# Patient Record
Sex: MALE | Race: BLACK OR AFRICAN AMERICAN | Employment: UNEMPLOYED | URBAN - METROPOLITAN AREA
[De-identification: names, ages, dates, MRNs, and addresses within clinical notes are randomized per-mention and may not be internally consistent; named-entity substitution may affect disease eponyms.]

---

## 2021-01-01 ENCOUNTER — APPOINTMENT (OUTPATIENT)
Dept: GENERAL RADIOLOGY | Age: 66
DRG: 870 | End: 2021-01-01
Attending: FAMILY MEDICINE
Payer: MEDICARE

## 2021-01-01 ENCOUNTER — APPOINTMENT (OUTPATIENT)
Dept: GENERAL RADIOLOGY | Age: 66
DRG: 870 | End: 2021-01-01
Attending: INTERNAL MEDICINE
Payer: MEDICARE

## 2021-01-01 ENCOUNTER — APPOINTMENT (OUTPATIENT)
Dept: NON INVASIVE DIAGNOSTICS | Age: 66
DRG: 870 | End: 2021-01-01
Attending: FAMILY MEDICINE
Payer: MEDICARE

## 2021-01-01 ENCOUNTER — APPOINTMENT (OUTPATIENT)
Dept: CT IMAGING | Age: 66
DRG: 870 | End: 2021-01-01
Attending: STUDENT IN AN ORGANIZED HEALTH CARE EDUCATION/TRAINING PROGRAM
Payer: MEDICARE

## 2021-01-01 ENCOUNTER — HOSPICE ADMISSION (OUTPATIENT)
Dept: HOSPICE | Facility: HOSPICE | Age: 66
End: 2021-01-01
Payer: MEDICARE

## 2021-01-01 ENCOUNTER — HOSPITAL ENCOUNTER (INPATIENT)
Age: 66
LOS: 4 days | End: 2021-08-17
Attending: FAMILY MEDICINE | Admitting: FAMILY MEDICINE
Payer: MEDICARE

## 2021-01-01 ENCOUNTER — HOSPITAL ENCOUNTER (INPATIENT)
Age: 66
LOS: 9 days | Discharge: HOSPICE | DRG: 870 | End: 2021-08-13
Attending: STUDENT IN AN ORGANIZED HEALTH CARE EDUCATION/TRAINING PROGRAM | Admitting: FAMILY MEDICINE
Payer: MEDICARE

## 2021-01-01 ENCOUNTER — APPOINTMENT (OUTPATIENT)
Dept: MRI IMAGING | Age: 66
DRG: 870 | End: 2021-01-01
Attending: FAMILY MEDICINE
Payer: MEDICARE

## 2021-01-01 VITALS
HEIGHT: 65 IN | BODY MASS INDEX: 29.2 KG/M2 | DIASTOLIC BLOOD PRESSURE: 69 MMHG | HEART RATE: 120 BPM | RESPIRATION RATE: 24 BRPM | SYSTOLIC BLOOD PRESSURE: 103 MMHG | TEMPERATURE: 99.9 F | WEIGHT: 175.27 LBS | OXYGEN SATURATION: 90 %

## 2021-01-01 VITALS
TEMPERATURE: 99.1 F | DIASTOLIC BLOOD PRESSURE: 36 MMHG | SYSTOLIC BLOOD PRESSURE: 60 MMHG | OXYGEN SATURATION: 70 % | HEART RATE: 85 BPM | RESPIRATION RATE: 4 BRPM

## 2021-01-01 DIAGNOSIS — R06.02 SHORTNESS OF BREATH: ICD-10-CM

## 2021-01-01 DIAGNOSIS — R41.82 ALTERED MENTAL STATUS, UNSPECIFIED ALTERED MENTAL STATUS TYPE: Primary | ICD-10-CM

## 2021-01-01 DIAGNOSIS — J39.8 INCREASED TRACHEAL SECRETIONS: ICD-10-CM

## 2021-01-01 DIAGNOSIS — R56.9 SEIZURES (HCC): ICD-10-CM

## 2021-01-01 DIAGNOSIS — Z86.73 HISTORY OF CVA (CEREBROVASCULAR ACCIDENT): ICD-10-CM

## 2021-01-01 DIAGNOSIS — Z51.5 HOSPICE CARE: ICD-10-CM

## 2021-01-01 DIAGNOSIS — R47.1 DYSARTHRIA: ICD-10-CM

## 2021-01-01 LAB
ALBUMIN SERPL-MCNC: 1.9 G/DL (ref 3.5–5)
ALBUMIN SERPL-MCNC: 2 G/DL (ref 3.5–5)
ALBUMIN SERPL-MCNC: 2 G/DL (ref 3.5–5)
ALBUMIN SERPL-MCNC: 2.2 G/DL (ref 3.5–5)
ALBUMIN SERPL-MCNC: 2.6 G/DL (ref 3.5–5)
ALBUMIN SERPL-MCNC: 2.6 G/DL (ref 3.5–5)
ALBUMIN SERPL-MCNC: 2.7 G/DL (ref 3.5–5)
ALBUMIN SERPL-MCNC: 2.7 G/DL (ref 3.5–5)
ALBUMIN SERPL-MCNC: 3.4 G/DL (ref 3.5–5)
ALBUMIN/GLOB SERPL: 0.5 {RATIO} (ref 1.1–2.2)
ALBUMIN/GLOB SERPL: 0.6 {RATIO} (ref 1.1–2.2)
ALBUMIN/GLOB SERPL: 0.7 {RATIO} (ref 1.1–2.2)
ALBUMIN/GLOB SERPL: 0.7 {RATIO} (ref 1.1–2.2)
ALBUMIN/GLOB SERPL: 0.8 {RATIO} (ref 1.1–2.2)
ALP SERPL-CCNC: 102 U/L (ref 45–117)
ALP SERPL-CCNC: 105 U/L (ref 45–117)
ALP SERPL-CCNC: 41 U/L (ref 45–117)
ALP SERPL-CCNC: 42 U/L (ref 45–117)
ALP SERPL-CCNC: 48 U/L (ref 45–117)
ALP SERPL-CCNC: 70 U/L (ref 45–117)
ALP SERPL-CCNC: 94 U/L (ref 45–117)
ALT SERPL-CCNC: 33 U/L (ref 12–78)
ALT SERPL-CCNC: 338 U/L (ref 12–78)
ALT SERPL-CCNC: 377 U/L (ref 12–78)
ALT SERPL-CCNC: 433 U/L (ref 12–78)
ALT SERPL-CCNC: 61 U/L (ref 12–78)
ALT SERPL-CCNC: 79 U/L (ref 12–78)
ALT SERPL-CCNC: <6 U/L (ref 12–78)
AMMONIA PLAS-SCNC: 37 UMOL/L
AMPHET UR QL SCN: NEGATIVE
AMPHET UR QL SCN: NEGATIVE
ANION GAP SERPL CALC-SCNC: 10 MMOL/L (ref 5–15)
ANION GAP SERPL CALC-SCNC: 3 MMOL/L (ref 5–15)
ANION GAP SERPL CALC-SCNC: 4 MMOL/L (ref 5–15)
ANION GAP SERPL CALC-SCNC: 5 MMOL/L (ref 5–15)
ANION GAP SERPL CALC-SCNC: 6 MMOL/L (ref 5–15)
ANION GAP SERPL CALC-SCNC: 7 MMOL/L (ref 5–15)
APPEARANCE UR: CLEAR
APPEARANCE UR: CLEAR
APTT PPP: 24.7 SEC (ref 21.2–34.1)
ARTERIAL PATENCY WRIST A: ABNORMAL
AST SERPL W P-5'-P-CCNC: 107 U/L (ref 15–37)
AST SERPL W P-5'-P-CCNC: 131 U/L (ref 15–37)
AST SERPL W P-5'-P-CCNC: 147 U/L (ref 15–37)
AST SERPL W P-5'-P-CCNC: 236 U/L (ref 15–37)
AST SERPL W P-5'-P-CCNC: 33 U/L (ref 15–37)
AST SERPL W P-5'-P-CCNC: 350 U/L (ref 15–37)
AST SERPL W P-5'-P-CCNC: 84 U/L (ref 15–37)
ATRIAL RATE: 135 BPM
ATRIAL RATE: 81 BPM
BACTERIA SPEC CULT: NORMAL
BACTERIA URNS QL MICRO: NEGATIVE /HPF
BACTERIA URNS QL MICRO: NEGATIVE /HPF
BARBITURATES UR QL SCN: NEGATIVE
BARBITURATES UR QL SCN: NEGATIVE
BASE DEFICIT BLDA-SCNC: 0.2 MMOL/L (ref 0–2)
BASE EXCESS BLDA CALC-SCNC: 0.5 MMOL/L (ref 0–2)
BASE EXCESS BLDA CALC-SCNC: 1.7 MMOL/L (ref 0–2)
BASE EXCESS BLDA CALC-SCNC: 1.8 MMOL/L (ref 0–2)
BASE EXCESS BLDA CALC-SCNC: 1.9 MMOL/L (ref 0–2)
BASE EXCESS BLDA CALC-SCNC: 3.6 MMOL/L (ref 0–2)
BASE EXCESS BLDA CALC-SCNC: 4.2 MMOL/L (ref 0–2)
BASOPHILS # BLD: 0 K/UL (ref 0–0.1)
BASOPHILS # BLD: 0.1 K/UL (ref 0–0.1)
BASOPHILS # BLD: 0.1 K/UL (ref 0–0.1)
BASOPHILS NFR BLD: 0 % (ref 0–1)
BDY SITE: ABNORMAL
BENZODIAZ UR QL: NEGATIVE
BENZODIAZ UR QL: NEGATIVE
BILIRUB DIRECT SERPL-MCNC: 0.2 MG/DL (ref 0–0.2)
BILIRUB SERPL-MCNC: 0.5 MG/DL (ref 0.2–1)
BILIRUB SERPL-MCNC: 0.5 MG/DL (ref 0.2–1)
BILIRUB SERPL-MCNC: 0.6 MG/DL (ref 0.2–1)
BILIRUB SERPL-MCNC: 0.6 MG/DL (ref 0.2–1)
BILIRUB SERPL-MCNC: 0.7 MG/DL (ref 0.2–1)
BILIRUB SERPL-MCNC: 1 MG/DL (ref 0.2–1)
BILIRUB SERPL-MCNC: 1.6 MG/DL (ref 0.2–1)
BILIRUB UR QL: NEGATIVE
BILIRUB UR QL: NEGATIVE
BNP SERPL-MCNC: 693 PG/ML
BUN SERPL-MCNC: 19 MG/DL (ref 6–20)
BUN SERPL-MCNC: 20 MG/DL (ref 6–20)
BUN SERPL-MCNC: 20 MG/DL (ref 6–20)
BUN SERPL-MCNC: 21 MG/DL (ref 6–20)
BUN SERPL-MCNC: 22 MG/DL (ref 6–20)
BUN SERPL-MCNC: 22 MG/DL (ref 6–20)
BUN SERPL-MCNC: 23 MG/DL (ref 6–20)
BUN SERPL-MCNC: 24 MG/DL (ref 6–20)
BUN SERPL-MCNC: 24 MG/DL (ref 6–20)
BUN SERPL-MCNC: 29 MG/DL (ref 6–20)
BUN SERPL-MCNC: 29 MG/DL (ref 6–20)
BUN/CREAT SERPL: 17 (ref 12–20)
BUN/CREAT SERPL: 17 (ref 12–20)
BUN/CREAT SERPL: 25 (ref 12–20)
BUN/CREAT SERPL: 26 (ref 12–20)
BUN/CREAT SERPL: 29 (ref 12–20)
BUN/CREAT SERPL: 30 (ref 12–20)
BUN/CREAT SERPL: 30 (ref 12–20)
BUN/CREAT SERPL: 31 (ref 12–20)
BUN/CREAT SERPL: 32 (ref 12–20)
BUN/CREAT SERPL: 36 (ref 12–20)
BUN/CREAT SERPL: 37 (ref 12–20)
CA-I BLD-MCNC: 6.1 MG/DL (ref 8.5–10.1)
CA-I BLD-MCNC: 7.4 MG/DL (ref 8.5–10.1)
CA-I BLD-MCNC: 7.8 MG/DL (ref 8.5–10.1)
CA-I BLD-MCNC: 8 MG/DL (ref 8.5–10.1)
CA-I BLD-MCNC: 8.2 MG/DL (ref 8.5–10.1)
CA-I BLD-MCNC: 8.3 MG/DL (ref 8.5–10.1)
CA-I BLD-MCNC: 8.3 MG/DL (ref 8.5–10.1)
CA-I BLD-MCNC: 8.4 MG/DL (ref 8.5–10.1)
CA-I BLD-MCNC: 8.6 MG/DL (ref 8.5–10.1)
CALCULATED P AXIS, ECG09: 49 DEGREES
CALCULATED R AXIS, ECG10: -9 DEGREES
CALCULATED R AXIS, ECG10: 12 DEGREES
CALCULATED T AXIS, ECG11: 28 DEGREES
CALCULATED T AXIS, ECG11: 39 DEGREES
CANNABINOIDS UR QL SCN: NEGATIVE
CANNABINOIDS UR QL SCN: NEGATIVE
CHLORIDE SERPL-SCNC: 104 MMOL/L (ref 97–108)
CHLORIDE SERPL-SCNC: 107 MMOL/L (ref 97–108)
CHLORIDE SERPL-SCNC: 108 MMOL/L (ref 97–108)
CHLORIDE SERPL-SCNC: 109 MMOL/L (ref 97–108)
CHLORIDE SERPL-SCNC: 110 MMOL/L (ref 97–108)
CHLORIDE SERPL-SCNC: 110 MMOL/L (ref 97–108)
CHLORIDE SERPL-SCNC: 111 MMOL/L (ref 97–108)
CHLORIDE SERPL-SCNC: 112 MMOL/L (ref 97–108)
CHLORIDE SERPL-SCNC: 113 MMOL/L (ref 97–108)
CHLORIDE SERPL-SCNC: 113 MMOL/L (ref 97–108)
CO2 SERPL-SCNC: 19 MMOL/L (ref 21–32)
CO2 SERPL-SCNC: 23 MMOL/L (ref 21–32)
CO2 SERPL-SCNC: 24 MMOL/L (ref 21–32)
CO2 SERPL-SCNC: 25 MMOL/L (ref 21–32)
CO2 SERPL-SCNC: 26 MMOL/L (ref 21–32)
CO2 SERPL-SCNC: 26 MMOL/L (ref 21–32)
CO2 SERPL-SCNC: 27 MMOL/L (ref 21–32)
CO2 SERPL-SCNC: 27 MMOL/L (ref 21–32)
CO2 SERPL-SCNC: 29 MMOL/L (ref 21–32)
CO2 SERPL-SCNC: 29 MMOL/L (ref 21–32)
CO2 SERPL-SCNC: 30 MMOL/L (ref 21–32)
COCAINE UR QL SCN: NEGATIVE
COCAINE UR QL SCN: NEGATIVE
COLOR UR: ABNORMAL
COLOR UR: ABNORMAL
CREAT SERPL-MCNC: 0.54 MG/DL (ref 0.7–1.3)
CREAT SERPL-MCNC: 0.59 MG/DL (ref 0.7–1.3)
CREAT SERPL-MCNC: 0.59 MG/DL (ref 0.7–1.3)
CREAT SERPL-MCNC: 0.66 MG/DL (ref 0.7–1.3)
CREAT SERPL-MCNC: 0.68 MG/DL (ref 0.7–1.3)
CREAT SERPL-MCNC: 0.69 MG/DL (ref 0.7–1.3)
CREAT SERPL-MCNC: 0.77 MG/DL (ref 0.7–1.3)
CREAT SERPL-MCNC: 0.78 MG/DL (ref 0.7–1.3)
CREAT SERPL-MCNC: 0.8 MG/DL (ref 0.7–1.3)
CREAT SERPL-MCNC: 1.13 MG/DL (ref 0.7–1.3)
CREAT SERPL-MCNC: 1.15 MG/DL (ref 0.7–1.3)
CREAT SERPL-MCNC: 1.29 MG/DL (ref 0.7–1.3)
CREAT SERPL-MCNC: 1.32 MG/DL (ref 0.7–1.3)
DIAGNOSIS, 93000: NORMAL
DIAGNOSIS, 93000: NORMAL
DIFFERENTIAL METHOD BLD: ABNORMAL
DRUG SCRN COMMENT,DRGCM: NORMAL
DRUG SCRN COMMENT,DRGCM: NORMAL
ECHO AO ASC DIAM: 2.8 CM
ECHO AO ROOT DIAM: 2.7 CM
ECHO AV AREA PEAK VELOCITY: 2.89 CM2
ECHO AV AREA VTI: 3.76 CM2
ECHO AV AREA/BSA PEAK VELOCITY: 1.7 CM2/M2
ECHO AV AREA/BSA VTI: 2.2 CM2/M2
ECHO AV MEAN GRADIENT: 3 MMHG
ECHO AV MEAN VELOCITY: 80.4 CM/S
ECHO AV PEAK GRADIENT: 5 MMHG
ECHO AV PEAK VELOCITY: 112 CM/S
ECHO AV VTI: 12.1 CM
ECHO EST RA PRESSURE: 5 MMHG
ECHO LV E' SEPTAL VELOCITY: 5.56 CM/S
ECHO LV EDV A2C: 71 CM3
ECHO LV EDV A4C: 44 CM3
ECHO LV ESV A2C: 24.9 CM3
ECHO LV ESV A4C: 27 CM3
ECHO LV INTERNAL DIMENSION DIASTOLIC: 4.14 CM (ref 4.2–5.9)
ECHO LV INTERNAL DIMENSION SYSTOLIC: 2.92 CM
ECHO LV IVSD: 0.83 CM (ref 0.6–1)
ECHO LV MASS 2D: 110.7 G (ref 88–224)
ECHO LV MASS INDEX 2D: 64 G/M2 (ref 49–115)
ECHO LV POSTERIOR WALL DIASTOLIC: 0.91 CM (ref 0.6–1)
ECHO LVOT DIAM: 1.9 CM
ECHO LVOT PEAK GRADIENT: 5 MMHG
ECHO LVOT PEAK VELOCITY: 114 CM/S
ECHO LVOT SV: 45 CM3
ECHO LVOT VTI: 16 CM
ECHO LVOT VTI: 16 CM
ECHO MV A VELOCITY: 125 CM/S
ECHO MV AREA PHT: 9.17 CM2
ECHO MV E VELOCITY: 77.7 CM/S
ECHO MV E/A RATIO: 0.62
ECHO MV E/E' SEPTAL: 13.97
ECHO MV MAX VELOCITY: 145 CM/S
ECHO MV MEAN GRADIENT: 4 MMHG
ECHO MV PEAK GRADIENT: 8 MMHG
ECHO MV PRESSURE HALF TIME (PHT): 24 MS
ECHO MV VTI: 22.4 CM
ECHO RIGHT VENTRICULAR SYSTOLIC PRESSURE (RVSP): 9 MMHG
ECHO TV MAX VELOCITY: 99.9 CM/S
ECHO TV REGURGITANT PEAK GRADIENT: 4 MMHG
EOSINOPHIL # BLD: 0 K/UL (ref 0–0.4)
EOSINOPHIL # BLD: 0.1 K/UL (ref 0–0.4)
EOSINOPHIL NFR BLD: 0 % (ref 0–7)
EOSINOPHIL NFR BLD: 1 % (ref 0–7)
EPAP/CPAP/PEEP, PAPEEP: 0
EPAP/CPAP/PEEP, PAPEEP: 5
ERYTHROCYTE [DISTWIDTH] IN BLOOD BY AUTOMATED COUNT: 12.9 % (ref 11.5–14.5)
ERYTHROCYTE [DISTWIDTH] IN BLOOD BY AUTOMATED COUNT: 13 % (ref 11.5–14.5)
ERYTHROCYTE [DISTWIDTH] IN BLOOD BY AUTOMATED COUNT: 13.1 % (ref 11.5–14.5)
ERYTHROCYTE [DISTWIDTH] IN BLOOD BY AUTOMATED COUNT: 13.2 % (ref 11.5–14.5)
ERYTHROCYTE [DISTWIDTH] IN BLOOD BY AUTOMATED COUNT: 13.2 % (ref 11.5–14.5)
FIO2 ON VENT: 100 %
FIO2 ON VENT: 35 %
FIO2 ON VENT: 40 %
FIO2 ON VENT: 50 %
FIO2 ON VENT: 80 %
GAS FLOW.O2 SETTING OXYMISER: 10 L/MIN
GAS FLOW.O2 SETTING OXYMISER: 12 L/MIN
GAS FLOW.O2 SETTING OXYMISER: 12 L/MIN
GAS FLOW.O2 SETTING OXYMISER: 5 L/MIN
GLOBULIN SER CALC-MCNC: 3.7 G/DL (ref 2–4)
GLOBULIN SER CALC-MCNC: 3.8 G/DL (ref 2–4)
GLOBULIN SER CALC-MCNC: 3.9 G/DL (ref 2–4)
GLOBULIN SER CALC-MCNC: 4.2 G/DL (ref 2–4)
GLOBULIN SER CALC-MCNC: 4.5 G/DL (ref 2–4)
GLUCOSE BLD STRIP.AUTO-MCNC: 102 MG/DL (ref 65–117)
GLUCOSE BLD STRIP.AUTO-MCNC: 104 MG/DL (ref 65–117)
GLUCOSE BLD STRIP.AUTO-MCNC: 109 MG/DL (ref 65–117)
GLUCOSE BLD STRIP.AUTO-MCNC: 110 MG/DL (ref 65–117)
GLUCOSE BLD STRIP.AUTO-MCNC: 117 MG/DL (ref 65–117)
GLUCOSE BLD STRIP.AUTO-MCNC: 117 MG/DL (ref 65–117)
GLUCOSE BLD STRIP.AUTO-MCNC: 122 MG/DL (ref 65–117)
GLUCOSE BLD STRIP.AUTO-MCNC: 130 MG/DL (ref 65–117)
GLUCOSE BLD STRIP.AUTO-MCNC: 135 MG/DL (ref 65–117)
GLUCOSE BLD STRIP.AUTO-MCNC: 147 MG/DL (ref 65–117)
GLUCOSE BLD STRIP.AUTO-MCNC: 152 MG/DL (ref 65–117)
GLUCOSE BLD STRIP.AUTO-MCNC: 70 MG/DL (ref 65–117)
GLUCOSE BLD STRIP.AUTO-MCNC: 91 MG/DL (ref 65–117)
GLUCOSE BLD STRIP.AUTO-MCNC: 96 MG/DL (ref 65–117)
GLUCOSE SERPL-MCNC: 102 MG/DL (ref 65–100)
GLUCOSE SERPL-MCNC: 103 MG/DL (ref 65–100)
GLUCOSE SERPL-MCNC: 108 MG/DL (ref 65–100)
GLUCOSE SERPL-MCNC: 108 MG/DL (ref 65–100)
GLUCOSE SERPL-MCNC: 115 MG/DL (ref 65–100)
GLUCOSE SERPL-MCNC: 119 MG/DL (ref 65–100)
GLUCOSE SERPL-MCNC: 120 MG/DL (ref 65–100)
GLUCOSE SERPL-MCNC: 123 MG/DL (ref 65–100)
GLUCOSE SERPL-MCNC: 123 MG/DL (ref 65–100)
GLUCOSE SERPL-MCNC: 125 MG/DL (ref 65–100)
GLUCOSE SERPL-MCNC: 129 MG/DL (ref 65–100)
GLUCOSE SERPL-MCNC: 140 MG/DL (ref 65–100)
GLUCOSE SERPL-MCNC: 99 MG/DL (ref 65–100)
GLUCOSE UR STRIP.AUTO-MCNC: 50 MG/DL
GLUCOSE UR STRIP.AUTO-MCNC: NEGATIVE MG/DL
GRAM STN SPEC: NORMAL
HCO3 BLDA-SCNC: 24 MMOL/L (ref 22–26)
HCO3 BLDA-SCNC: 24 MMOL/L (ref 22–26)
HCO3 BLDA-SCNC: 25 MMOL/L (ref 22–26)
HCO3 BLDA-SCNC: 25 MMOL/L (ref 22–26)
HCO3 BLDA-SCNC: 26 MMOL/L (ref 22–26)
HCO3 BLDA-SCNC: 28 MMOL/L (ref 22–26)
HCO3 BLDA-SCNC: 28 MMOL/L (ref 22–26)
HCT VFR BLD AUTO: 34.2 % (ref 36.6–50.3)
HCT VFR BLD AUTO: 34.6 % (ref 36.6–50.3)
HCT VFR BLD AUTO: 35.2 % (ref 36.6–50.3)
HCT VFR BLD AUTO: 36.7 % (ref 36.6–50.3)
HCT VFR BLD AUTO: 38.8 % (ref 36.6–50.3)
HCT VFR BLD AUTO: 39 % (ref 36.6–50.3)
HCT VFR BLD AUTO: 43.1 % (ref 36.6–50.3)
HCT VFR BLD AUTO: 43.4 % (ref 36.6–50.3)
HGB BLD-MCNC: 11.4 G/DL (ref 12.1–17)
HGB BLD-MCNC: 11.6 G/DL (ref 12.1–17)
HGB BLD-MCNC: 11.8 G/DL (ref 12.1–17)
HGB BLD-MCNC: 12.1 G/DL (ref 12.1–17)
HGB BLD-MCNC: 13 G/DL (ref 12.1–17)
HGB BLD-MCNC: 13 G/DL (ref 12.1–17)
HGB BLD-MCNC: 13.9 G/DL (ref 12.1–17)
HGB BLD-MCNC: 14.5 G/DL (ref 12.1–17)
HGB UR QL STRIP: NEGATIVE
HGB UR QL STRIP: NEGATIVE
IMM GRANULOCYTES # BLD AUTO: 0 K/UL
IMM GRANULOCYTES # BLD AUTO: 0.1 K/UL (ref 0–0.04)
IMM GRANULOCYTES # BLD AUTO: 0.1 K/UL (ref 0–0.04)
IMM GRANULOCYTES # BLD AUTO: 0.2 K/UL (ref 0–0.04)
IMM GRANULOCYTES # BLD AUTO: 0.4 K/UL (ref 0–0.04)
IMM GRANULOCYTES NFR BLD AUTO: 0 %
IMM GRANULOCYTES NFR BLD AUTO: 0 % (ref 0–0.5)
IMM GRANULOCYTES NFR BLD AUTO: 1 % (ref 0–0.5)
IMM GRANULOCYTES NFR BLD AUTO: 2 % (ref 0–0.5)
INR PPP: 1.1 (ref 0.9–1.1)
IPAP/PIP, IPAPIP: 0
KETONES UR QL STRIP.AUTO: 5 MG/DL
KETONES UR QL STRIP.AUTO: 5 MG/DL
LACTATE SERPL-SCNC: 5.5 MMOL/L (ref 0.4–2)
LEFT CCA DIST DIAS: 19.3 CM/S
LEFT CCA DIST SYS: 114 CM/S
LEFT CCA PROX DIAS: 27.9 CM/S
LEFT CCA PROX SYS: 156 CM/S
LEFT ECA DIAS: 7.84 CM/S
LEFT ECA SYS: 82.6 CM/S
LEFT ICA PROX DIAS: 23.4 CM/S
LEFT ICA PROX SYS: 96.9 CM/S
LEFT SUBCLAVIAN DIAS: 26.9 CM/S
LEFT SUBCLAVIAN SYS: 145 CM/S
LEFT VERTEBRAL DIAS: 6.45 CM/S
LEFT VERTEBRAL SYS: 53.7 CM/S
LEUKOCYTE ESTERASE UR QL STRIP.AUTO: NEGATIVE
LEUKOCYTE ESTERASE UR QL STRIP.AUTO: NEGATIVE
LEVETIRACETAM SERPL-MCNC: 6.1 UG/ML (ref 10–40)
LVOT MG: 3 MMHG
LYMPHOCYTES # BLD: 0.2 K/UL (ref 0.8–3.5)
LYMPHOCYTES # BLD: 0.3 K/UL (ref 0.8–3.5)
LYMPHOCYTES # BLD: 0.5 K/UL (ref 0.8–3.5)
LYMPHOCYTES # BLD: 0.7 K/UL (ref 0.8–3.5)
LYMPHOCYTES # BLD: 0.8 K/UL (ref 0.8–3.5)
LYMPHOCYTES # BLD: 0.8 K/UL (ref 0.8–3.5)
LYMPHOCYTES NFR BLD: 1 % (ref 12–49)
LYMPHOCYTES NFR BLD: 2 % (ref 12–49)
LYMPHOCYTES NFR BLD: 3 % (ref 12–49)
LYMPHOCYTES NFR BLD: 3 % (ref 12–49)
LYMPHOCYTES NFR BLD: 5 % (ref 12–49)
LYMPHOCYTES NFR BLD: 6 % (ref 12–49)
MAGNESIUM SERPL-MCNC: 1.9 MG/DL (ref 1.6–2.4)
MAGNESIUM SERPL-MCNC: 2 MG/DL (ref 1.6–2.4)
MAGNESIUM SERPL-MCNC: 2.4 MG/DL (ref 1.6–2.4)
MCH RBC QN AUTO: 29.4 PG (ref 26–34)
MCH RBC QN AUTO: 29.6 PG (ref 26–34)
MCH RBC QN AUTO: 29.8 PG (ref 26–34)
MCH RBC QN AUTO: 30 PG (ref 26–34)
MCH RBC QN AUTO: 30 PG (ref 26–34)
MCH RBC QN AUTO: 30.2 PG (ref 26–34)
MCH RBC QN AUTO: 30.3 PG (ref 26–34)
MCH RBC QN AUTO: 30.3 PG (ref 26–34)
MCHC RBC AUTO-ENTMCNC: 32.3 G/DL (ref 30–36.5)
MCHC RBC AUTO-ENTMCNC: 33 G/DL (ref 30–36.5)
MCHC RBC AUTO-ENTMCNC: 33 G/DL (ref 30–36.5)
MCHC RBC AUTO-ENTMCNC: 33.3 G/DL (ref 30–36.5)
MCHC RBC AUTO-ENTMCNC: 33.3 G/DL (ref 30–36.5)
MCHC RBC AUTO-ENTMCNC: 33.4 G/DL (ref 30–36.5)
MCHC RBC AUTO-ENTMCNC: 33.5 G/DL (ref 30–36.5)
MCHC RBC AUTO-ENTMCNC: 34.1 G/DL (ref 30–36.5)
MCV RBC AUTO: 88.8 FL (ref 80–99)
MCV RBC AUTO: 88.9 FL (ref 80–99)
MCV RBC AUTO: 89.1 FL (ref 80–99)
MCV RBC AUTO: 89.4 FL (ref 80–99)
MCV RBC AUTO: 90.5 FL (ref 80–99)
MCV RBC AUTO: 90.5 FL (ref 80–99)
MCV RBC AUTO: 90.6 FL (ref 80–99)
MCV RBC AUTO: 93.1 FL (ref 80–99)
METHADONE UR QL: NEGATIVE
METHADONE UR QL: NEGATIVE
MONOCYTES # BLD: 0.4 K/UL (ref 0–1)
MONOCYTES # BLD: 0.4 K/UL (ref 0–1)
MONOCYTES # BLD: 0.5 K/UL (ref 0–1)
MONOCYTES # BLD: 0.5 K/UL (ref 0–1)
MONOCYTES # BLD: 0.6 K/UL (ref 0–1)
MONOCYTES # BLD: 0.6 K/UL (ref 0–1)
MONOCYTES # BLD: 0.8 K/UL (ref 0–1)
MONOCYTES # BLD: 0.8 K/UL (ref 0–1)
MONOCYTES NFR BLD: 2 % (ref 5–13)
MONOCYTES NFR BLD: 3 % (ref 5–13)
MONOCYTES NFR BLD: 5 % (ref 5–13)
MONOCYTES NFR BLD: 6 % (ref 5–13)
MRSA DNA SPEC QL NAA+PROBE: NOT DETECTED
MUCOUS THREADS URNS QL MICRO: ABNORMAL /LPF
MV DEC SLOPE: 9500 MM/S2
MV DEC SLOPE: 9500 MM/S2
NEUTS BAND NFR BLD MANUAL: 23 % (ref 0–6)
NEUTS SEG # BLD: 10.9 K/UL (ref 1.8–8)
NEUTS SEG # BLD: 11.8 K/UL (ref 1.8–8)
NEUTS SEG # BLD: 16.8 K/UL (ref 1.8–8)
NEUTS SEG # BLD: 17.4 K/UL (ref 1.8–8)
NEUTS SEG # BLD: 17.4 K/UL (ref 1.8–8)
NEUTS SEG # BLD: 17.8 K/UL (ref 1.8–8)
NEUTS SEG # BLD: 20.8 K/UL (ref 1.8–8)
NEUTS SEG # BLD: 23.1 K/UL (ref 1.8–8)
NEUTS SEG NFR BLD: 66 % (ref 32–75)
NEUTS SEG NFR BLD: 88 % (ref 32–75)
NEUTS SEG NFR BLD: 93 % (ref 32–75)
NEUTS SEG NFR BLD: 94 % (ref 32–75)
NEUTS SEG NFR BLD: 94 % (ref 32–75)
NEUTS SEG NFR BLD: 95 % (ref 32–75)
NEUTS SEG NFR BLD: 96 % (ref 32–75)
NEUTS SEG NFR BLD: 96 % (ref 32–75)
NITRITE UR QL STRIP.AUTO: NEGATIVE
NITRITE UR QL STRIP.AUTO: NEGATIVE
NRBC # BLD: 0 K/UL (ref 0–0.01)
NRBC # BLD: 0.02 K/UL (ref 0–0.01)
NRBC BLD-RTO: 0 PER 100 WBC
NRBC BLD-RTO: 0.1 PER 100 WBC
OPIATES UR QL: NEGATIVE
OPIATES UR QL: NEGATIVE
P-R INTERVAL, ECG05: 144 MS
P-R INTERVAL, ECG05: 176 MS
PCO2 BLDA: 29 MMHG (ref 35–45)
PCO2 BLDA: 29 MMHG (ref 35–45)
PCO2 BLDA: 31 MMHG (ref 35–45)
PCO2 BLDA: 32 MMHG (ref 35–45)
PCO2 BLDA: 33 MMHG (ref 35–45)
PCO2 BLDA: 34 MMHG (ref 35–45)
PCO2 BLDA: 37 MMHG (ref 35–45)
PCP UR QL: NEGATIVE
PCP UR QL: NEGATIVE
PERFORMED BY, TECHID: ABNORMAL
PERFORMED BY, TECHID: NORMAL
PH BLDA: 7.45 [PH] (ref 7.35–7.45)
PH BLDA: 7.47 [PH] (ref 7.35–7.45)
PH BLDA: 7.47 [PH] (ref 7.35–7.45)
PH BLDA: 7.5 [PH] (ref 7.35–7.45)
PH BLDA: 7.51 [PH] (ref 7.35–7.45)
PH BLDA: 7.53 [PH] (ref 7.35–7.45)
PH BLDA: 7.55 [PH] (ref 7.35–7.45)
PH UR STRIP: 6 [PH] (ref 5–8)
PH UR STRIP: 7 [PH] (ref 5–8)
PHOSPHATE SERPL-MCNC: 1.4 MG/DL (ref 2.6–4.7)
PHOSPHATE SERPL-MCNC: 1.9 MG/DL (ref 2.6–4.7)
PHOSPHATE SERPL-MCNC: 1.9 MG/DL (ref 2.6–4.7)
PHOSPHATE SERPL-MCNC: 2.9 MG/DL (ref 2.6–4.7)
PHOSPHATE SERPL-MCNC: 2.9 MG/DL (ref 2.6–4.7)
PHOSPHATE SERPL-MCNC: 3 MG/DL (ref 2.6–4.7)
PHOSPHATE SERPL-MCNC: 3.6 MG/DL (ref 2.6–4.7)
PLATELET # BLD AUTO: 124 K/UL (ref 150–400)
PLATELET # BLD AUTO: 128 K/UL (ref 150–400)
PLATELET # BLD AUTO: 131 K/UL (ref 150–400)
PLATELET # BLD AUTO: 147 K/UL (ref 150–400)
PLATELET # BLD AUTO: 155 K/UL (ref 150–400)
PLATELET # BLD AUTO: 173 K/UL (ref 150–400)
PLATELET # BLD AUTO: 176 K/UL (ref 150–400)
PLATELET # BLD AUTO: 195 K/UL (ref 150–400)
PMV BLD AUTO: 11.1 FL (ref 8.9–12.9)
PMV BLD AUTO: 11.5 FL (ref 8.9–12.9)
PMV BLD AUTO: 11.6 FL (ref 8.9–12.9)
PMV BLD AUTO: 11.9 FL (ref 8.9–12.9)
PMV BLD AUTO: 12.1 FL (ref 8.9–12.9)
PMV BLD AUTO: 12.3 FL (ref 8.9–12.9)
PO2 BLDA: 107 MMHG (ref 75–100)
PO2 BLDA: 107 MMHG (ref 75–100)
PO2 BLDA: 134 MMHG (ref 75–100)
PO2 BLDA: 165 MMHG (ref 75–100)
PO2 BLDA: 54 MMHG (ref 75–100)
PO2 BLDA: 57 MMHG (ref 75–100)
PO2 BLDA: 79 MMHG (ref 75–100)
POTASSIUM SERPL-SCNC: 3.8 MMOL/L (ref 3.5–5.1)
POTASSIUM SERPL-SCNC: 3.9 MMOL/L (ref 3.5–5.1)
POTASSIUM SERPL-SCNC: 4.1 MMOL/L (ref 3.5–5.1)
POTASSIUM SERPL-SCNC: 4.2 MMOL/L (ref 3.5–5.1)
POTASSIUM SERPL-SCNC: 4.2 MMOL/L (ref 3.5–5.1)
PRESSURE SUPPORT SETTING VENT: 15 CM[H2O]
PROLACTIN SERPL-MCNC: 19 NG/ML
PROT SERPL-MCNC: 5.8 G/DL (ref 6.4–8.2)
PROT SERPL-MCNC: 5.9 G/DL (ref 6.4–8.2)
PROT SERPL-MCNC: 6 G/DL (ref 6.4–8.2)
PROT SERPL-MCNC: 6.3 G/DL (ref 6.4–8.2)
PROT SERPL-MCNC: 6.4 G/DL (ref 6.4–8.2)
PROT SERPL-MCNC: 6.6 G/DL (ref 6.4–8.2)
PROT SERPL-MCNC: 7.9 G/DL (ref 6.4–8.2)
PROT UR STRIP-MCNC: 30 MG/DL
PROT UR STRIP-MCNC: 30 MG/DL
PROTHROMBIN TIME: 14 SEC (ref 11.9–14.7)
Q-T INTERVAL, ECG07: 298 MS
Q-T INTERVAL, ECG07: 376 MS
QRS DURATION, ECG06: 86 MS
QRS DURATION, ECG06: 96 MS
QTC CALCULATION (BEZET), ECG08: 436 MS
QTC CALCULATION (BEZET), ECG08: 447 MS
RBC # BLD AUTO: 3.85 M/UL (ref 4.1–5.7)
RBC # BLD AUTO: 3.89 M/UL (ref 4.1–5.7)
RBC # BLD AUTO: 3.89 M/UL (ref 4.1–5.7)
RBC # BLD AUTO: 4.12 M/UL (ref 4.1–5.7)
RBC # BLD AUTO: 4.31 M/UL (ref 4.1–5.7)
RBC # BLD AUTO: 4.34 M/UL (ref 4.1–5.7)
RBC # BLD AUTO: 4.63 M/UL (ref 4.1–5.7)
RBC # BLD AUTO: 4.79 M/UL (ref 4.1–5.7)
RBC #/AREA URNS HPF: ABNORMAL /HPF (ref 0–5)
RBC #/AREA URNS HPF: ABNORMAL /HPF (ref 0–5)
RBC MORPH BLD: ABNORMAL
RIGHT CCA DIST DIAS: 23.7 CM/S
RIGHT CCA DIST SYS: 121 CM/S
RIGHT CCA PROX DIAS: 19.1 CM/S
RIGHT CCA PROX SYS: 117 CM/S
RIGHT ECA DIAS: 14.9 CM/S
RIGHT ECA SYS: 94 CM/S
RIGHT ICA DIST DIAS: 16.1 CM/S
RIGHT ICA DIST SYS: 68.5 CM/S
RIGHT ICA PROX DIAS: 8.2 CM/S
RIGHT ICA PROX SYS: 52.9 CM/S
RIGHT SUBCLAVIAN DIAS: 0 CM/S
RIGHT SUBCLAVIAN SYS: 98.5 CM/S
RIGHT VERTEBRAL DIAS: 9.55 CM/S
RIGHT VERTEBRAL SYS: 44.9 CM/S
SAO2 % BLD: 89 %
SAO2 % BLD: 92 %
SAO2 % BLD: 97 %
SAO2 % BLD: 98 %
SAO2 % BLD: 98 %
SAO2 % BLD: >100 %
SAO2 % BLD: >100 %
SAO2% DEVICE SAO2% SENSOR NAME: ABNORMAL
SERVICE CMNT-IMP: ABNORMAL
SODIUM SERPL-SCNC: 136 MMOL/L (ref 136–145)
SODIUM SERPL-SCNC: 137 MMOL/L (ref 136–145)
SODIUM SERPL-SCNC: 137 MMOL/L (ref 136–145)
SODIUM SERPL-SCNC: 139 MMOL/L (ref 136–145)
SODIUM SERPL-SCNC: 140 MMOL/L (ref 136–145)
SODIUM SERPL-SCNC: 141 MMOL/L (ref 136–145)
SODIUM SERPL-SCNC: 142 MMOL/L (ref 136–145)
SODIUM SERPL-SCNC: 143 MMOL/L (ref 136–145)
SODIUM SERPL-SCNC: 143 MMOL/L (ref 136–145)
SODIUM SERPL-SCNC: 144 MMOL/L (ref 136–145)
SODIUM SERPL-SCNC: 145 MMOL/L (ref 136–145)
SP GR UR REFRACTOMETRY: 1.02 (ref 1–1.03)
SP GR UR REFRACTOMETRY: 1.03 (ref 1–1.03)
SPECIAL REQUESTS,SREQ: NORMAL
SPECIMEN SITE: ABNORMAL
THERAPEUTIC RANGE,PTTT: NORMAL SEC
TROPONIN I SERPL-MCNC: 0.38 NG/ML
TROPONIN I SERPL-MCNC: 1.38 NG/ML
TROPONIN I SERPL-MCNC: 1.61 NG/ML
TROPONIN I SERPL-MCNC: <0.05 NG/ML
TSH SERPL DL<=0.05 MIU/L-ACNC: 0.9 UIU/ML (ref 0.36–3.74)
UROBILINOGEN UR QL STRIP.AUTO: 4 EU/DL (ref 0.1–1)
UROBILINOGEN UR QL STRIP.AUTO: 4 EU/DL (ref 0.1–1)
VENTILATION MODE VENT: ABNORMAL
VENTRICULAR RATE, ECG03: 135 BPM
VENTRICULAR RATE, ECG03: 81 BPM
VT SETTING VENT: 450 ML
VT SETTING VENT: 500 ML
WBC # BLD AUTO: 12.5 K/UL (ref 4.1–11.1)
WBC # BLD AUTO: 13.3 K/UL (ref 4.1–11.1)
WBC # BLD AUTO: 17.9 K/UL (ref 4.1–11.1)
WBC # BLD AUTO: 18.2 K/UL (ref 4.1–11.1)
WBC # BLD AUTO: 18.3 K/UL (ref 4.1–11.1)
WBC # BLD AUTO: 19 K/UL (ref 4.1–11.1)
WBC # BLD AUTO: 22.6 K/UL (ref 4.1–11.1)
WBC # BLD AUTO: 24.5 K/UL (ref 4.1–11.1)
WBC URNS QL MICRO: ABNORMAL /HPF (ref 0–4)
WBC URNS QL MICRO: ABNORMAL /HPF (ref 0–4)

## 2021-01-01 PROCEDURE — 3336500001 HSPC ELECTION

## 2021-01-01 PROCEDURE — 74011250636 HC RX REV CODE- 250/636: Performed by: FAMILY MEDICINE

## 2021-01-01 PROCEDURE — 77010033678 HC OXYGEN DAILY

## 2021-01-01 PROCEDURE — 0656 HSPC GENERAL INPATIENT

## 2021-01-01 PROCEDURE — 70450 CT HEAD/BRAIN W/O DYE: CPT

## 2021-01-01 PROCEDURE — 99233 SBSQ HOSP IP/OBS HIGH 50: CPT | Performed by: FAMILY MEDICINE

## 2021-01-01 PROCEDURE — 70551 MRI BRAIN STEM W/O DYE: CPT

## 2021-01-01 PROCEDURE — 71045 X-RAY EXAM CHEST 1 VIEW: CPT

## 2021-01-01 PROCEDURE — 74011250636 HC RX REV CODE- 250/636: Performed by: INTERNAL MEDICINE

## 2021-01-01 PROCEDURE — 74011250637 HC RX REV CODE- 250/637: Performed by: INTERNAL MEDICINE

## 2021-01-01 PROCEDURE — G0299 HHS/HOSPICE OF RN EA 15 MIN: HCPCS

## 2021-01-01 PROCEDURE — 74011250637 HC RX REV CODE- 250/637: Performed by: FAMILY MEDICINE

## 2021-01-01 PROCEDURE — 36415 COLL VENOUS BLD VENIPUNCTURE: CPT

## 2021-01-01 PROCEDURE — 84146 ASSAY OF PROLACTIN: CPT

## 2021-01-01 PROCEDURE — 82140 ASSAY OF AMMONIA: CPT

## 2021-01-01 PROCEDURE — 85025 COMPLETE CBC W/AUTO DIFF WBC: CPT

## 2021-01-01 PROCEDURE — 94003 VENT MGMT INPAT SUBQ DAY: CPT

## 2021-01-01 PROCEDURE — 74011000250 HC RX REV CODE- 250: Performed by: FAMILY MEDICINE

## 2021-01-01 PROCEDURE — 80053 COMPREHEN METABOLIC PANEL: CPT

## 2021-01-01 PROCEDURE — 84443 ASSAY THYROID STIM HORMONE: CPT

## 2021-01-01 PROCEDURE — 96374 THER/PROPH/DIAG INJ IV PUSH: CPT

## 2021-01-01 PROCEDURE — 65610000006 HC RM INTENSIVE CARE

## 2021-01-01 PROCEDURE — 82803 BLOOD GASES ANY COMBINATION: CPT

## 2021-01-01 PROCEDURE — 74011000258 HC RX REV CODE- 258: Performed by: FAMILY MEDICINE

## 2021-01-01 PROCEDURE — 80177 DRUG SCRN QUAN LEVETIRACETAM: CPT

## 2021-01-01 PROCEDURE — 74011250636 HC RX REV CODE- 250/636: Performed by: STUDENT IN AN ORGANIZED HEALTH CARE EDUCATION/TRAINING PROGRAM

## 2021-01-01 PROCEDURE — 74011250636 HC RX REV CODE- 250/636: Performed by: NURSE PRACTITIONER

## 2021-01-01 PROCEDURE — 82962 GLUCOSE BLOOD TEST: CPT

## 2021-01-01 PROCEDURE — 80069 RENAL FUNCTION PANEL: CPT

## 2021-01-01 PROCEDURE — 36600 WITHDRAWAL OF ARTERIAL BLOOD: CPT

## 2021-01-01 PROCEDURE — 95816 EEG AWAKE AND DROWSY: CPT | Performed by: PSYCHIATRY & NEUROLOGY

## 2021-01-01 PROCEDURE — 74011250636 HC RX REV CODE- 250/636

## 2021-01-01 PROCEDURE — 83735 ASSAY OF MAGNESIUM: CPT

## 2021-01-01 PROCEDURE — G0300 HHS/HOSPICE OF LPN EA 15 MIN: HCPCS

## 2021-01-01 PROCEDURE — 99223 1ST HOSP IP/OBS HIGH 75: CPT | Performed by: FAMILY MEDICINE

## 2021-01-01 PROCEDURE — 74011000250 HC RX REV CODE- 250

## 2021-01-01 PROCEDURE — 94760 N-INVAS EAR/PLS OXIMETRY 1: CPT

## 2021-01-01 PROCEDURE — 84484 ASSAY OF TROPONIN QUANT: CPT

## 2021-01-01 PROCEDURE — 80076 HEPATIC FUNCTION PANEL: CPT

## 2021-01-01 PROCEDURE — 80307 DRUG TEST PRSMV CHEM ANLYZR: CPT

## 2021-01-01 PROCEDURE — 94002 VENT MGMT INPAT INIT DAY: CPT

## 2021-01-01 PROCEDURE — 87070 CULTURE OTHR SPECIMN AEROBIC: CPT

## 2021-01-01 PROCEDURE — 0BH17EZ INSERTION OF ENDOTRACHEAL AIRWAY INTO TRACHEA, VIA NATURAL OR ARTIFICIAL OPENING: ICD-10-PCS | Performed by: FAMILY MEDICINE

## 2021-01-01 PROCEDURE — 99285 EMERGENCY DEPT VISIT HI MDM: CPT

## 2021-01-01 PROCEDURE — 93005 ELECTROCARDIOGRAM TRACING: CPT

## 2021-01-01 PROCEDURE — 93306 TTE W/DOPPLER COMPLETE: CPT

## 2021-01-01 PROCEDURE — 83880 ASSAY OF NATRIURETIC PEPTIDE: CPT

## 2021-01-01 PROCEDURE — 83605 ASSAY OF LACTIC ACID: CPT

## 2021-01-01 PROCEDURE — 87040 BLOOD CULTURE FOR BACTERIA: CPT

## 2021-01-01 PROCEDURE — 85730 THROMBOPLASTIN TIME PARTIAL: CPT

## 2021-01-01 PROCEDURE — 87086 URINE CULTURE/COLONY COUNT: CPT

## 2021-01-01 PROCEDURE — 96375 TX/PRO/DX INJ NEW DRUG ADDON: CPT

## 2021-01-01 PROCEDURE — 99232 SBSQ HOSP IP/OBS MODERATE 35: CPT | Performed by: FAMILY MEDICINE

## 2021-01-01 PROCEDURE — 81001 URINALYSIS AUTO W/SCOPE: CPT

## 2021-01-01 PROCEDURE — 74011250636 HC RX REV CODE- 250/636: Performed by: PSYCHIATRY & NEUROLOGY

## 2021-01-01 PROCEDURE — 87641 MR-STAPH DNA AMP PROBE: CPT

## 2021-01-01 PROCEDURE — 93880 EXTRACRANIAL BILAT STUDY: CPT

## 2021-01-01 PROCEDURE — 85610 PROTHROMBIN TIME: CPT

## 2021-01-01 PROCEDURE — 5A1955Z RESPIRATORY VENTILATION, GREATER THAN 96 CONSECUTIVE HOURS: ICD-10-PCS | Performed by: FAMILY MEDICINE

## 2021-01-01 PROCEDURE — 65270000029 HC RM PRIVATE

## 2021-01-01 RX ORDER — GLYCOPYRROLATE 1 MG/1
1 TABLET ORAL 2 TIMES DAILY
Status: DISCONTINUED | OUTPATIENT
Start: 2021-01-01 | End: 2021-01-01

## 2021-01-01 RX ORDER — FACIAL-BODY WIPES
10 EACH TOPICAL DAILY PRN
Status: DISCONTINUED | OUTPATIENT
Start: 2021-01-01 | End: 2021-01-01 | Stop reason: HOSPADM

## 2021-01-01 RX ORDER — LEVETIRACETAM 5 MG/ML
500 INJECTION INTRAVASCULAR EVERY 12 HOURS
Status: DISCONTINUED | OUTPATIENT
Start: 2021-01-01 | End: 2021-01-01

## 2021-01-01 RX ORDER — LORAZEPAM 2 MG/ML
2 INJECTION INTRAMUSCULAR
Status: DISCONTINUED | OUTPATIENT
Start: 2021-01-01 | End: 2021-01-01 | Stop reason: HOSPADM

## 2021-01-01 RX ORDER — MORPHINE SULFATE 2 MG/ML
2 INJECTION, SOLUTION INTRAMUSCULAR; INTRAVENOUS
Status: DISCONTINUED | OUTPATIENT
Start: 2021-01-01 | End: 2021-01-01 | Stop reason: HOSPADM

## 2021-01-01 RX ORDER — HEPARIN SODIUM 5000 [USP'U]/ML
5000 INJECTION, SOLUTION INTRAVENOUS; SUBCUTANEOUS EVERY 12 HOURS
Status: DISCONTINUED | OUTPATIENT
Start: 2021-01-01 | End: 2021-01-01

## 2021-01-01 RX ORDER — MORPHINE SULFATE 10 MG/ML
6 INJECTION, SOLUTION INTRAMUSCULAR; INTRAVENOUS
Status: DISCONTINUED | OUTPATIENT
Start: 2021-01-01 | End: 2021-01-01

## 2021-01-01 RX ORDER — MANNITOL 250 MG/ML
12.5 INJECTION, SOLUTION INTRAVENOUS ONCE
Status: COMPLETED | OUTPATIENT
Start: 2021-01-01 | End: 2021-01-01

## 2021-01-01 RX ORDER — ONDANSETRON 4 MG/1
4 TABLET, ORALLY DISINTEGRATING ORAL
Status: DISCONTINUED | OUTPATIENT
Start: 2021-01-01 | End: 2021-01-01 | Stop reason: HOSPADM

## 2021-01-01 RX ORDER — MORPHINE SULFATE 4 MG/ML
4 INJECTION INTRAVENOUS
Status: DISCONTINUED | OUTPATIENT
Start: 2021-01-01 | End: 2021-01-01

## 2021-01-01 RX ORDER — SODIUM,POTASSIUM PHOSPHATES 280-250MG
2 POWDER IN PACKET (EA) ORAL EVERY 4 HOURS
Status: COMPLETED | OUTPATIENT
Start: 2021-01-01 | End: 2021-01-01

## 2021-01-01 RX ORDER — MORPHINE SULFATE 2 MG/ML
2 INJECTION, SOLUTION INTRAMUSCULAR; INTRAVENOUS
Status: DISCONTINUED | OUTPATIENT
Start: 2021-01-01 | End: 2021-01-01

## 2021-01-01 RX ORDER — HEPARIN SODIUM 5000 [USP'U]/ML
5000 INJECTION, SOLUTION INTRAVENOUS; SUBCUTANEOUS 2 TIMES DAILY
Status: DISCONTINUED | OUTPATIENT
Start: 2021-01-01 | End: 2021-01-01

## 2021-01-01 RX ORDER — ACETYLCYSTEINE 200 MG/ML
600 SOLUTION ORAL; RESPIRATORY (INHALATION)
Status: DISCONTINUED | OUTPATIENT
Start: 2021-01-01 | End: 2021-01-01

## 2021-01-01 RX ORDER — PROCHLORPERAZINE EDISYLATE 5 MG/ML
10 INJECTION INTRAMUSCULAR; INTRAVENOUS
Status: COMPLETED | OUTPATIENT
Start: 2021-01-01 | End: 2021-01-01

## 2021-01-01 RX ORDER — ACETAMINOPHEN 325 MG/1
650 TABLET ORAL
Status: DISCONTINUED | OUTPATIENT
Start: 2021-01-01 | End: 2021-01-01 | Stop reason: HOSPADM

## 2021-01-01 RX ORDER — METOCLOPRAMIDE HYDROCHLORIDE 5 MG/ML
10 INJECTION INTRAMUSCULAR; INTRAVENOUS
Status: DISCONTINUED | OUTPATIENT
Start: 2021-01-01 | End: 2021-01-01 | Stop reason: HOSPADM

## 2021-01-01 RX ORDER — ONDANSETRON 2 MG/ML
4 INJECTION INTRAMUSCULAR; INTRAVENOUS
Status: DISCONTINUED | OUTPATIENT
Start: 2021-01-01 | End: 2021-01-01 | Stop reason: HOSPADM

## 2021-01-01 RX ORDER — IPRATROPIUM BROMIDE AND ALBUTEROL SULFATE 2.5; .5 MG/3ML; MG/3ML
3 SOLUTION RESPIRATORY (INHALATION)
Status: DISCONTINUED | OUTPATIENT
Start: 2021-01-01 | End: 2021-01-01

## 2021-01-01 RX ORDER — PROPOFOL 10 MG/ML
0-50 VIAL (ML) INTRAVENOUS
Status: DISCONTINUED | OUTPATIENT
Start: 2021-01-01 | End: 2021-01-01

## 2021-01-01 RX ORDER — ATORVASTATIN CALCIUM 40 MG/1
40 TABLET, FILM COATED ORAL
Status: DISCONTINUED | OUTPATIENT
Start: 2021-01-01 | End: 2021-01-01

## 2021-01-01 RX ORDER — KETOROLAC TROMETHAMINE 30 MG/ML
30 INJECTION, SOLUTION INTRAMUSCULAR; INTRAVENOUS
Status: DISCONTINUED | OUTPATIENT
Start: 2021-01-01 | End: 2021-01-01 | Stop reason: HOSPADM

## 2021-01-01 RX ORDER — GLYCOPYRROLATE 0.2 MG/ML
0.2 INJECTION INTRAMUSCULAR; INTRAVENOUS
Status: DISCONTINUED | OUTPATIENT
Start: 2021-01-01 | End: 2021-01-01

## 2021-01-01 RX ORDER — DEXAMETHASONE SODIUM PHOSPHATE 4 MG/ML
2 INJECTION, SOLUTION INTRA-ARTICULAR; INTRALESIONAL; INTRAMUSCULAR; INTRAVENOUS; SOFT TISSUE EVERY 12 HOURS
Status: DISCONTINUED | OUTPATIENT
Start: 2021-01-01 | End: 2021-01-01

## 2021-01-01 RX ORDER — LORAZEPAM 2 MG/ML
4 INJECTION INTRAMUSCULAR
Status: DISCONTINUED | OUTPATIENT
Start: 2021-01-01 | End: 2021-01-01 | Stop reason: HOSPADM

## 2021-01-01 RX ORDER — ACETAMINOPHEN 650 MG/1
650 SUPPOSITORY RECTAL
Status: DISCONTINUED | OUTPATIENT
Start: 2021-01-01 | End: 2021-01-01 | Stop reason: HOSPADM

## 2021-01-01 RX ORDER — HYDROMORPHONE HYDROCHLORIDE 2 MG/ML
2 INJECTION, SOLUTION INTRAMUSCULAR; INTRAVENOUS; SUBCUTANEOUS
Status: DISCONTINUED | OUTPATIENT
Start: 2021-01-01 | End: 2021-01-01 | Stop reason: HOSPADM

## 2021-01-01 RX ORDER — ASPIRIN 325 MG
325 TABLET ORAL DAILY
Status: DISCONTINUED | OUTPATIENT
Start: 2021-01-01 | End: 2021-01-01

## 2021-01-01 RX ORDER — SODIUM CHLORIDE 9 MG/ML
75 INJECTION, SOLUTION INTRAVENOUS CONTINUOUS
Status: DISCONTINUED | OUTPATIENT
Start: 2021-01-01 | End: 2021-01-01

## 2021-01-01 RX ORDER — POLYETHYLENE GLYCOL 3350 17 G/17G
17 POWDER, FOR SOLUTION ORAL DAILY PRN
Status: DISCONTINUED | OUTPATIENT
Start: 2021-01-01 | End: 2021-01-01 | Stop reason: HOSPADM

## 2021-01-01 RX ORDER — ONDANSETRON 2 MG/ML
4 INJECTION INTRAMUSCULAR; INTRAVENOUS
Status: COMPLETED | OUTPATIENT
Start: 2021-01-01 | End: 2021-01-01

## 2021-01-01 RX ORDER — NOREPINEPHRINE BITARTRATE/D5W 8 MG/250ML
PLASTIC BAG, INJECTION (ML) INTRAVENOUS
Status: COMPLETED
Start: 2021-01-01 | End: 2021-01-01

## 2021-01-01 RX ORDER — PROPOFOL 10 MG/ML
INJECTION, EMULSION INTRAVENOUS
Status: COMPLETED
Start: 2021-01-01 | End: 2021-01-01

## 2021-01-01 RX ORDER — ENOXAPARIN SODIUM 100 MG/ML
40 INJECTION SUBCUTANEOUS DAILY
Status: DISCONTINUED | OUTPATIENT
Start: 2021-01-01 | End: 2021-01-01

## 2021-01-01 RX ORDER — DEXAMETHASONE SODIUM PHOSPHATE 4 MG/ML
4 INJECTION, SOLUTION INTRA-ARTICULAR; INTRALESIONAL; INTRAMUSCULAR; INTRAVENOUS; SOFT TISSUE EVERY 6 HOURS
Status: DISCONTINUED | OUTPATIENT
Start: 2021-01-01 | End: 2021-01-01

## 2021-01-01 RX ORDER — SODIUM CHLORIDE 9 MG/ML
40 INJECTION, SOLUTION INTRAVENOUS CONTINUOUS
Status: DISCONTINUED | OUTPATIENT
Start: 2021-01-01 | End: 2021-01-01

## 2021-01-01 RX ORDER — GLYCOPYRROLATE 0.2 MG/ML
0.2 INJECTION INTRAMUSCULAR; INTRAVENOUS
Status: DISCONTINUED | OUTPATIENT
Start: 2021-01-01 | End: 2021-01-01 | Stop reason: HOSPADM

## 2021-01-01 RX ORDER — NOREPINEPHRINE BITARTRATE/D5W 8 MG/250ML
10 PLASTIC BAG, INJECTION (ML) INTRAVENOUS
Status: DISCONTINUED | OUTPATIENT
Start: 2021-01-01 | End: 2021-01-01

## 2021-01-01 RX ORDER — METOCLOPRAMIDE HYDROCHLORIDE 5 MG/ML
5 INJECTION INTRAMUSCULAR; INTRAVENOUS EVERY 6 HOURS
Status: DISCONTINUED | OUTPATIENT
Start: 2021-01-01 | End: 2021-01-01

## 2021-01-01 RX ADMIN — METOCLOPRAMIDE HYDROCHLORIDE 5 MG: 5 INJECTION INTRAMUSCULAR; INTRAVENOUS at 11:05

## 2021-01-01 RX ADMIN — PROPOFOL 10 MCG/KG/MIN: 10 INJECTION, EMULSION INTRAVENOUS at 17:45

## 2021-01-01 RX ADMIN — MORPHINE SULFATE 2 MG: 2 INJECTION, SOLUTION INTRAMUSCULAR; INTRAVENOUS at 07:53

## 2021-01-01 RX ADMIN — LORAZEPAM 2 MG: 2 INJECTION INTRAMUSCULAR; INTRAVENOUS at 08:35

## 2021-01-01 RX ADMIN — MORPHINE SULFATE 4 MG: 4 INJECTION INTRAVENOUS at 22:45

## 2021-01-01 RX ADMIN — LORAZEPAM 2 MG: 2 INJECTION INTRAMUSCULAR; INTRAVENOUS at 11:58

## 2021-01-01 RX ADMIN — LORAZEPAM 2 MG: 2 INJECTION INTRAMUSCULAR; INTRAVENOUS at 18:07

## 2021-01-01 RX ADMIN — GLYCOPYRROLATE 0.2 MG: 0.2 INJECTION, SOLUTION INTRAMUSCULAR; INTRAVENOUS at 20:09

## 2021-01-01 RX ADMIN — WHITE PETROLATUM,ZINC OXIDE: 57; 17 PASTE TOPICAL at 11:06

## 2021-01-01 RX ADMIN — WHITE PETROLATUM,ZINC OXIDE: 57; 17 PASTE TOPICAL at 10:20

## 2021-01-01 RX ADMIN — PIPERACILLIN AND TAZOBACTAM 3.38 G: 3; .375 INJECTION, POWDER, FOR SOLUTION INTRAVENOUS at 11:05

## 2021-01-01 RX ADMIN — MORPHINE SULFATE 2 MG: 2 INJECTION, SOLUTION INTRAMUSCULAR; INTRAVENOUS at 06:24

## 2021-01-01 RX ADMIN — LEVETIRACETAM 500 MG: 500 INJECTION, SOLUTION INTRAVENOUS at 18:01

## 2021-01-01 RX ADMIN — PIPERACILLIN AND TAZOBACTAM 3.38 G: 3; .375 INJECTION, POWDER, FOR SOLUTION INTRAVENOUS at 12:02

## 2021-01-01 RX ADMIN — LORAZEPAM 2 MG: 2 INJECTION INTRAMUSCULAR; INTRAVENOUS at 08:12

## 2021-01-01 RX ADMIN — LORAZEPAM 2 MG: 2 INJECTION INTRAMUSCULAR; INTRAVENOUS at 02:48

## 2021-01-01 RX ADMIN — WHITE PETROLATUM,ZINC OXIDE: 57; 17 PASTE TOPICAL at 16:26

## 2021-01-01 RX ADMIN — LORAZEPAM 2 MG: 2 INJECTION INTRAMUSCULAR; INTRAVENOUS at 18:04

## 2021-01-01 RX ADMIN — LEVETIRACETAM 500 MG: 500 INJECTION, SOLUTION INTRAVENOUS at 18:38

## 2021-01-01 RX ADMIN — HEPARIN SODIUM 5000 UNITS: 5000 INJECTION INTRAVENOUS; SUBCUTANEOUS at 11:05

## 2021-01-01 RX ADMIN — POTASSIUM & SODIUM PHOSPHATES POWDER PACK 280-160-250 MG 2 PACKET: 280-160-250 PACK at 14:00

## 2021-01-01 RX ADMIN — MORPHINE SULFATE 2 MG: 2 INJECTION, SOLUTION INTRAMUSCULAR; INTRAVENOUS at 22:08

## 2021-01-01 RX ADMIN — LORAZEPAM 2 MG: 2 INJECTION INTRAMUSCULAR; INTRAVENOUS at 08:07

## 2021-01-01 RX ADMIN — LORAZEPAM 2 MG: 2 INJECTION INTRAMUSCULAR; INTRAVENOUS at 19:39

## 2021-01-01 RX ADMIN — POTASSIUM & SODIUM PHOSPHATES POWDER PACK 280-160-250 MG 2 PACKET: 280-160-250 PACK at 21:05

## 2021-01-01 RX ADMIN — ACETAMINOPHEN 650 MG: 325 TABLET ORAL at 15:22

## 2021-01-01 RX ADMIN — LORAZEPAM 2 MG: 2 INJECTION INTRAMUSCULAR; INTRAVENOUS at 22:05

## 2021-01-01 RX ADMIN — PIPERACILLIN AND TAZOBACTAM 3.38 G: 3; .375 INJECTION, POWDER, FOR SOLUTION INTRAVENOUS at 12:28

## 2021-01-01 RX ADMIN — WHITE PETROLATUM,ZINC OXIDE: 57; 17 PASTE TOPICAL at 08:28

## 2021-01-01 RX ADMIN — MORPHINE SULFATE 2 MG: 2 INJECTION, SOLUTION INTRAMUSCULAR; INTRAVENOUS at 20:09

## 2021-01-01 RX ADMIN — LORAZEPAM 2 MG: 2 INJECTION INTRAMUSCULAR; INTRAVENOUS at 10:16

## 2021-01-01 RX ADMIN — HEPARIN SODIUM 5000 UNITS: 5000 INJECTION INTRAVENOUS; SUBCUTANEOUS at 08:52

## 2021-01-01 RX ADMIN — DEXAMETHASONE SODIUM PHOSPHATE 4 MG: 4 INJECTION, SOLUTION INTRA-ARTICULAR; INTRALESIONAL; INTRAMUSCULAR; INTRAVENOUS; SOFT TISSUE at 03:57

## 2021-01-01 RX ADMIN — MORPHINE SULFATE 6 MG: 10 INJECTION INTRAVENOUS at 01:06

## 2021-01-01 RX ADMIN — MORPHINE SULFATE 2 MG: 2 INJECTION, SOLUTION INTRAMUSCULAR; INTRAVENOUS at 13:52

## 2021-01-01 RX ADMIN — DEXAMETHASONE SODIUM PHOSPHATE 4 MG: 4 INJECTION, SOLUTION INTRA-ARTICULAR; INTRALESIONAL; INTRAMUSCULAR; INTRAVENOUS; SOFT TISSUE at 12:08

## 2021-01-01 RX ADMIN — MORPHINE SULFATE 2 MG: 2 INJECTION, SOLUTION INTRAMUSCULAR; INTRAVENOUS at 23:28

## 2021-01-01 RX ADMIN — MORPHINE SULFATE 2 MG: 2 INJECTION, SOLUTION INTRAMUSCULAR; INTRAVENOUS at 04:14

## 2021-01-01 RX ADMIN — LORAZEPAM 2 MG: 2 INJECTION INTRAMUSCULAR; INTRAVENOUS at 20:09

## 2021-01-01 RX ADMIN — GLYCOPYRROLATE 1 MG: 1 TABLET ORAL at 08:28

## 2021-01-01 RX ADMIN — PIPERACILLIN AND TAZOBACTAM 3.38 G: 3; .375 INJECTION, POWDER, FOR SOLUTION INTRAVENOUS at 11:11

## 2021-01-01 RX ADMIN — DEXAMETHASONE SODIUM PHOSPHATE 4 MG: 4 INJECTION, SOLUTION INTRA-ARTICULAR; INTRALESIONAL; INTRAMUSCULAR; INTRAVENOUS; SOFT TISSUE at 13:26

## 2021-01-01 RX ADMIN — DEXAMETHASONE SODIUM PHOSPHATE 4 MG: 4 INJECTION, SOLUTION INTRA-ARTICULAR; INTRALESIONAL; INTRAMUSCULAR; INTRAVENOUS; SOFT TISSUE at 00:35

## 2021-01-01 RX ADMIN — HYDROMORPHONE HYDROCHLORIDE 2 MG: 2 INJECTION, SOLUTION INTRAMUSCULAR; INTRAVENOUS; SUBCUTANEOUS at 16:25

## 2021-01-01 RX ADMIN — DEXAMETHASONE SODIUM PHOSPHATE 4 MG: 4 INJECTION, SOLUTION INTRA-ARTICULAR; INTRALESIONAL; INTRAMUSCULAR; INTRAVENOUS; SOFT TISSUE at 12:28

## 2021-01-01 RX ADMIN — ATORVASTATIN CALCIUM 40 MG: 40 TABLET, FILM COATED ORAL at 21:10

## 2021-01-01 RX ADMIN — MORPHINE SULFATE 4 MG: 4 INJECTION INTRAVENOUS at 06:19

## 2021-01-01 RX ADMIN — DEXAMETHASONE SODIUM PHOSPHATE 4 MG: 4 INJECTION, SOLUTION INTRA-ARTICULAR; INTRALESIONAL; INTRAMUSCULAR; INTRAVENOUS; SOFT TISSUE at 03:05

## 2021-01-01 RX ADMIN — PIPERACILLIN AND TAZOBACTAM 3.38 G: 3; .375 INJECTION, POWDER, FOR SOLUTION INTRAVENOUS at 03:00

## 2021-01-01 RX ADMIN — LORAZEPAM 2 MG: 2 INJECTION INTRAMUSCULAR; INTRAVENOUS at 13:56

## 2021-01-01 RX ADMIN — LORAZEPAM 2 MG: 2 INJECTION INTRAMUSCULAR; INTRAVENOUS at 02:45

## 2021-01-01 RX ADMIN — MORPHINE SULFATE 2 MG: 2 INJECTION, SOLUTION INTRAMUSCULAR; INTRAVENOUS at 18:07

## 2021-01-01 RX ADMIN — HYDROMORPHONE HYDROCHLORIDE 2 MG: 2 INJECTION, SOLUTION INTRAMUSCULAR; INTRAVENOUS; SUBCUTANEOUS at 13:56

## 2021-01-01 RX ADMIN — WHITE PETROLATUM,ZINC OXIDE: 57; 17 PASTE TOPICAL at 21:06

## 2021-01-01 RX ADMIN — LORAZEPAM 2 MG: 2 INJECTION INTRAMUSCULAR; INTRAVENOUS at 04:38

## 2021-01-01 RX ADMIN — GLYCOPYRROLATE 0.2 MG: 0.2 INJECTION, SOLUTION INTRAMUSCULAR; INTRAVENOUS at 15:02

## 2021-01-01 RX ADMIN — WHITE PETROLATUM,ZINC OXIDE: 57; 17 PASTE TOPICAL at 21:11

## 2021-01-01 RX ADMIN — ENOXAPARIN SODIUM 40 MG: 40 INJECTION SUBCUTANEOUS at 09:02

## 2021-01-01 RX ADMIN — GLYCOPYRROLATE 0.2 MG: 0.2 INJECTION, SOLUTION INTRAMUSCULAR; INTRAVENOUS at 07:53

## 2021-01-01 RX ADMIN — DEXAMETHASONE SODIUM PHOSPHATE 4 MG: 4 INJECTION, SOLUTION INTRA-ARTICULAR; INTRALESIONAL; INTRAMUSCULAR; INTRAVENOUS; SOFT TISSUE at 08:28

## 2021-01-01 RX ADMIN — GLYCOPYRROLATE 0.2 MG: 0.2 INJECTION, SOLUTION INTRAMUSCULAR; INTRAVENOUS at 23:28

## 2021-01-01 RX ADMIN — HYDROMORPHONE HYDROCHLORIDE 2 MG: 2 INJECTION, SOLUTION INTRAMUSCULAR; INTRAVENOUS; SUBCUTANEOUS at 21:32

## 2021-01-01 RX ADMIN — PIPERACILLIN AND TAZOBACTAM 3.38 G: 3; .375 INJECTION, POWDER, FOR SOLUTION INTRAVENOUS at 03:25

## 2021-01-01 RX ADMIN — LORAZEPAM 2 MG: 2 INJECTION INTRAMUSCULAR; INTRAVENOUS at 10:39

## 2021-01-01 RX ADMIN — LORAZEPAM 2 MG: 2 INJECTION INTRAMUSCULAR; INTRAVENOUS at 15:02

## 2021-01-01 RX ADMIN — PIPERACILLIN AND TAZOBACTAM 3.38 G: 3; .375 INJECTION, POWDER, FOR SOLUTION INTRAVENOUS at 19:22

## 2021-01-01 RX ADMIN — HYDROMORPHONE HYDROCHLORIDE 2 MG: 2 INJECTION, SOLUTION INTRAMUSCULAR; INTRAVENOUS; SUBCUTANEOUS at 19:49

## 2021-01-01 RX ADMIN — HYDROMORPHONE HYDROCHLORIDE 2 MG: 2 INJECTION, SOLUTION INTRAMUSCULAR; INTRAVENOUS; SUBCUTANEOUS at 06:25

## 2021-01-01 RX ADMIN — SODIUM CHLORIDE 250 ML: 9 INJECTION, SOLUTION INTRAVENOUS at 08:54

## 2021-01-01 RX ADMIN — LEVETIRACETAM 500 MG: 500 INJECTION, SOLUTION INTRAVENOUS at 18:00

## 2021-01-01 RX ADMIN — DEXAMETHASONE SODIUM PHOSPHATE 4 MG: 4 INJECTION, SOLUTION INTRA-ARTICULAR; INTRALESIONAL; INTRAMUSCULAR; INTRAVENOUS; SOFT TISSUE at 08:52

## 2021-01-01 RX ADMIN — ASPIRIN 325 MG ORAL TABLET 325 MG: 325 PILL ORAL at 10:19

## 2021-01-01 RX ADMIN — GLYCOPYRROLATE 1 MG: 1 TABLET ORAL at 14:46

## 2021-01-01 RX ADMIN — MORPHINE SULFATE 4 MG: 4 INJECTION INTRAVENOUS at 16:37

## 2021-01-01 RX ADMIN — DEXAMETHASONE SODIUM PHOSPHATE 4 MG: 4 INJECTION, SOLUTION INTRA-ARTICULAR; INTRALESIONAL; INTRAMUSCULAR; INTRAVENOUS; SOFT TISSUE at 06:10

## 2021-01-01 RX ADMIN — ATORVASTATIN CALCIUM 40 MG: 40 TABLET, FILM COATED ORAL at 21:05

## 2021-01-01 RX ADMIN — LORAZEPAM 2 MG: 2 INJECTION INTRAMUSCULAR; INTRAVENOUS at 05:02

## 2021-01-01 RX ADMIN — PROPOFOL 8 MCG/KG/MIN: 10 INJECTION, EMULSION INTRAVENOUS at 19:36

## 2021-01-01 RX ADMIN — Medication 5 MCG/MIN: at 07:55

## 2021-01-01 RX ADMIN — LORAZEPAM 2 MG: 2 INJECTION INTRAMUSCULAR; INTRAVENOUS at 04:57

## 2021-01-01 RX ADMIN — MORPHINE SULFATE 2 MG: 2 INJECTION, SOLUTION INTRAMUSCULAR; INTRAVENOUS at 04:57

## 2021-01-01 RX ADMIN — GLYCOPYRROLATE 0.2 MG: 0.2 INJECTION, SOLUTION INTRAMUSCULAR; INTRAVENOUS at 11:35

## 2021-01-01 RX ADMIN — LORAZEPAM 2 MG: 2 INJECTION INTRAMUSCULAR; INTRAVENOUS at 03:12

## 2021-01-01 RX ADMIN — LORAZEPAM 2 MG: 2 INJECTION INTRAMUSCULAR; INTRAVENOUS at 04:14

## 2021-01-01 RX ADMIN — GLYCOPYRROLATE 1 MG: 1 TABLET ORAL at 22:28

## 2021-01-01 RX ADMIN — DEXAMETHASONE SODIUM PHOSPHATE 4 MG: 4 INJECTION, SOLUTION INTRA-ARTICULAR; INTRALESIONAL; INTRAMUSCULAR; INTRAVENOUS; SOFT TISSUE at 01:50

## 2021-01-01 RX ADMIN — LORAZEPAM 2 MG: 2 INJECTION INTRAMUSCULAR; INTRAVENOUS at 06:16

## 2021-01-01 RX ADMIN — ENOXAPARIN SODIUM 40 MG: 40 INJECTION SUBCUTANEOUS at 08:00

## 2021-01-01 RX ADMIN — MORPHINE SULFATE 6 MG: 10 INJECTION INTRAVENOUS at 06:16

## 2021-01-01 RX ADMIN — MORPHINE SULFATE 2 MG: 2 INJECTION, SOLUTION INTRAMUSCULAR; INTRAVENOUS at 03:12

## 2021-01-01 RX ADMIN — GLYCOPYRROLATE 1 MG: 1 TABLET ORAL at 21:05

## 2021-01-01 RX ADMIN — MORPHINE SULFATE 2 MG: 2 INJECTION, SOLUTION INTRAMUSCULAR; INTRAVENOUS at 05:43

## 2021-01-01 RX ADMIN — ASPIRIN 325 MG ORAL TABLET 325 MG: 325 PILL ORAL at 09:34

## 2021-01-01 RX ADMIN — PROPOFOL 10 MCG/KG/MIN: 10 INJECTION, EMULSION INTRAVENOUS at 11:11

## 2021-01-01 RX ADMIN — HYDROMORPHONE HYDROCHLORIDE 2 MG: 2 INJECTION, SOLUTION INTRAMUSCULAR; INTRAVENOUS; SUBCUTANEOUS at 11:58

## 2021-01-01 RX ADMIN — GLYCOPYRROLATE 1 MG: 1 TABLET ORAL at 10:20

## 2021-01-01 RX ADMIN — WHITE PETROLATUM,ZINC OXIDE: 57; 17 PASTE TOPICAL at 21:18

## 2021-01-01 RX ADMIN — LORAZEPAM 2 MG: 2 INJECTION INTRAMUSCULAR; INTRAVENOUS at 07:53

## 2021-01-01 RX ADMIN — POTASSIUM & SODIUM PHOSPHATES POWDER PACK 280-160-250 MG 2 PACKET: 280-160-250 PACK at 18:38

## 2021-01-01 RX ADMIN — HYDROMORPHONE HYDROCHLORIDE 2 MG: 2 INJECTION, SOLUTION INTRAMUSCULAR; INTRAVENOUS; SUBCUTANEOUS at 18:15

## 2021-01-01 RX ADMIN — LORAZEPAM 2 MG: 2 INJECTION INTRAMUSCULAR; INTRAVENOUS at 05:43

## 2021-01-01 RX ADMIN — PROPOFOL 15 MCG/KG/MIN: 10 INJECTION, EMULSION INTRAVENOUS at 00:35

## 2021-01-01 RX ADMIN — HYDROMORPHONE HYDROCHLORIDE 2 MG: 2 INJECTION, SOLUTION INTRAMUSCULAR; INTRAVENOUS; SUBCUTANEOUS at 10:16

## 2021-01-01 RX ADMIN — LEVETIRACETAM 500 MG: 500 INJECTION, SOLUTION INTRAVENOUS at 06:12

## 2021-01-01 RX ADMIN — KETOROLAC TROMETHAMINE 30 MG: 30 INJECTION, SOLUTION INTRAMUSCULAR; INTRAVENOUS at 19:49

## 2021-01-01 RX ADMIN — ATORVASTATIN CALCIUM 40 MG: 40 TABLET, FILM COATED ORAL at 21:18

## 2021-01-01 RX ADMIN — ASPIRIN 325 MG ORAL TABLET 325 MG: 325 PILL ORAL at 08:13

## 2021-01-01 RX ADMIN — PROPOFOL 10 MCG/KG/MIN: 10 INJECTION, EMULSION INTRAVENOUS at 18:00

## 2021-01-01 RX ADMIN — SODIUM CHLORIDE 100 ML/HR: 9 INJECTION, SOLUTION INTRAVENOUS at 08:55

## 2021-01-01 RX ADMIN — LORAZEPAM 2 MG: 2 INJECTION INTRAMUSCULAR; INTRAVENOUS at 01:09

## 2021-01-01 RX ADMIN — LEVETIRACETAM 500 MG: 500 INJECTION, SOLUTION INTRAVENOUS at 03:11

## 2021-01-01 RX ADMIN — POTASSIUM & SODIUM PHOSPHATES POWDER PACK 280-160-250 MG 2 PACKET: 280-160-250 PACK at 11:34

## 2021-01-01 RX ADMIN — GLYCOPYRROLATE 0.2 MG: 0.2 INJECTION, SOLUTION INTRAMUSCULAR; INTRAVENOUS at 08:12

## 2021-01-01 RX ADMIN — MORPHINE SULFATE 2 MG: 2 INJECTION, SOLUTION INTRAMUSCULAR; INTRAVENOUS at 01:09

## 2021-01-01 RX ADMIN — HEPARIN SODIUM 5000 UNITS: 5000 INJECTION INTRAVENOUS; SUBCUTANEOUS at 22:39

## 2021-01-01 RX ADMIN — LORAZEPAM 2 MG: 2 INJECTION INTRAMUSCULAR; INTRAVENOUS at 17:57

## 2021-01-01 RX ADMIN — DEXAMETHASONE SODIUM PHOSPHATE 4 MG: 4 INJECTION, SOLUTION INTRA-ARTICULAR; INTRALESIONAL; INTRAMUSCULAR; INTRAVENOUS; SOFT TISSUE at 06:08

## 2021-01-01 RX ADMIN — POTASSIUM & SODIUM PHOSPHATES POWDER PACK 280-160-250 MG 2 PACKET: 280-160-250 PACK at 18:06

## 2021-01-01 RX ADMIN — LORAZEPAM 2 MG: 2 INJECTION INTRAMUSCULAR; INTRAVENOUS at 13:52

## 2021-01-01 RX ADMIN — KETOROLAC TROMETHAMINE 30 MG: 30 INJECTION, SOLUTION INTRAMUSCULAR; INTRAVENOUS at 05:21

## 2021-01-01 RX ADMIN — PROPOFOL 20 MCG/KG/MIN: 10 INJECTION, EMULSION INTRAVENOUS at 03:01

## 2021-01-01 RX ADMIN — MORPHINE SULFATE 6 MG: 10 INJECTION INTRAVENOUS at 08:07

## 2021-01-01 RX ADMIN — DEXAMETHASONE SODIUM PHOSPHATE 4 MG: 4 INJECTION, SOLUTION INTRA-ARTICULAR; INTRALESIONAL; INTRAMUSCULAR; INTRAVENOUS; SOFT TISSUE at 00:00

## 2021-01-01 RX ADMIN — LORAZEPAM 2 MG: 2 INJECTION INTRAMUSCULAR; INTRAVENOUS at 04:27

## 2021-01-01 RX ADMIN — MANNITOL 12.5 G: 12.5 INJECTION, SOLUTION INTRAVENOUS at 18:49

## 2021-01-01 RX ADMIN — DEXAMETHASONE SODIUM PHOSPHATE 4 MG: 4 INJECTION, SOLUTION INTRA-ARTICULAR; INTRALESIONAL; INTRAMUSCULAR; INTRAVENOUS; SOFT TISSUE at 00:08

## 2021-01-01 RX ADMIN — LORAZEPAM 2 MG: 2 INJECTION INTRAMUSCULAR; INTRAVENOUS at 02:37

## 2021-01-01 RX ADMIN — MORPHINE SULFATE 2 MG: 2 INJECTION, SOLUTION INTRAMUSCULAR; INTRAVENOUS at 23:36

## 2021-01-01 RX ADMIN — LORAZEPAM 2 MG: 2 INJECTION INTRAMUSCULAR; INTRAVENOUS at 23:28

## 2021-01-01 RX ADMIN — PIPERACILLIN AND TAZOBACTAM 3.38 G: 3; .375 INJECTION, POWDER, FOR SOLUTION INTRAVENOUS at 19:27

## 2021-01-01 RX ADMIN — LEVETIRACETAM 500 MG: 500 INJECTION, SOLUTION INTRAVENOUS at 06:10

## 2021-01-01 RX ADMIN — WHITE PETROLATUM,ZINC OXIDE: 57; 17 PASTE TOPICAL at 18:00

## 2021-01-01 RX ADMIN — LORAZEPAM 2 MG: 2 INJECTION INTRAMUSCULAR; INTRAVENOUS at 20:38

## 2021-01-01 RX ADMIN — MORPHINE SULFATE 2 MG: 2 INJECTION, SOLUTION INTRAMUSCULAR; INTRAVENOUS at 03:30

## 2021-01-01 RX ADMIN — GLYCOPYRROLATE 0.2 MG: 0.2 INJECTION, SOLUTION INTRAMUSCULAR; INTRAVENOUS at 13:52

## 2021-01-01 RX ADMIN — MORPHINE SULFATE 2 MG: 2 INJECTION, SOLUTION INTRAMUSCULAR; INTRAVENOUS at 09:31

## 2021-01-01 RX ADMIN — LORAZEPAM 2 MG: 2 INJECTION INTRAMUSCULAR; INTRAVENOUS at 04:09

## 2021-01-01 RX ADMIN — LEVETIRACETAM 500 MG: 500 INJECTION, SOLUTION INTRAVENOUS at 06:08

## 2021-01-01 RX ADMIN — SODIUM CHLORIDE 100 ML/HR: 9 INJECTION, SOLUTION INTRAVENOUS at 18:03

## 2021-01-01 RX ADMIN — DEXAMETHASONE SODIUM PHOSPHATE 4 MG: 4 INJECTION, SOLUTION INTRA-ARTICULAR; INTRALESIONAL; INTRAMUSCULAR; INTRAVENOUS; SOFT TISSUE at 11:12

## 2021-01-01 RX ADMIN — DEXAMETHASONE SODIUM PHOSPHATE 4 MG: 4 INJECTION, SOLUTION INTRA-ARTICULAR; INTRALESIONAL; INTRAMUSCULAR; INTRAVENOUS; SOFT TISSUE at 18:00

## 2021-01-01 RX ADMIN — MORPHINE SULFATE 4 MG: 4 INJECTION INTRAVENOUS at 08:34

## 2021-01-01 RX ADMIN — HEPARIN SODIUM 5000 UNITS: 5000 INJECTION INTRAVENOUS; SUBCUTANEOUS at 16:25

## 2021-01-01 RX ADMIN — WHITE PETROLATUM,ZINC OXIDE: 57; 17 PASTE TOPICAL at 08:14

## 2021-01-01 RX ADMIN — LORAZEPAM 2 MG: 2 INJECTION INTRAMUSCULAR; INTRAVENOUS at 16:25

## 2021-01-01 RX ADMIN — MORPHINE SULFATE 2 MG: 2 INJECTION, SOLUTION INTRAMUSCULAR; INTRAVENOUS at 18:04

## 2021-01-01 RX ADMIN — HYDROMORPHONE HYDROCHLORIDE 2 MG: 2 INJECTION, SOLUTION INTRAMUSCULAR; INTRAVENOUS; SUBCUTANEOUS at 10:39

## 2021-01-01 RX ADMIN — GLYCOPYRROLATE 0.2 MG: 0.2 INJECTION, SOLUTION INTRAMUSCULAR; INTRAVENOUS at 00:32

## 2021-01-01 RX ADMIN — LORAZEPAM 2 MG: 2 INJECTION INTRAMUSCULAR; INTRAVENOUS at 07:02

## 2021-01-01 RX ADMIN — LORAZEPAM 2 MG: 2 INJECTION INTRAMUSCULAR; INTRAVENOUS at 22:15

## 2021-01-01 RX ADMIN — PIPERACILLIN AND TAZOBACTAM 3.38 G: 3; .375 INJECTION, POWDER, FOR SOLUTION INTRAVENOUS at 19:36

## 2021-01-01 RX ADMIN — GLYCOPYRROLATE 0.2 MG: 0.2 INJECTION, SOLUTION INTRAMUSCULAR; INTRAVENOUS at 02:55

## 2021-01-01 RX ADMIN — LORAZEPAM 2 MG: 2 INJECTION INTRAMUSCULAR; INTRAVENOUS at 18:36

## 2021-01-01 RX ADMIN — WHITE PETROLATUM,ZINC OXIDE: 57; 17 PASTE TOPICAL at 22:39

## 2021-01-01 RX ADMIN — LORAZEPAM 2 MG: 2 INJECTION INTRAMUSCULAR; INTRAVENOUS at 03:30

## 2021-01-01 RX ADMIN — Medication 10 MCG/MIN: at 20:00

## 2021-01-01 RX ADMIN — POTASSIUM & SODIUM PHOSPHATES POWDER PACK 280-160-250 MG 2 PACKET: 280-160-250 PACK at 10:19

## 2021-01-01 RX ADMIN — HYDROMORPHONE HYDROCHLORIDE 2 MG: 2 INJECTION, SOLUTION INTRAMUSCULAR; INTRAVENOUS; SUBCUTANEOUS at 05:02

## 2021-01-01 RX ADMIN — PROPOFOL 20 MCG/KG/MIN: 10 INJECTION, EMULSION INTRAVENOUS at 13:26

## 2021-01-01 RX ADMIN — LORAZEPAM 2 MG: 2 INJECTION INTRAMUSCULAR; INTRAVENOUS at 12:42

## 2021-01-01 RX ADMIN — MORPHINE SULFATE 2 MG: 2 INJECTION, SOLUTION INTRAMUSCULAR; INTRAVENOUS at 15:02

## 2021-01-01 RX ADMIN — WHITE PETROLATUM,ZINC OXIDE: 57; 17 PASTE TOPICAL at 18:03

## 2021-01-01 RX ADMIN — LORAZEPAM 2 MG: 2 INJECTION INTRAMUSCULAR; INTRAVENOUS at 01:51

## 2021-01-01 RX ADMIN — MORPHINE SULFATE 4 MG: 4 INJECTION INTRAVENOUS at 00:31

## 2021-01-01 RX ADMIN — LORAZEPAM 2 MG: 2 INJECTION INTRAMUSCULAR; INTRAVENOUS at 13:18

## 2021-01-01 RX ADMIN — ENOXAPARIN SODIUM 40 MG: 40 INJECTION SUBCUTANEOUS at 08:13

## 2021-01-01 RX ADMIN — POTASSIUM & SODIUM PHOSPHATES POWDER PACK 280-160-250 MG 2 PACKET: 280-160-250 PACK at 17:49

## 2021-01-01 RX ADMIN — ENOXAPARIN SODIUM 40 MG: 40 INJECTION SUBCUTANEOUS at 09:34

## 2021-01-01 RX ADMIN — DEXAMETHASONE SODIUM PHOSPHATE 4 MG: 4 INJECTION, SOLUTION INTRA-ARTICULAR; INTRALESIONAL; INTRAMUSCULAR; INTRAVENOUS; SOFT TISSUE at 07:40

## 2021-01-01 RX ADMIN — PIPERACILLIN AND TAZOBACTAM 3.38 G: 3; .375 INJECTION, POWDER, FOR SOLUTION INTRAVENOUS at 19:41

## 2021-01-01 RX ADMIN — Medication 15 MCG/MIN: at 20:30

## 2021-01-01 RX ADMIN — LORAZEPAM 2 MG: 2 INJECTION INTRAMUSCULAR; INTRAVENOUS at 15:50

## 2021-01-01 RX ADMIN — LORAZEPAM 2 MG: 2 INJECTION INTRAMUSCULAR; INTRAVENOUS at 06:24

## 2021-01-01 RX ADMIN — MORPHINE SULFATE 4 MG: 4 INJECTION INTRAVENOUS at 22:16

## 2021-01-01 RX ADMIN — LORAZEPAM 2 MG: 2 INJECTION INTRAMUSCULAR; INTRAVENOUS at 00:51

## 2021-01-01 RX ADMIN — LORAZEPAM 2 MG: 2 INJECTION INTRAMUSCULAR; INTRAVENOUS at 06:19

## 2021-01-01 RX ADMIN — PIPERACILLIN AND TAZOBACTAM 3.38 G: 3; .375 INJECTION, POWDER, FOR SOLUTION INTRAVENOUS at 04:02

## 2021-01-01 RX ADMIN — LORAZEPAM 2 MG: 2 INJECTION INTRAMUSCULAR; INTRAVENOUS at 22:40

## 2021-01-01 RX ADMIN — DEXAMETHASONE SODIUM PHOSPHATE 4 MG: 4 INJECTION, SOLUTION INTRA-ARTICULAR; INTRALESIONAL; INTRAMUSCULAR; INTRAVENOUS; SOFT TISSUE at 15:22

## 2021-01-01 RX ADMIN — WHITE PETROLATUM,ZINC OXIDE: 57; 17 PASTE TOPICAL at 16:59

## 2021-01-01 RX ADMIN — MORPHINE SULFATE 4 MG: 4 INJECTION INTRAVENOUS at 23:28

## 2021-01-01 RX ADMIN — KETOROLAC TROMETHAMINE 30 MG: 30 INJECTION, SOLUTION INTRAMUSCULAR; INTRAVENOUS at 13:56

## 2021-01-01 RX ADMIN — WHITE PETROLATUM,ZINC OXIDE: 57; 17 PASTE TOPICAL at 16:00

## 2021-01-01 RX ADMIN — PROPOFOL 20 MCG/KG/MIN: 10 INJECTION, EMULSION INTRAVENOUS at 08:52

## 2021-01-01 RX ADMIN — GLYCOPYRROLATE 0.2 MG: 0.2 INJECTION, SOLUTION INTRAMUSCULAR; INTRAVENOUS at 02:54

## 2021-01-01 RX ADMIN — GLYCOPYRROLATE 0.2 MG: 0.2 INJECTION, SOLUTION INTRAMUSCULAR; INTRAVENOUS at 08:35

## 2021-01-01 RX ADMIN — DEXAMETHASONE SODIUM PHOSPHATE 4 MG: 4 INJECTION, SOLUTION INTRA-ARTICULAR; INTRALESIONAL; INTRAMUSCULAR; INTRAVENOUS; SOFT TISSUE at 06:00

## 2021-01-01 RX ADMIN — ACETAMINOPHEN 650 MG: 325 TABLET ORAL at 21:10

## 2021-01-01 RX ADMIN — PIPERACILLIN AND TAZOBACTAM 3.38 G: 3; .375 INJECTION, POWDER, FOR SOLUTION INTRAVENOUS at 10:21

## 2021-01-01 RX ADMIN — LORAZEPAM 2 MG: 2 INJECTION INTRAMUSCULAR; INTRAVENOUS at 11:35

## 2021-01-01 RX ADMIN — PROPOFOL 10 MCG/KG/MIN: 10 INJECTION, EMULSION INTRAVENOUS at 17:49

## 2021-01-01 RX ADMIN — MORPHINE SULFATE 2 MG: 2 INJECTION, SOLUTION INTRAMUSCULAR; INTRAVENOUS at 11:35

## 2021-01-01 RX ADMIN — MORPHINE SULFATE 2 MG: 2 INJECTION, SOLUTION INTRAMUSCULAR; INTRAVENOUS at 12:42

## 2021-01-01 RX ADMIN — WHITE PETROLATUM,ZINC OXIDE: 57; 17 PASTE TOPICAL at 09:35

## 2021-01-01 RX ADMIN — GLYCOPYRROLATE 1 MG: 1 TABLET ORAL at 08:13

## 2021-01-01 RX ADMIN — LORAZEPAM 2 MG: 2 INJECTION INTRAMUSCULAR; INTRAVENOUS at 22:11

## 2021-01-01 RX ADMIN — GLYCOPYRROLATE 0.2 MG: 0.2 INJECTION, SOLUTION INTRAMUSCULAR; INTRAVENOUS at 04:39

## 2021-01-01 RX ADMIN — LORAZEPAM 2 MG: 2 INJECTION INTRAMUSCULAR; INTRAVENOUS at 04:39

## 2021-01-01 RX ADMIN — GLYCOPYRROLATE 1 MG: 1 TABLET ORAL at 21:10

## 2021-01-01 RX ADMIN — DEXAMETHASONE SODIUM PHOSPHATE 4 MG: 4 INJECTION, SOLUTION INTRA-ARTICULAR; INTRALESIONAL; INTRAMUSCULAR; INTRAVENOUS; SOFT TISSUE at 18:04

## 2021-01-01 RX ADMIN — DEXAMETHASONE SODIUM PHOSPHATE 4 MG: 4 INJECTION, SOLUTION INTRA-ARTICULAR; INTRALESIONAL; INTRAMUSCULAR; INTRAVENOUS; SOFT TISSUE at 18:01

## 2021-01-01 RX ADMIN — HYDROMORPHONE HYDROCHLORIDE 2 MG: 2 INJECTION, SOLUTION INTRAMUSCULAR; INTRAVENOUS; SUBCUTANEOUS at 08:12

## 2021-01-01 RX ADMIN — PIPERACILLIN AND TAZOBACTAM 3.38 G: 3; .375 INJECTION, POWDER, FOR SOLUTION INTRAVENOUS at 12:59

## 2021-01-01 RX ADMIN — MORPHINE SULFATE 4 MG: 4 INJECTION INTRAVENOUS at 02:55

## 2021-01-01 RX ADMIN — WHITE PETROLATUM,ZINC OXIDE: 57; 17 PASTE TOPICAL at 15:59

## 2021-01-01 RX ADMIN — HYDROMORPHONE HYDROCHLORIDE 2 MG: 2 INJECTION, SOLUTION INTRAMUSCULAR; INTRAVENOUS; SUBCUTANEOUS at 00:32

## 2021-01-01 RX ADMIN — LEVETIRACETAM 500 MG: 500 INJECTION, SOLUTION INTRAVENOUS at 18:04

## 2021-01-01 RX ADMIN — GLYCOPYRROLATE 0.2 MG: 0.2 INJECTION, SOLUTION INTRAMUSCULAR; INTRAVENOUS at 13:56

## 2021-01-01 RX ADMIN — LORAZEPAM 2 MG: 2 INJECTION INTRAMUSCULAR; INTRAVENOUS at 18:15

## 2021-01-01 RX ADMIN — PROPOFOL 15 MCG/KG/MIN: 10 INJECTION, EMULSION INTRAVENOUS at 15:58

## 2021-01-01 RX ADMIN — DEXAMETHASONE SODIUM PHOSPHATE 4 MG: 4 INJECTION, SOLUTION INTRA-ARTICULAR; INTRALESIONAL; INTRAMUSCULAR; INTRAVENOUS; SOFT TISSUE at 00:13

## 2021-01-01 RX ADMIN — ATORVASTATIN CALCIUM 40 MG: 40 TABLET, FILM COATED ORAL at 22:39

## 2021-01-01 RX ADMIN — POTASSIUM & SODIUM PHOSPHATES POWDER PACK 280-160-250 MG 2 PACKET: 280-160-250 PACK at 15:59

## 2021-01-01 RX ADMIN — POTASSIUM & SODIUM PHOSPHATES POWDER PACK 280-160-250 MG 2 PACKET: 280-160-250 PACK at 22:27

## 2021-01-01 RX ADMIN — LEVETIRACETAM 500 MG: 500 INJECTION, SOLUTION INTRAVENOUS at 19:31

## 2021-01-01 RX ADMIN — SODIUM CHLORIDE 100 ML/HR: 9 INJECTION, SOLUTION INTRAVENOUS at 08:13

## 2021-01-01 RX ADMIN — PIPERACILLIN AND TAZOBACTAM 3.38 G: 3; .375 INJECTION, POWDER, FOR SOLUTION INTRAVENOUS at 21:06

## 2021-01-01 RX ADMIN — LORAZEPAM 2 MG: 2 INJECTION INTRAMUSCULAR; INTRAVENOUS at 02:54

## 2021-01-01 RX ADMIN — LORAZEPAM 2 MG: 2 INJECTION INTRAMUSCULAR; INTRAVENOUS at 12:07

## 2021-01-01 RX ADMIN — ASPIRIN 325 MG ORAL TABLET 325 MG: 325 PILL ORAL at 08:49

## 2021-01-01 RX ADMIN — LORAZEPAM 2 MG: 2 INJECTION INTRAMUSCULAR; INTRAVENOUS at 16:37

## 2021-01-01 RX ADMIN — LEVETIRACETAM 500 MG: 500 INJECTION, SOLUTION INTRAVENOUS at 08:49

## 2021-01-01 RX ADMIN — MORPHINE SULFATE 2 MG: 2 INJECTION, SOLUTION INTRAMUSCULAR; INTRAVENOUS at 23:40

## 2021-01-01 RX ADMIN — PIPERACILLIN AND TAZOBACTAM 3.38 G: 3; .375 INJECTION, POWDER, FOR SOLUTION INTRAVENOUS at 04:06

## 2021-01-01 RX ADMIN — SODIUM CHLORIDE 100 ML/HR: 9 INJECTION, SOLUTION INTRAVENOUS at 10:19

## 2021-01-01 RX ADMIN — HEPARIN SODIUM 5000 UNITS: 5000 INJECTION INTRAVENOUS; SUBCUTANEOUS at 21:17

## 2021-01-01 RX ADMIN — MORPHINE SULFATE 2 MG: 2 INJECTION, SOLUTION INTRAMUSCULAR; INTRAVENOUS at 01:51

## 2021-01-01 RX ADMIN — GLYCOPYRROLATE 0.2 MG: 0.2 INJECTION, SOLUTION INTRAMUSCULAR; INTRAVENOUS at 10:39

## 2021-01-01 RX ADMIN — DEXAMETHASONE SODIUM PHOSPHATE 4 MG: 4 INJECTION, SOLUTION INTRA-ARTICULAR; INTRALESIONAL; INTRAMUSCULAR; INTRAVENOUS; SOFT TISSUE at 18:03

## 2021-01-01 RX ADMIN — ONDANSETRON 4 MG: 2 INJECTION INTRAMUSCULAR; INTRAVENOUS at 02:05

## 2021-01-01 RX ADMIN — PIPERACILLIN AND TAZOBACTAM 3.38 G: 3; .375 INJECTION, POWDER, FOR SOLUTION INTRAVENOUS at 04:52

## 2021-01-01 RX ADMIN — WHITE PETROLATUM,ZINC OXIDE: 57; 17 PASTE TOPICAL at 08:48

## 2021-01-01 RX ADMIN — GLYCOPYRROLATE 0.2 MG: 0.2 INJECTION, SOLUTION INTRAMUSCULAR; INTRAVENOUS at 06:19

## 2021-01-01 RX ADMIN — MORPHINE SULFATE 2 MG: 2 INJECTION, SOLUTION INTRAMUSCULAR; INTRAVENOUS at 13:32

## 2021-01-01 RX ADMIN — MORPHINE SULFATE 4 MG: 4 INJECTION INTRAVENOUS at 17:57

## 2021-01-01 RX ADMIN — PROCHLORPERAZINE EDISYLATE 10 MG: 5 INJECTION INTRAMUSCULAR; INTRAVENOUS at 02:30

## 2021-01-01 RX ADMIN — GLYCOPYRROLATE 1 MG: 1 TABLET ORAL at 08:53

## 2021-01-01 RX ADMIN — WHITE PETROLATUM,ZINC OXIDE: 57; 17 PASTE TOPICAL at 22:02

## 2021-01-01 RX ADMIN — LORAZEPAM 2 MG: 2 INJECTION INTRAMUSCULAR; INTRAVENOUS at 20:48

## 2021-01-01 RX ADMIN — ASPIRIN 325 MG ORAL TABLET 325 MG: 325 PILL ORAL at 08:28

## 2021-01-01 RX ADMIN — LEVETIRACETAM 500 MG: 500 INJECTION, SOLUTION INTRAVENOUS at 07:40

## 2021-01-01 RX ADMIN — LORAZEPAM 2 MG: 2 INJECTION INTRAMUSCULAR; INTRAVENOUS at 00:32

## 2021-01-01 RX ADMIN — HYDROMORPHONE HYDROCHLORIDE 2 MG: 2 INJECTION, SOLUTION INTRAMUSCULAR; INTRAVENOUS; SUBCUTANEOUS at 02:37

## 2021-01-01 RX ADMIN — WHITE PETROLATUM,ZINC OXIDE: 57; 17 PASTE TOPICAL at 22:28

## 2021-01-01 RX ADMIN — LORAZEPAM 2 MG: 2 INJECTION INTRAMUSCULAR; INTRAVENOUS at 04:24

## 2021-01-01 RX ADMIN — MORPHINE SULFATE 6 MG: 10 INJECTION INTRAVENOUS at 04:27

## 2021-01-01 RX ADMIN — LORAZEPAM 2 MG: 2 INJECTION INTRAMUSCULAR; INTRAVENOUS at 23:41

## 2021-01-01 RX ADMIN — MORPHINE SULFATE 2 MG: 2 INJECTION, SOLUTION INTRAMUSCULAR; INTRAVENOUS at 23:51

## 2021-01-01 RX ADMIN — HYDROMORPHONE HYDROCHLORIDE 2 MG: 2 INJECTION, SOLUTION INTRAMUSCULAR; INTRAVENOUS; SUBCUTANEOUS at 22:11

## 2021-01-01 RX ADMIN — POTASSIUM & SODIUM PHOSPHATES POWDER PACK 280-160-250 MG 2 PACKET: 280-160-250 PACK at 12:08

## 2021-01-01 RX ADMIN — DEXAMETHASONE SODIUM PHOSPHATE 4 MG: 4 INJECTION, SOLUTION INTRA-ARTICULAR; INTRALESIONAL; INTRAMUSCULAR; INTRAVENOUS; SOFT TISSUE at 13:00

## 2021-01-01 RX ADMIN — LORAZEPAM 2 MG: 2 INJECTION INTRAMUSCULAR; INTRAVENOUS at 22:01

## 2021-01-01 RX ADMIN — MORPHINE SULFATE 4 MG: 4 INJECTION INTRAVENOUS at 20:38

## 2021-01-01 RX ADMIN — LORAZEPAM 2 MG: 2 INJECTION INTRAMUSCULAR; INTRAVENOUS at 10:57

## 2021-01-01 RX ADMIN — ATORVASTATIN CALCIUM 40 MG: 40 TABLET, FILM COATED ORAL at 22:28

## 2021-01-01 RX ADMIN — LORAZEPAM 2 MG: 2 INJECTION INTRAMUSCULAR; INTRAVENOUS at 06:25

## 2021-01-01 RX ADMIN — LORAZEPAM 2 MG: 2 INJECTION INTRAMUSCULAR; INTRAVENOUS at 19:49

## 2021-01-01 RX ADMIN — MORPHINE SULFATE 6 MG: 10 INJECTION INTRAVENOUS at 02:45

## 2021-01-01 RX ADMIN — PIPERACILLIN AND TAZOBACTAM 3.38 G: 3; .375 INJECTION, POWDER, FOR SOLUTION INTRAVENOUS at 04:24

## 2021-01-01 RX ADMIN — ENOXAPARIN SODIUM 40 MG: 40 INJECTION SUBCUTANEOUS at 08:28

## 2021-01-01 RX ADMIN — MORPHINE SULFATE 2 MG: 2 INJECTION, SOLUTION INTRAMUSCULAR; INTRAVENOUS at 02:54

## 2021-01-01 RX ADMIN — HYDROMORPHONE HYDROCHLORIDE 2 MG: 2 INJECTION, SOLUTION INTRAMUSCULAR; INTRAVENOUS; SUBCUTANEOUS at 04:39

## 2021-01-01 RX ADMIN — MORPHINE SULFATE 2 MG: 2 INJECTION, SOLUTION INTRAMUSCULAR; INTRAVENOUS at 04:09

## 2021-01-01 RX ADMIN — ASPIRIN 325 MG ORAL TABLET 325 MG: 325 PILL ORAL at 08:00

## 2021-01-01 RX ADMIN — LORAZEPAM 2 MG: 2 INJECTION INTRAMUSCULAR; INTRAVENOUS at 13:31

## 2021-01-01 RX ADMIN — LORAZEPAM 2 MG: 2 INJECTION INTRAMUSCULAR; INTRAVENOUS at 17:17

## 2021-01-01 RX ADMIN — LORAZEPAM 2 MG: 2 INJECTION INTRAMUSCULAR; INTRAVENOUS at 16:50

## 2021-01-01 RX ADMIN — PROPOFOL 25 MCG/KG/MIN: 10 INJECTION, EMULSION INTRAVENOUS at 21:05

## 2021-01-01 RX ADMIN — ASPIRIN 325 MG ORAL TABLET 325 MG: 325 PILL ORAL at 11:06

## 2021-01-01 RX ADMIN — GLYCOPYRROLATE 0.2 MG: 0.2 INJECTION, SOLUTION INTRAMUSCULAR; INTRAVENOUS at 18:07

## 2021-01-01 RX ADMIN — LEVETIRACETAM 500 MG: 500 INJECTION, SOLUTION INTRAVENOUS at 18:03

## 2021-01-01 RX ADMIN — PIPERACILLIN AND TAZOBACTAM 3.38 G: 3; .375 INJECTION, POWDER, FOR SOLUTION INTRAVENOUS at 03:57

## 2021-01-01 RX ADMIN — MORPHINE SULFATE 2 MG: 2 INJECTION, SOLUTION INTRAMUSCULAR; INTRAVENOUS at 17:17

## 2021-01-01 RX ADMIN — LORAZEPAM 2 MG: 2 INJECTION INTRAMUSCULAR; INTRAVENOUS at 02:21

## 2021-01-01 RX ADMIN — LORAZEPAM 2 MG: 2 INJECTION INTRAMUSCULAR; INTRAVENOUS at 14:14

## 2021-01-01 RX ADMIN — LORAZEPAM 2 MG: 2 INJECTION INTRAMUSCULAR; INTRAVENOUS at 00:58

## 2021-01-01 RX ADMIN — GLYCOPYRROLATE 0.2 MG: 0.2 INJECTION, SOLUTION INTRAMUSCULAR; INTRAVENOUS at 22:01

## 2021-01-01 RX ADMIN — ATORVASTATIN CALCIUM 40 MG: 40 TABLET, FILM COATED ORAL at 22:02

## 2021-01-01 RX ADMIN — ATORVASTATIN CALCIUM 40 MG: 40 TABLET, FILM COATED ORAL at 21:57

## 2021-01-01 RX ADMIN — LORAZEPAM 2 MG: 2 INJECTION INTRAMUSCULAR; INTRAVENOUS at 15:43

## 2021-01-01 RX ADMIN — DEXAMETHASONE SODIUM PHOSPHATE 4 MG: 4 INJECTION, SOLUTION INTRA-ARTICULAR; INTRALESIONAL; INTRAMUSCULAR; INTRAVENOUS; SOFT TISSUE at 19:41

## 2021-01-01 RX ADMIN — POTASSIUM & SODIUM PHOSPHATES POWDER PACK 280-160-250 MG 2 PACKET: 280-160-250 PACK at 11:11

## 2021-01-01 RX ADMIN — MORPHINE SULFATE 4 MG: 4 INJECTION INTRAVENOUS at 14:15

## 2021-01-01 RX ADMIN — DEXAMETHASONE SODIUM PHOSPHATE 4 MG: 4 INJECTION, SOLUTION INTRA-ARTICULAR; INTRALESIONAL; INTRAMUSCULAR; INTRAVENOUS; SOFT TISSUE at 18:38

## 2021-01-01 RX ADMIN — GLYCOPYRROLATE 1 MG: 1 TABLET ORAL at 22:39

## 2021-01-01 RX ADMIN — GLYCOPYRROLATE 0.2 MG: 0.2 INJECTION, SOLUTION INTRAMUSCULAR; INTRAVENOUS at 10:31

## 2021-01-01 RX ADMIN — LORAZEPAM 2 MG: 2 INJECTION INTRAMUSCULAR; INTRAVENOUS at 09:31

## 2021-01-01 RX ADMIN — PIPERACILLIN AND TAZOBACTAM 3.38 G: 3; .375 INJECTION, POWDER, FOR SOLUTION INTRAVENOUS at 22:39

## 2021-01-01 RX ADMIN — SODIUM CHLORIDE 100 ML/HR: 9 INJECTION, SOLUTION INTRAVENOUS at 03:59

## 2021-01-01 RX ADMIN — LEVETIRACETAM 500 MG: 500 INJECTION, SOLUTION INTRAVENOUS at 06:00

## 2021-01-01 RX ADMIN — GLYCOPYRROLATE 0.2 MG: 0.2 INJECTION, SOLUTION INTRAMUSCULAR; INTRAVENOUS at 19:49

## 2021-08-04 PROBLEM — R41.82 AMS (ALTERED MENTAL STATUS): Status: ACTIVE | Noted: 2021-01-01

## 2021-08-04 PROBLEM — R41.82 ALTERED MENTAL STATUS: Status: ACTIVE | Noted: 2021-01-01

## 2021-08-04 PROBLEM — R47.1 DYSARTHRIA: Status: ACTIVE | Noted: 2021-01-01

## 2021-08-04 NOTE — CONSULTS
Consult  Pulmonary, Critical Care    Name: Toni Victor MRN: [id number]   : 1955 Hospital: [facility]   Date: 2021  Admission date: 2021 Hospital Day: 1       Subjective/Interval History:   Patient admitted last night with altered mental status has remained unresponsive today. Some notes mention that he had vomiting at home before admission. His admission chest x-ray was relatively clear. He did go to MRI today came back to the floor Dr. Harjit Green saw him at that time he had agonal respirations and anesthesia was called to intubate him. Chest x-ray shows extensive new left-sided infiltrate he is currently on the ventilator has been transferred to the ICU. Dr. Harjit Green asked for me to see him for vent management    Patient Active Problem List   Diagnosis Code    Altered mental status R41.82    Dysarthria R47.1    AMS (altered mental status) R41.82       IMPRESSION:   1. Acute hypoxic respiratory failure  2. Aspiration pneumonia extensive left lung  3. Hypotension questionably sepsis  4. Radiology just called MRI shows cerebellar and brainstem infarct  5. History seizure disorder  6. History intracranial aneurysm repair years ago  7. Chronic left hemiparesis  8. Body mass index is 24.13 kg/m². 9.       RECOMMENDATIONS/PLAN:   1. We will adjust ventilator as blood gases dictate  2. Agree with IV Zosyn  3. We will place on nebulized albuterol with Mucomyst to try and mobilize secretions from the left lung  4. We will use propofol for sedation if necessary  5. Continue IV fluids  6. If further problems with hypotension would repeat fluid bolus and if unresponsive start pressors  7. Subjective/Initial History:   I have reviewed the flowsheet and previous days notes. Seen earlier today on rounds. I was asked by Martin Padilla MD to see Toni Victor a 76 y.o.    male  in consultation for a chief complaint of acute hypoxic respiratory failure aspiration pneumonia      The patient is unable to give any meaningful history or review of systems due to patient factors. Pt now in CCU. Patient PCP: None  PMH:  has a past medical history of CVA (cerebral vascular accident) ([facility] [address] and Seizure ([facility] [address] PSH:   has no past surgical history on file. FHX: family history is not on file. SHX:  reports that he has quit smoking. He has never used smokeless tobacco. He reports previous alcohol use. He reports that he does not use drugs.     Systemic review unobtainable as patient is unresponsive on the ventilator    No Known Allergies   MEDS:   Current Facility-Administered Medications   Medication    propofoL (DIPRIVAN) 10 mg/mL injection    levETIRAcetam (KEPPRA) 500 mg in saline (iso-osm) 100 mL IVPB (premix)    metoclopramide HCl (REGLAN) injection 10 mg    0.9% sodium chloride infusion    acetaminophen (TYLENOL) tablet 650 mg    Or    acetaminophen (TYLENOL) suppository 650 mg    polyethylene glycol (MIRALAX) packet 17 g    ondansetron (ZOFRAN ODT) tablet 4 mg    Or    ondansetron (ZOFRAN) injection 4 mg    enoxaparin (LOVENOX) injection 40 mg    aspirin tablet 325 mg    atorvastatin (LIPITOR) tablet 40 mg    albuterol-ipratropium (DUO-NEB) 2.5 MG-0.5 MG/3 ML    piperacillin-tazobactam (ZOSYN) 3.375 g in 0.9% sodium chloride (MBP/ADV) 100 mL MBP    acetylcysteine (MUCOMYST) 200 mg/mL (20 %) solution 600 mg    propofol (DIPRIVAN) 10 mg/mL infusion        Current Facility-Administered Medications:     propofoL (DIPRIVAN) 10 mg/mL injection, , , ,     levETIRAcetam (KEPPRA) 500 mg in saline (iso-osm) 100 mL IVPB (premix), 500 mg, IntraVENous, Q12H, Lupis Taylor MD, 500 mg at 08/04/21 5915    metoclopramide HCl (REGLAN) injection 10 mg, 10 mg, IntraVENous, Q4H PRN, Lupis Taylor MD    0.9% sodium chloride infusion, 100 mL/hr, IntraVENous, CONTINUOUS, Lupis Taylor MD, Last Rate: 100 mL/hr at 08/04/21 0855, 100 mL/hr at 21 0855    acetaminophen (TYLENOL) tablet 650 mg, 650 mg, Oral, Q6H PRN **OR** acetaminophen (TYLENOL) suppository 650 mg, 650 mg, Rectal, Q6H PRN, Farshad Taylor MD    polyethylene glycol (MIRALAX) packet 17 g, 17 g, Oral, DAILY PRN, Farshad Taylor MD    ondansetron (ZOFRAN ODT) tablet 4 mg, 4 mg, Oral, Q8H PRN **OR** ondansetron (ZOFRAN) injection 4 mg, 4 mg, IntraVENous, Q6H PRN, Lupis Taylor MD    enoxaparin (LOVENOX) injection 40 mg, 40 mg, SubCUTAneous, DAILY, Lupis Taylor MD, 40 mg at 21 0902    aspirin tablet 325 mg, 325 mg, Oral, DAILY, Farshad Taylor MD    atorvastatin (LIPITOR) tablet 40 mg, 40 mg, Oral, QHS, Lupis Taylor MD    albuterol-ipratropium (DUO-NEB) 2.5 MG-0.5 MG/3 ML, 3 mL, Nebulization, Q6H RT, Farshad Taylor MD    piperacillin-tazobactam (ZOSYN) 3.375 g in 0.9% sodium chloride (MBP/ADV) 100 mL MBP, 3.375 g, IntraVENous, Q8H, Lupis Taylor MD    acetylcysteine (MUCOMYST) 200 mg/mL (20 %) solution 600 mg, 600 mg, Nebulization, Q6H RT, Viridiana Noonan MD    propofol (DIPRIVAN) 10 mg/mL infusion, 0-50 mcg/kg/min, IntraVENous, TITRATE, Viridiana Noonan MD      Objective:     Vital Signs: Telemetry:    normal sinus rhythm Intake/Output:   Visit Vitals  BP (!) 152/81 (BP 1 Location: Right upper arm, BP Patient Position: At rest;Lying)   Pulse 85   Temp 97.2 °F (36.2 °C)   Resp 21   Ht 5' 5\" (1.651 m)   Wt 65.8 kg (145 lb)   SpO2 96%   BMI 24.13 kg/m²       Temp (24hrs), Av.5 °F (36.4 °C), Min:97.2 °F (36.2 °C), Max:97.7 °F (36.5 °C)        O2 Device: None (Room air)           Body mass index is 24.13 kg/m². Wt Readings from Last 4 Encounters:   21 65.8 kg (145 lb)        No intake or output data in the 24 hours ending 21 1745    Last shift:      No intake/output data recorded. Last 3 shifts: No intake/output data recorded.        Hemodynamics:    CO:    CI:    CVP:    SVR:   PAP Systolic:    PAP Diastolic:    PVR:    MY39: Ventilator Settings:      Mode Rate TV Press PEEP FiO2 PIP Min. Vent                            Physical Exam:    General:  male; unresponsive on the ventilator   HEENT: NCAT, ET tube in place  Eyes: anicteric; conjunctiva clear no doll's eye reflex  Neck: no nodes, no cuff leak, no accessory MM use. No JVD  Chest: no deformity,   Cardiac: Regular rate and rhythm  Lungs: Relatively clear right anterior lateral and posterior with extremely poor breath sounds over the left chest with rhonchi  Abd: Soft a few bowel sounds no grimacing to palpation  Ext: no edema; no joint swelling; No clubbing  : clear urine  Neuro: On the ventilator on no sedation unresponsive no doll's eye reflex flaccid extremities  Psych-  unable to assess  Skin: warm, dry, no cyanosis;   Pulses: Brachial and radial pulses intact  Capillary: Normal capillary refill      Labs:    Recent Labs     08/04/21  0440 08/04/21  0130   WBC  --  12.5*   HGB  --  13.9   PLT  --  173   INR 1.1  --    APTT 24.7  --      Recent Labs     08/04/21  0130      K 3.8      CO2 29   GLU 99   BUN 24*   CREA 0.78   CA 8.6   ALB 3.4*   ALT 33       Recent Labs     08/04/21  0130   TROIQ <0.05       Lab Results   Component Value Date/Time    Culture result: No growth after 2 hours 08/04/2021 10:25 AM    Culture result: No growth after 2 hours 08/04/2021 10:20 AM        Imaging:  I have personally reviewed the patients radiographs and have reviewed the reports:    CXR Results  (Last 48 hours)  8/4 chest x-ray after intubation shows ET tube a little low left lung reinflated extensive infiltrate persist  8/4 radiology has called MRI or results as acute cerebellar and brainstem infarct               08/04/21 1710  XR CHEST PORT Final result    Impression:  New diffuse airspace opacity in the left lung. Narrative:  Chest, frontal view, 8/4/2021       History: Shortness of breath. Comparison: Chest, same day. Findings:  The cardiac silhouette is stable. The right lung is adequately   expanded and clear. The left lung has diffuse airspace opacity, new as compared   to study performed earlier the same day. Differential considerations include   aspiration, collapse secondary to mucous plugging. Clinical correlation is   recommended. Left pleural effusion is not excluded. No pneumothorax is   identified. Degenerative changes are present in the thoracic spine. 08/04/21 0853  XR CHEST PORT Final result    Impression:  The cardiomediastinal silhouette is appropriate for age, technique,   and lung expansion. Pulmonary vasculature is not congested. The lungs are   essentially clear. No effusion or pneumothorax is seen. Narrative:  1 view               Results from Hospital Encounter encounter on 08/04/21    XR CHEST PORT    Narrative  Chest, frontal view, 8/4/2021    History: Shortness of breath. Comparison: Chest, same day. Findings: The cardiac silhouette is stable. The right lung is adequately  expanded and clear. The left lung has diffuse airspace opacity, new as compared  to study performed earlier the same day. Differential considerations include  aspiration, collapse secondary to mucous plugging. Clinical correlation is  recommended. Left pleural effusion is not excluded. No pneumothorax is  identified. Degenerative changes are present in the thoracic spine. Impression  New diffuse airspace opacity in the left lung. XR CHEST PORT    Narrative  1 view    Impression  The cardiomediastinal silhouette is appropriate for age, technique,  and lung expansion. Pulmonary vasculature is not congested. The lungs are  essentially clear. No effusion or pneumothorax is seen.     Results from [place] encounter on 08/04/21    CT CODE NEURO HEAD WO CONTRAST    Narrative  CT head without contrast    Dose reduction: All CT scans at this facility are performed using dose reduction  optimization techniques as appropriate to a performed exam including the  following: Automated exposure control, adjustments of the mA and/or kV according  to patient size, or use of iterative reconstruction technique. INDICATION: Code neuro    FINDINGS:    No acute intracranial bleed or midline shift. Areas of encephalomalacia in the  right hemisphere and left frontal lobe. Probable white matter small vessel  ischemic changes. Mild ex vacuo dilatation of right lateral ventricle. MRI is  more sensitive for evaluation of brain parenchyma. Atherosclerosis. No acute  skull fracture. Postoperative changes right skull. No fluid in the paranasal  sinuses or mastoid air cells. Caries and periapical infection/abscess involving  multiple teeth. Impression  No acute intracranial bleed. Bilateral areas of encephalomalacia. Atherosclerosis. A noncontrast head CT does not exclude the possibility of an  acute ischemic stroke. MRI is more sensitive for evaluation of brain parenchyma. Clinical correlation is recommended. Follow-up as clinically indicated. The  results were called and verbally communicated to Dr. Quique Vera on 8/4/2021 at  1:00 AM.    Dental disease. Please see full report. Caution aspiration pneumonia with acute respiratory failure secondary to vomiting and brainstem and cerebellar infarct. Can add Decadron to try and prevent edema but it is not likely to help. Thank you for allowing us to participate in the care of this patient.   We will follow along with you    Kelsea Pillai MD

## 2021-08-04 NOTE — CONSULTS
NEURO CONSULT      REASON FOR ADMISSION:  Nonresponsiveness      HISTORY:  Mr. James Cain is 76years old with a history of intracranial aneurysm repair 30 years ago, seizure disorder, hypertension, who is consulted to neurology for having been found unresponsive and brought to the ER. Patient had also vomited. Lives with his son. In the ER, patient had a head CT without contrast that did not show an acute process. He was subsequently admitted to the floor. He is on Keppra 500 mg p.o. twice daily.       ROS:    General:                     No fever, no chills, no sweats, no generalized weakness, no weight loss/gain,                                       No loss of appetite   Eyes:                           No blurred vision, no eye pain, no loss of vision, no double vision  ENT:                            rhinorrhea, no pharyngitis   Respiratory:               No cough, no sputum production, no SOB, no MEDELLIN, no wheezing, no pleuritic pain   Cardiology:                No chest pain, no palpitations, no orthopnea, no PND, no edema, no syncope   Gastrointestinal:       No abdominal pain , no N/V, no diarrhea, no dysphagia, no constipation, no bleeding   Genitourinary:           frequency, no urgency, no dysuria, no hematuria, no incontinence   Muskuloskeletal :      No arthralgia, no myalgia, no back pain  Hematology:              No easy bruising, no nose or gum bleeding, no lymphadenopathy   Dermatological:         No rash, no ulceration, no pruritis, no color change / jaundice  Endocrine:                 hot flashes or polydipsia   Neurological:             Hemiparetic No headache, no dizziness, no confusion,                                              no paresthesia,                                      No Speech difficulties, no memory loss, no gait difficulty  Psychological:          No neelings of anxiety, no depression, no agitation      NEURO EXAM:    Mental status: Lethargic but noninvasive    Cranial nerves: Intact    Motor exam: Left hemiparesis old    Sensory exam: No tactile extinction    Coordination: Poor on the left    Gait and Station: Patient was not ambulated    ASSESSMENT:  It is not very likely that this patient is post ictal.  His seizure would be a partial seizure with secondary generalization.       PLAN:  Seizure precautions  Start prolactin level  Keppra level ASAP  TSH  CK  Ammonia  EEG      ALLERGIES:    No Known Allergies    MEDS:      Current Facility-Administered Medications:     levETIRAcetam (KEPPRA) 500 mg in saline (iso-osm) 100 mL IVPB (premix), 500 mg, IntraVENous, Q12H, Lupis Taylor MD, 500 mg at 08/04/21 2986    metoclopramide HCl (REGLAN) injection 10 mg, 10 mg, IntraVENous, Q4H PRN, Elvin Taylor MD    0.9% sodium chloride infusion, 100 mL/hr, IntraVENous, CONTINUOUS, Lupis Taylor MD, Last Rate: 100 mL/hr at 08/04/21 0855, 100 mL/hr at 08/04/21 0855    acetaminophen (TYLENOL) tablet 650 mg, 650 mg, Oral, Q6H PRN **OR** acetaminophen (TYLENOL) suppository 650 mg, 650 mg, Rectal, Q6H PRN, Elvin Taylor MD    polyethylene glycol (MIRALAX) packet 17 g, 17 g, Oral, DAILY PRN, Elvin Taylor MD    ondansetron (ZOFRAN ODT) tablet 4 mg, 4 mg, Oral, Q8H PRN **OR** ondansetron (ZOFRAN) injection 4 mg, 4 mg, IntraVENous, Q6H PRN, Lupis Taylor MD    enoxaparin (LOVENOX) injection 40 mg, 40 mg, SubCUTAneous, DAILY, Lupis Taylor MD, 40 mg at 08/04/21 0902    aspirin tablet 325 mg, 325 mg, Oral, DAILY, Lupis Taylor MD    atorvastatin (LIPITOR) tablet 40 mg, 40 mg, Oral, QHS, Lupis Taylor MD    LABS:  Recent Results (from the past 24 hour(s))   GLUCOSE, POC    Collection Time: 08/04/21 12:53 AM   Result Value Ref Range    Glucose (POC) 104 65 - 117 mg/dL    Performed by Angie Grace    EKG, 12 LEAD, INITIAL    Collection Time: 08/04/21 12:53 AM   Result Value Ref Range    Ventricular Rate 81 BPM    Atrial Rate 81 BPM    P-R Interval 144 ms QRS Duration 96 ms    Q-T Interval 376 ms    QTC Calculation (Bezet) 436 ms    Calculated P Axis 49 degrees    Calculated R Axis -9 degrees    Calculated T Axis 28 degrees    Diagnosis       Normal sinus rhythm  Minimal voltage criteria for LVH, may be normal variant  Cannot rule out Anterior infarct , age undetermined  Abnormal ECG  No previous ECGs available  Confirmed by Antonette Spears MD, Malaika Vincent ([id number]) on 8/4/2021 10:25:23 AM     CBC WITH AUTOMATED DIFF    Collection Time: 08/04/21  1:30 AM   Result Value Ref Range    WBC 12.5 (H) 4.1 - 11.1 K/uL    RBC 4.63 4.10 - 5.70 M/uL    HGB 13.9 12.1 - 17.0 g/dL    HCT 43.1 36.6 - 50.3 %    MCV 93.1 80.0 - 99.0 FL    MCH 30.0 26.0 - 34.0 PG    MCHC 32.3 30.0 - 36.5 g/dL    RDW 12.9 11.5 - 14.5 %    PLATELET 076 634 - 895 K/uL    MPV 11.6 8.9 - 12.9 FL    NRBC 0.0 0.0  WBC    ABSOLUTE NRBC 0.00 0.00 - 0.01 K/uL    NEUTROPHILS 88 (H) 32 - 75 %    LYMPHOCYTES 6 (L) 12 - 49 %    MONOCYTES 5 5 - 13 %    EOSINOPHILS 0 0 - 7 %    BASOPHILS 0 0 - 1 %    IMMATURE GRANULOCYTES 1 (H) 0 - 0.5 %    ABS. NEUTROPHILS 10.9 (H) 1.8 - 8.0 K/UL    ABS. LYMPHOCYTES 0.8 0.8 - 3.5 K/UL    ABS. MONOCYTES 0.6 0.0 - 1.0 K/UL    ABS. EOSINOPHILS 0.0 0.0 - 0.4 K/UL    ABS. BASOPHILS 0.0 0.0 - 0.1 K/UL    ABS. IMM. GRANS. 0.2 (H) 0.00 - 0.04 K/UL    DF AUTOMATED     METABOLIC PANEL, COMPREHENSIVE    Collection Time: 08/04/21  1:30 AM   Result Value Ref Range    Sodium 137 136 - 145 mmol/L    Potassium 3.8 3.5 - 5.1 mmol/L    Chloride 104 97 - 108 mmol/L    CO2 29 21 - 32 mmol/L    Anion gap 4 (L) 5 - 15 mmol/L    Glucose 99 65 - 100 mg/dL    BUN 24 (H) 6 - 20 mg/dL    Creatinine 0.78 0.70 - 1.30 mg/dL    BUN/Creatinine ratio 31 (H) 12 - 20      GFR est AA >60 >60 ml/min/1.73m2    GFR est non-AA >60 >60 ml/min/1.73m2    Calcium 8.6 8.5 - 10.1 mg/dL    Bilirubin, total 0.5 0.2 - 1.0 mg/dL    AST (SGOT) 33 15 - 37 U/L    ALT (SGPT) 33 12 - 78 U/L    Alk.  phosphatase 70 45 - 117 U/L    Protein, total 7.9 6.4 - 8.2 g/dL    Albumin 3.4 (L) 3.5 - 5.0 g/dL    Globulin 4.5 (H) 2.0 - 4.0 g/dL    A-G Ratio 0.8 (L) 1.1 - 2.2     TROPONIN I    Collection Time: 08/04/21  1:30 AM   Result Value Ref Range    Troponin-I, Qt. <0.05 <0.05 ng/mL   PROTHROMBIN TIME + INR    Collection Time: 08/04/21  4:40 AM   Result Value Ref Range    Prothrombin time 14.0 11.9 - 14.7 sec    INR 1.1 0.9 - 1.1     PTT    Collection Time: 08/04/21  4:40 AM   Result Value Ref Range    aPTT 24.7 21.2 - 34.1 sec    aPTT, therapeutic range   sec   GLUCOSE, POC    Collection Time: 08/04/21  8:15 AM   Result Value Ref Range    Glucose (POC) 122 (H) 65 - 117 mg/dL    Performed by Lucian Hunter W/MICROSCOPIC    Collection Time: 08/04/21 11:00 AM   Result Value Ref Range    Color Yellow/Straw      Appearance Clear Clear      Specific gravity 1.025 1.003 - 1.030      pH (UA) 7.0 5.0 - 8.0      Protein 30 (A) Negative mg/dL    Glucose Negative Negative mg/dL    Ketone 5 (A) Negative mg/dL    Bilirubin Negative Negative      Blood Negative Negative      Urobilinogen 4.0 (H) 0.1 - 1.0 EU/dL    Nitrites Negative Negative      Leukocyte Esterase Negative Negative      WBC 0-5 0 - 4 /hpf    RBC 0-5 0 - 5 /hpf    Bacteria Negative Negative /hpf   DRUG SCREEN, URINE    Collection Time: 08/04/21 11:00 AM   Result Value Ref Range    AMPHETAMINES Negative Negative      BARBITURATES Negative Negative      BENZODIAZEPINES Negative Negative      COCAINE Negative Negative      METHADONE Negative Negative      OPIATES Negative Negative      PCP(PHENCYCLIDINE) Negative Negative      THC (TH-CANNABINOL) Negative Negative      Drug screen comment        This test is a screen for drugs of abuse in a medical setting only (i.e., they are unconfirmed results and as such must not be used for non-medical purposes, e.g.,employment testing, legal testing). Due to its inherent nature, false positive (FP) and false negative (FN) results may be obtained.  Therefore, if necessary for medical care, recommend confirmation of positive findings by GC/MS. Visit Vitals  BP (!) 152/81 (BP 1 Location: Right upper arm, BP Patient Position: At rest;Lying)   Pulse 85   Temp 97.2 °F (36.2 °C)   Resp 21   Ht 5' 5\" (1.651 m)   Wt 65.8 kg (145 lb)   SpO2 96%   BMI 24.13 kg/m²       Imaging:  XR CHEST PORT   Final Result   The cardiomediastinal silhouette is appropriate for age, technique,   and lung expansion. Pulmonary vasculature is not congested. The lungs are   essentially clear. No effusion or pneumothorax is seen. CT CODE NEURO HEAD WO CONTRAST   Final Result      No acute intracranial bleed. Bilateral areas of encephalomalacia. Atherosclerosis. A noncontrast head CT does not exclude the possibility of an   acute ischemic stroke. MRI is more sensitive for evaluation of brain parenchyma. Clinical correlation is recommended. Follow-up as clinically indicated. The   results were called and verbally communicated to Dr. Gely Nayak on 8/4/2021 at   1:00 AM.      Dental disease. Please see full report.       XR CHEST PA LAT    (Results Pending)   DUPLEX CAROTID BILATERAL    (Results Pending)   MRI BRAIN W WO CONT    (Results Pending)

## 2021-08-04 NOTE — PROGRESS NOTES
Patient arrived to unit sleeping and difficult to arouse Patient responds to physical stimuli and spontaneous movements are present. Patient skin assessment completed, vital signs taken, and call bell placed in reach.

## 2021-08-04 NOTE — ROUTINE PROCESS
TRANSFER - OUT REPORT:    Verbal report given to baron (name) on Veverly Friar  being transferred to [address] [place] [address] (unit) for routine progression of care       Report consisted of patients Situation, Background, Assessment and   Recommendations(SBAR). Information from the following report(s) SBAR, ED Summary, MAR, Recent Results and Med Rec Status was reviewed with the receiving nurse. Lines:   Peripheral IV 08/04/21 Anterior;Right Forearm (Active)   Site Assessment Clean, dry, & intact 08/04/21 0104   Phlebitis Assessment 0 08/04/21 0104   Infiltration Assessment 0 08/04/21 0104   Dressing Status Clean, dry, & intact 08/04/21 0104   Dressing Type Tape;Transparent 08/04/21 0104   Hub Color/Line Status Pink;Flushed;Patent 08/04/21 0104   Action Taken Blood drawn 08/04/21 0104        Opportunity for questions and clarification was provided.       Patient transported with:   Registered Nurse

## 2021-08-04 NOTE — PROGRESS NOTES
Attempted to complete bedside swallow evaluation. Per nsg, patient with AMS and limited alertness; not appropriate for evaluation at this time. Will attempt 08-.

## 2021-08-04 NOTE — Clinical Note
Status[de-identified] INPATIENT [101]   Type of Bed: Telemetry [19]   Cardiac Monitoring Required?: Yes   Inpatient Hospitalization Certified Necessary for the Following Reasons: 3. Patient receiving treatment that can only be provided in an inpatient setting (further clarification in H&P documentation)   Admitting Diagnosis: Dysarthria [784.51. ICD-9-CM]   Admitting Diagnosis: Altered mental status [780.97. ICD-9-CM]   Admitting Physician: Jack Guadalupe [[id number]]   Attending Physician: Jack Guadalupe [[id number]]   Estimated Length of Stay: 2 Midnights   Discharge Plan[de-identified] Home with Office Follow-up

## 2021-08-04 NOTE — ROUTINE PROCESS
Primary Nurse Teri Sandhoff, RN and Kin Serra RN performed a dual skin assessment on this patient. Impairment noted - past surgical scar present to left upper head. Patient has linear discolorations present to entire back, resembling moles. MASD present to both axilla - redness and open bilaterally. Scrotum is edematous. Skin tear/crack present to mid sacrum, directly at top of patient gluteal fold. Patient feet exceptionally flaky and dry with thick, long, and discolored toenails bilaterally. Patient generalized skin is dry and flaky. Eliud score is 13.

## 2021-08-04 NOTE — H&P
History and Physical    NAME: Jose Enrique Harman   :  1955   MRN:  [id number]     Date/Time:  2021 11:29 AM    Patient PCP: None  ______________________________________________________________________             Subjective:     CHIEF COMPLAINT:   ams       HISTORY OF PRESENT ILLNESS:       Patient is a 76y.o. year old male Ginette Todd y. o. male with a past medical history significant Intracranial aneurysm repair 30 years ago, seizure disorder (controled on Keppra), hypertension, left-sided complete hemiparesis presents to the ED with cc of decreased responsiveness, questionable seizure-like activity, vomiting.  Patient lives with his son who cares for patient, states that he fed his father approximately 56 with father was at baseline, generally speaking patient awake, responsive, able to ambulate with walker.  Son checked up on father approximate 6 PM noticed father to be slumped over, upon calling to him father responded however was having significant dysarthria.  EMS presented the patient to ED as a stroke alert, at this time CT of the head showed no acute hemorrhage and EKG showed normal sinus rhythm and no ST elevations.      Today the patient is only responsive to painful stimuli, and was sleeping soundly in the bed. Past Medical History:   Diagnosis Date    [facility]cerebral vascular [facility]) [facility]     Seizure ([facility] [facility]         History reviewed. No pertinent surgical history. Social History     Tobacco Use    Smoking status: Former Smoker    Smokeless tobacco: Never Used   Substance Use Topics    Alcohol use: Not Currently        History reviewed. No pertinent family history.     No Known Allergies     Prior to Admission medications    Not on File         Current Facility-Administered Medications:     levETIRAcetam (KEPPRA) 500 mg in saline (iso-osm) 100 mL IVPB (premix), 500 mg, IntraVENous, Q12H, Lupis Taylor MD, 500 mg at 21 0311    metoclopramide HCl (REGLAN) injection 10 mg, 10 mg, IntraVENous, Q4H PRN, Jeremias Taylor MD    0.9% sodium chloride infusion, 100 mL/hr, IntraVENous, CONTINUOUS, Lupis Taylor MD, Last Rate: 100 mL/hr at 08/04/21 0855, 100 mL/hr at 08/04/21 0855    acetaminophen (TYLENOL) tablet 650 mg, 650 mg, Oral, Q6H PRN **OR** acetaminophen (TYLENOL) suppository 650 mg, 650 mg, Rectal, Q6H PRN, Jeremias Taylor MD    polyethylene glycol (MIRALAX) packet 17 g, 17 g, Oral, DAILY PRN, Jeremias Taylor MD    ondansetron (ZOFRAN ODT) tablet 4 mg, 4 mg, Oral, Q8H PRN **OR** ondansetron (ZOFRAN) injection 4 mg, 4 mg, IntraVENous, Q6H PRN, Lupis Taylor MD    enoxaparin (LOVENOX) injection 40 mg, 40 mg, SubCUTAneous, DAILY, Lupis Taylor MD, 40 mg at 08/04/21 0902    aspirin tablet 325 mg, 325 mg, Oral, DAILY, Jeremias Taylor MD    atorvastatin (LIPITOR) tablet 40 mg, 40 mg, Oral, QHS, Lupis Taylor MD    LAB DATA REVIEWED:    Recent Results (from the past 24 hour(s))   GLUCOSE, POC    Collection Time: 08/04/21 12:53 AM   Result Value Ref Range    Glucose (POC) 104 65 - 117 mg/dL    Performed by [facility]    EKG, 12 LEAD, INITIAL    Collection Time: 08/04/21 12:53 AM   Result Value Ref Range    Ventricular Rate 81 BPM    Atrial Rate 81 BPM    P-R Interval 144 ms    QRS Duration 96 ms    Q-T Interval 376 ms    QTC Calculation (Bezet) 436 ms    Calculated P Axis 49 degrees    Calculated R Axis -9 degrees    Calculated T Axis 28 degrees    Diagnosis       Normal sinus rhythm  Minimal voltage criteria for LVH, may be normal variant  Cannot rule out Anterior infarct , age undetermined  Abnormal ECG  No previous ECGs available  Confirmed by [facility] MD, Mark Vera ([id number]) on 8/4/2021 10:25:23 AM     CBC WITH AUTOMATED DIFF    Collection Time: 08/04/21  1:30 AM   Result Value Ref Range    WBC 12.5 (H) 4.1 - 11.1 K/uL    RBC 4.63 4.10 - 5.70 M/uL    HGB 13.9 12.1 - 17.0 g/dL    HCT 43.1 36.6 - 50.3 %    MCV 93.1 80.0 - 99.0 FL    MCH 30.0 26.0 - 34.0 PG    MCHC 32.3 30.0 - 36.5 g/dL    RDW 12.9 11.5 - 14.5 %    PLATELET 547 038 - 895 K/uL    MPV 11.6 8.9 - 12.9 FL    NRBC 0.0 0.0  WBC    ABSOLUTE NRBC 0.00 0.00 - 0.01 K/uL    NEUTROPHILS 88 (H) 32 - 75 %    LYMPHOCYTES 6 (L) 12 - 49 %    MONOCYTES 5 5 - 13 %    EOSINOPHILS 0 0 - 7 %    BASOPHILS 0 0 - 1 %    IMMATURE GRANULOCYTES 1 (H) 0 - 0.5 %    ABS. NEUTROPHILS 10.9 (H) 1.8 - 8.0 K/UL    ABS. LYMPHOCYTES 0.8 0.8 - 3.5 K/UL    ABS. MONOCYTES 0.6 0.0 - 1.0 K/UL    ABS. EOSINOPHILS 0.0 0.0 - 0.4 K/UL    ABS. BASOPHILS 0.0 0.0 - 0.1 K/UL    ABS. IMM. GRANS. 0.2 (H) 0.00 - 0.04 K/UL    DF AUTOMATED     METABOLIC PANEL, COMPREHENSIVE    Collection Time: 08/04/21  1:30 AM   Result Value Ref Range    Sodium 137 136 - 145 mmol/L    Potassium 3.8 3.5 - 5.1 mmol/L    Chloride 104 97 - 108 mmol/L    CO2 29 21 - 32 mmol/L    Anion gap 4 (L) 5 - 15 mmol/L    Glucose 99 65 - 100 mg/dL    BUN 24 (H) 6 - 20 mg/dL    Creatinine 0.78 0.70 - 1.30 mg/dL    BUN/Creatinine ratio 31 (H) 12 - 20      GFR est AA >60 >60 ml/min/1.73m2    GFR est non-AA >60 >60 ml/min/1.73m2    Calcium 8.6 8.5 - 10.1 mg/dL    Bilirubin, total 0.5 0.2 - 1.0 mg/dL    AST (SGOT) 33 15 - 37 U/L    ALT (SGPT) 33 12 - 78 U/L    Alk.  phosphatase 70 45 - 117 U/L    Protein, total 7.9 6.4 - 8.2 g/dL    Albumin 3.4 (L) 3.5 - 5.0 g/dL    Globulin 4.5 (H) 2.0 - 4.0 g/dL    A-G Ratio 0.8 (L) 1.1 - 2.2     TROPONIN I    Collection Time: 08/04/21  1:30 AM   Result Value Ref Range    Troponin-I, Qt. <0.05 <0.05 ng/mL   PROTHROMBIN TIME + INR    Collection Time: 08/04/21  4:40 AM   Result Value Ref Range    Prothrombin time 14.0 11.9 - 14.7 sec    INR 1.1 0.9 - 1.1     PTT    Collection Time: 08/04/21  4:40 AM   Result Value Ref Range    aPTT 24.7 21.2 - 34.1 sec    aPTT, therapeutic range   sec   GLUCOSE, POC    Collection Time: 08/04/21  8:15 AM   Result Value Ref Range    Glucose (POC) 122 (H) 65 - 117 mg/dL    Performed by Hussein Acharya        XR Results (most recent):  Results from Hospital Encounter encounter on 08/04/21    XR CHEST PORT    Narrative  1 view    Impression  The cardiomediastinal silhouette is appropriate for age, technique,  and lung expansion. Pulmonary vasculature is not congested. The lungs are  essentially clear. No effusion or pneumothorax is seen. XR CHEST PORT   Final Result   The cardiomediastinal silhouette is appropriate for age, technique,   and lung expansion. Pulmonary vasculature is not congested. The lungs are   essentially clear. No effusion or pneumothorax is seen. CT CODE NEURO HEAD WO CONTRAST   Final Result      No acute intracranial bleed. Bilateral areas of encephalomalacia. Atherosclerosis. A noncontrast head CT does not exclude the possibility of an   acute ischemic stroke. MRI is more sensitive for evaluation of brain parenchyma. Clinical correlation is recommended. Follow-up as clinically indicated. The   results were called and verbally communicated to Dr. Mayco Arias on 8/4/2021 at   1:00 AM.      Dental disease. Please see full report. XR CHEST PA LAT    (Results Pending)   DUPLEX CAROTID BILATERAL    (Results Pending)        Review of Systems:  Unable to obtain    Objective:   VITALS:    Visit Vitals  BP (!) 152/81 (BP 1 Location: Right upper arm, BP Patient Position: At rest;Lying)   Pulse 85   Temp 97.2 °F (36.2 °C)   Resp 21   Ht 5' 5\" (1.651 m)   Wt 65.8 kg (145 lb)   SpO2 96%   BMI 24.13 kg/m²       Physical Exam:   Constitutional unable to obtain. HENT:   Head: Normocephalic and atraumatic. Eyes: Pupils are equal, round, and reactive to light. EOM are normal.   Cardiovascular: Normal rate, regular rhythm and normal heart sounds. Pulmonary/Chest: Breath sounds normal. No wheezes. No rales. Exhibits no tenderness. Abdominal: Soft. Bowel sounds are normal. There is no abdominal tenderness. There is no rebound and no guarding. Musculoskeletal: Normal range of motion. Neurological: Moves with painful stimuli    ASSESSMENT & PLAN:    Altered mental status  Acute metabolic encephalopathy  Rule out CVA  History of CVA  History of seizures  Hypotensive this morning      Patient admitted to telemetry floor  Neurology consult  Order MRI of the brain  2D echo  Carotid Dopplers  IV fluids  Order chest x-ray UA urine cultures and blood cultures  Neuro check  Seizures precaution    No family contact numbers available at this time      ________________________________________________________________________    Signed: Ren Puga MD

## 2021-08-04 NOTE — PROGRESS NOTES
I came to see the patient we just came from MRI, still unresponsive  Order ABG chest x-ray stat  At that time patient had labored breathing patient not on any oxygen but on 100% nonrebreather still saturation was low rapid response was called anesthesia were called patient was intubated chest x-ray shows left-sided infiltrate likely aspiration    Patient was intubated by the anesthesia  Transfer to ICU  Discussed with the patient family      MRI still pending  Order blood cultures sputum cultures  Lactic acid stat BMP  Repeat the labs in the morning  ABG pending  Repeat chest x-ray

## 2021-08-04 NOTE — ED TRIAGE NOTES
Pt from home. Found by family around [phone], unresponsive. New onset of Slurred speech. Left side deficit from previous CVA.

## 2021-08-04 NOTE — PROGRESS NOTES
Responded to Rapid Response at 1545. Pt unresponsive, BG 98, agonal breathing. Dr. Asher Cardenas at bedside. Intubated at bedside, family present. Transported to room 281 with members of nursing staff, respiratory team, nursing supervisor, and Dr. Asher Cardenas. Admission skin assessment performed with Fabian Joseph RN. No signs of breakdown noted. Excessively dry skin noted all over.

## 2021-08-04 NOTE — MED STUDENT NOTES
General Daily Progress Note          Patient Name:   Mary Patel       YOB: 1955       Age:  76 y.o. Admit Date: 8/4/2021      Subjective:     Mary Patel, 76 y.o. male with a past medical history significant Intracranial aneurysm repair 30 years ago, seizure disorder (controled on Keppra), hypertension, left-sided complete hemiparesis presents to the ED with cc of decreased responsiveness, questionable seizure-like activity, vomiting. Patient lives with his son who cares for patient, states that he fed his father approximately 56 with father was at baseline, generally speaking patient awake, responsive, able to ambulate with walker. Son checked up on father approximate 6 PM noticed father to be slumped over, upon calling to him father responded however was having significant dysarthria. EMS presented the patient to ED as a stroke alert, at this time CT of the head showed no acute hemorrhage and EKG showed normal sinus rhythm and no ST elevations.      Today the patient is only responsive to painful stimuli, and was sleeping soundly in the bed.               Objective:     Visit Vitals  BP (!) 152/81 (BP 1 Location: Right upper arm, BP Patient Position: At rest;Lying)   Pulse 85   Temp 97.2 °F (36.2 °C)   Resp 21   Ht 5' 5\" (1.651 m)   Wt 65.8 kg (145 lb)   SpO2 96%   BMI 24.13 kg/m²        Recent Results (from the past 24 hour(s))   GLUCOSE, POC    Collection Time: 08/04/21 12:53 AM   Result Value Ref Range    Glucose (POC) 104 65 - 117 mg/dL    Performed by Leyla Madison    CBC WITH AUTOMATED DIFF    Collection Time: 08/04/21  1:30 AM   Result Value Ref Range    WBC 12.5 (H) 4.1 - 11.1 K/uL    RBC 4.63 4.10 - 5.70 M/uL    HGB 13.9 12.1 - 17.0 g/dL    HCT 43.1 36.6 - 50.3 %    MCV 93.1 80.0 - 99.0 FL    MCH 30.0 26.0 - 34.0 PG    MCHC 32.3 30.0 - 36.5 g/dL    RDW 12.9 11.5 - 14.5 %    PLATELET 357 112 - 365 K/uL    MPV 11.6 8.9 - 12.9 FL    NRBC 0.0 0.0  WBC ABSOLUTE NRBC 0.00 0.00 - 0.01 K/uL    NEUTROPHILS 88 (H) 32 - 75 %    LYMPHOCYTES 6 (L) 12 - 49 %    MONOCYTES 5 5 - 13 %    EOSINOPHILS 0 0 - 7 %    BASOPHILS 0 0 - 1 %    IMMATURE GRANULOCYTES 1 (H) 0 - 0.5 %    ABS. NEUTROPHILS 10.9 (H) 1.8 - 8.0 K/UL    ABS. LYMPHOCYTES 0.8 0.8 - 3.5 K/UL    ABS. MONOCYTES 0.6 0.0 - 1.0 K/UL    ABS. EOSINOPHILS 0.0 0.0 - 0.4 K/UL    ABS. BASOPHILS 0.0 0.0 - 0.1 K/UL    ABS. IMM. GRANS. 0.2 (H) 0.00 - 0.04 K/UL    DF AUTOMATED     METABOLIC PANEL, COMPREHENSIVE    Collection Time: 08/04/21  1:30 AM   Result Value Ref Range    Sodium 137 136 - 145 mmol/L    Potassium 3.8 3.5 - 5.1 mmol/L    Chloride 104 97 - 108 mmol/L    CO2 29 21 - 32 mmol/L    Anion gap 4 (L) 5 - 15 mmol/L    Glucose 99 65 - 100 mg/dL    BUN 24 (H) 6 - 20 mg/dL    Creatinine 0.78 0.70 - 1.30 mg/dL    BUN/Creatinine ratio 31 (H) 12 - 20      GFR est AA >60 >60 ml/min/1.73m2    GFR est non-AA >60 >60 ml/min/1.73m2    Calcium 8.6 8.5 - 10.1 mg/dL    Bilirubin, total 0.5 0.2 - 1.0 mg/dL    AST (SGOT) 33 15 - 37 U/L    ALT (SGPT) 33 12 - 78 U/L    Alk. phosphatase 70 45 - 117 U/L    Protein, total 7.9 6.4 - 8.2 g/dL    Albumin 3.4 (L) 3.5 - 5.0 g/dL    Globulin 4.5 (H) 2.0 - 4.0 g/dL    A-G Ratio 0.8 (L) 1.1 - 2.2     TROPONIN I    Collection Time: 08/04/21  1:30 AM   Result Value Ref Range    Troponin-I, Qt. <0.05 <0.05 ng/mL   PROTHROMBIN TIME + INR    Collection Time: 08/04/21  4:40 AM   Result Value Ref Range    Prothrombin time 14.0 11.9 - 14.7 sec    INR 1.1 0.9 - 1.1     PTT    Collection Time: 08/04/21  4:40 AM   Result Value Ref Range    aPTT 24.7 21.2 - 34.1 sec    aPTT, therapeutic range   sec   GLUCOSE, POC    Collection Time: 08/04/21  8:15 AM   Result Value Ref Range    Glucose (POC) 122 (H) 65 - 117 mg/dL    Performed by Rocio Montes De Oca      [unfilled]      Review of Systems  Limited secondary to patient being unresponsive at this time.         Physical Exam:      Constitutional: pt is only responsive to painful stimuli. HENT:   Head: Normocephalic and atraumatic. Eyes: Pupils are equal, round, and reactive to light. EOM are normal.   Cardiovascular: Normal rate, regular rhythm and normal heart sounds. Pulmonary/Chest: Breath sounds normal. No wheezes. No rales. Exhibits no tenderness. Abdominal: Soft. Bowel sounds are normal. There is no abdominal tenderness. There is no rebound and no guarding. Musculoskeletal: Normal range of motion. Neurological: pt is only responsive to painful stimuli at this time. CT CODE NEURO HEAD WO CONTRAST   Final Result      No acute intracranial bleed. Bilateral areas of encephalomalacia. Atherosclerosis. A noncontrast head CT does not exclude the possibility of an   acute ischemic stroke. MRI is more sensitive for evaluation of brain parenchyma. Clinical correlation is recommended. Follow-up as clinically indicated. The   results were called and verbally communicated to Dr. Yohana Brock on 8/4/2021 at   1:00 AM.      Dental disease. Please see full report.       XR CHEST PORT    (Results Pending)   XR CHEST PA LAT    (Results Pending)   DUPLEX CAROTID BILATERAL    (Results Pending)        Recent Results (from the past 24 hour(s))   GLUCOSE, POC    Collection Time: 08/04/21 12:53 AM   Result Value Ref Range    Glucose (POC) 104 65 - 117 mg/dL    Performed by Maria Alejandra Benitez    CBC WITH AUTOMATED DIFF    Collection Time: 08/04/21  1:30 AM   Result Value Ref Range    WBC 12.5 (H) 4.1 - 11.1 K/uL    RBC 4.63 4.10 - 5.70 M/uL    HGB 13.9 12.1 - 17.0 g/dL    HCT 43.1 36.6 - 50.3 %    MCV 93.1 80.0 - 99.0 FL    MCH 30.0 26.0 - 34.0 PG    MCHC 32.3 30.0 - 36.5 g/dL    RDW 12.9 11.5 - 14.5 %    PLATELET 418 273 - 125 K/uL    MPV 11.6 8.9 - 12.9 FL    NRBC 0.0 0.0  WBC    ABSOLUTE NRBC 0.00 0.00 - 0.01 K/uL    NEUTROPHILS 88 (H) 32 - 75 %    LYMPHOCYTES 6 (L) 12 - 49 %    MONOCYTES 5 5 - 13 %    EOSINOPHILS 0 0 - 7 %    BASOPHILS 0 0 - 1 %    IMMATURE GRANULOCYTES 1 (H) 0 - 0.5 %    ABS. NEUTROPHILS 10.9 (H) 1.8 - 8.0 K/UL    ABS. LYMPHOCYTES 0.8 0.8 - 3.5 K/UL    ABS. MONOCYTES 0.6 0.0 - 1.0 K/UL    ABS. EOSINOPHILS 0.0 0.0 - 0.4 K/UL    ABS. BASOPHILS 0.0 0.0 - 0.1 K/UL    ABS. IMM. GRANS. 0.2 (H) 0.00 - 0.04 K/UL    DF AUTOMATED     METABOLIC PANEL, COMPREHENSIVE    Collection Time: 08/04/21  1:30 AM   Result Value Ref Range    Sodium 137 136 - 145 mmol/L    Potassium 3.8 3.5 - 5.1 mmol/L    Chloride 104 97 - 108 mmol/L    CO2 29 21 - 32 mmol/L    Anion gap 4 (L) 5 - 15 mmol/L    Glucose 99 65 - 100 mg/dL    BUN 24 (H) 6 - 20 mg/dL    Creatinine 0.78 0.70 - 1.30 mg/dL    BUN/Creatinine ratio 31 (H) 12 - 20      GFR est AA >60 >60 ml/min/1.73m2    GFR est non-AA >60 >60 ml/min/1.73m2    Calcium 8.6 8.5 - 10.1 mg/dL    Bilirubin, total 0.5 0.2 - 1.0 mg/dL    AST (SGOT) 33 15 - 37 U/L    ALT (SGPT) 33 12 - 78 U/L    Alk.  phosphatase 70 45 - 117 U/L    Protein, total 7.9 6.4 - 8.2 g/dL    Albumin 3.4 (L) 3.5 - 5.0 g/dL    Globulin 4.5 (H) 2.0 - 4.0 g/dL    A-G Ratio 0.8 (L) 1.1 - 2.2     TROPONIN I    Collection Time: 08/04/21  1:30 AM   Result Value Ref Range    Troponin-I, Qt. <0.05 <0.05 ng/mL   PROTHROMBIN TIME + INR    Collection Time: 08/04/21  4:40 AM   Result Value Ref Range    Prothrombin time 14.0 11.9 - 14.7 sec    INR 1.1 0.9 - 1.1     PTT    Collection Time: 08/04/21  4:40 AM   Result Value Ref Range    aPTT 24.7 21.2 - 34.1 sec    aPTT, therapeutic range   sec   GLUCOSE, POC    Collection Time: 08/04/21  8:15 AM   Result Value Ref Range    Glucose (POC) 122 (H) 65 - 117 mg/dL    Performed by Jackson Treadwell        Results     Procedure Component Value Units Date/Time    CULTURE, URINE [[id number]]     Order Status: Sent Specimen: Urine from Clean catch     CULTURE, BLOOD #1 [[id number]]     Order Status: Sent Specimen: Blood     CULTURE, BLOOD #2 [[id number]]     Order Status: Sent Specimen: Blood            Labs:     Recent Labs     08/04/21  0130 WBC 12.5*   HGB 13.9   HCT 43.1        Recent Labs     08/04/21  0130      K 3.8      CO2 29   BUN 24*   CREA 0.78   GLU 99   CA 8.6     Recent Labs     08/04/21  0130   ALT 33   AP 70   TBILI 0.5   TP 7.9   ALB 3.4*   GLOB 4.5*     Recent Labs     08/04/21  0440   INR 1.1   PTP 14.0   APTT 24.7      No results for input(s): FE, TIBC, PSAT, FERR in the last 72 hours. No results found for: FOL, RBCF   No results for input(s): PH, PCO2, PO2 in the last 72 hours. Recent Labs     08/04/21  0130   TROIQ <0.05     No results found for: CHOL, CHOLX, CHLST, CHOLV, HDL, HDLP, LDL, LDLC, DLDLP, TGLX, TRIGL, TRIGP, CHHD, CHHDX  Lab Results   Component Value Date/Time    Glucose (POC) 122 (H) 08/04/2021 08:15 AM    Glucose (POC) 104 08/04/2021 12:53 AM     No results found for: COLOR, APPRN, SPGRU, REFSG, HAMLET, PROTU, GLUCU, KETU, BILU, UROU, MARYANN, LEUKU, GLUKE, EPSU, BACTU, WBCU, RBCU, CASTS, UCRY      Assessment:   1. Altered mental status  2. Seizure disorder        Plan:     1. Order MRI for more specific evaluation of possible CVA  2. Wait on Echo results  3. Wait on urinalysis results, secondary to neutropenia and elevated WBC  4. Wait on blood cultures, secondary to neutropenia and elevated WBC  5. Wait on consult from Neurology  6. Wait on results from carotid duplex US.       Current Facility-Administered Medications:     levETIRAcetam (KEPPRA) 500 mg in saline (iso-osm) 100 mL IVPB (premix), 500 mg, IntraVENous, Q12H, Lupis Taylor MD, 500 mg at 08/04/21 0311    metoclopramide HCl (REGLAN) injection 10 mg, 10 mg, IntraVENous, Q4H PRN, Lupis Taylor MD    0.9% sodium chloride infusion, 100 mL/hr, IntraVENous, CONTINUOUS, Lupis Taylor MD, Last Rate: 100 mL/hr at 08/04/21 0855, 100 mL/hr at 08/04/21 0855    acetaminophen (TYLENOL) tablet 650 mg, 650 mg, Oral, Q6H PRN **OR** acetaminophen (TYLENOL) suppository 650 mg, 650 mg, Rectal, Q6H PRN, Gene Taylor MD    polyethylene glycol (MIRALAX) packet 17 g, 17 g, Oral, DAILY PRN, Caesar Taylor MD    ondansetron (ZOFRAN ODT) tablet 4 mg, 4 mg, Oral, Q8H PRN **OR** ondansetron (ZOFRAN) injection 4 mg, 4 mg, IntraVENous, Q6H PRN, Lupis Taylor MD    enoxaparin (LOVENOX) injection 40 mg, 40 mg, SubCUTAneous, DAILY, Lupis Taylor MD, 40 mg at 08/04/21 0902    aspirin tablet 325 mg, 325 mg, Oral, DAILY, Lupis Taylor MD    atorvastatin (LIPITOR) tablet 40 mg, 40 mg, Oral, QHS, Caesar Taylor MD                                                                                              *ATTENTION:  This note has been created by a medical student for educational purposes only. Please do not refer to the content of this note for clinical decision-making, billing, or other purposes. Please see attending physicians note to obtain clinical information on this patient. *

## 2021-08-04 NOTE — PROGRESS NOTES
Unable to perform dysphagia screening due to patient not being alert enough to swallow. Notified speech therapist and she is aware too.

## 2021-08-04 NOTE — PROGRESS NOTES
Patient had 2 low Bp reading. First @ 7690 read 98/55, second @ 2646 read 89/51. Notified Dr. Anna Mitchell. Orders received for 250 cc bolus, then maintenance fluids at 100 cc/hour. Also chest x ray, as well as urinalysis.

## 2021-08-05 NOTE — PROGRESS NOTES
PT eval order received and acknowledged, however, patient admitted to ICU and is currently intubated and sedated. PT eval orders will be discontinued and will need new PT eval order once pt medically stable for evaluation. Thank you.

## 2021-08-05 NOTE — PROGRESS NOTES
Patient w/ decline in status transferred to ICU and currently intubated. Will d/c ST as patient is not appropriate. ST will require new orders to follow once medically appropriate.

## 2021-08-05 NOTE — PROGRESS NOTES
Reason for Admission:  AMS, slurred speech                     RUR Score:  12%                   Plan for utilizing home health:          PCP: First and Last name:  None Per son patient goes to [facility] but did not know the name or clinic     Name of Practice:    Are you a current patient: Yes/No:    Approximate date of last visit:    Can you participate in a virtual visit with your PCP:                     Current Advanced Directive/Advance Care Plan: Full Code- None      Healthcare Decision Maker:   Click here to complete [address] including selection of the Healthcare Decision Maker Relationship (ie \"Primary\")             Primary Decision Maker: Humberto Hicks - [phone]                  Transition of Care Plan:        Mansoor Connelly is a 76 yr old male who presents to hospital due to AMS, slurred speech and questionable seizure like activity. PMH significant for intracranial aneurysm repair, seizure disorder, HTN and L sided complete hemiparesis. Patient is non responsive. Neurology consulted. Telephoned patient's son, Mansoor Connelly [phone] who stated he is the primary caregiver for patient. Baseline function is patient needs assistance with bathing, dressing and feeding. Patient uses a walker with an arm rest for mobility. The son stated patient does to [facility] but did not know the PCP or clinic. Remover home address [address] [address] [address] and Home # [phone]. Correct address of [address] [place] [address] added.      Care Management Interventions  Discharge Durable Medical Equipment: No  Physical Therapy Consult: Yes  Occupational Therapy Consult: Yes  Speech Therapy Consult: Yes  The Patient and/or Patient Representative was Provided with a Choice of Provider and Agrees with the Discharge Plan?: Yes  Name of the Patient Representative Who was Provided with a Choice of Provider and Agrees with the Discharge Plan: Joseph Blum [facility]  Discharge Location  Discharge Placement: Unable to determine at this time

## 2021-08-05 NOTE — MED STUDENT NOTES
General Daily Progress Note          Patient Name:   Gwendolyn Johnson       YOB: 1955       Age:  76 y.o. Admit Date: 8/4/2021      Subjective:   Patient admitted last night with altered mental status has remains unresponsive today. Patient's son reports tat the patient's baseline, he is walking and talking. His admission chest x-ray was relatively clear. He had a follow-up MRI today, after the initial head CT showed no acute changes. When he came back to the floor Dr. Darrell Beyer saw him at that time and he was noted to have agonal respirations and anesthesia was called to intubate him. Chest x-ray shows extensive new left-sided infiltrate he is currently on the ventilator. He has been transferred to the ICU.     Patient on ventilator on 55% O2  Patient posturing now  Patient on neodrip for hypotension        Objective:     Visit Vitals  /85 (BP 1 Location: Left upper arm, BP Patient Position: At rest)   Pulse (!) 112   Temp 99.9 °F (37.7 °C)   Resp 13   Ht 5' 5\" (1.651 m)   Wt 65.8 kg (145 lb)   SpO2 100%   BMI 24.13 kg/m²        Recent Results (from the past 24 hour(s))   GLUCOSE, POC    Collection Time: 08/04/21  5:10 PM   Result Value Ref Range    Glucose (POC) 96 65 - 117 mg/dL    Performed by Dimitry Pulido    BLOOD GAS, ARTERIAL    Collection Time: 08/04/21  5:14 PM   Result Value Ref Range    pH 7.47 (H) 7.35 - 7.45      PCO2 31 (L) 35 - 45 mmHg    PO2 54 (L) 75 - 100 mmHg    O2 SAT 89 (L) >95 %    BICARBONATE 24 22 - 26 mmol/L    BASE DEFICIT 0.2 0 - 2 mmol/L    O2 METHOD VENT      FIO2 100.0 %    MODE Assist Control/Volume Control      Tidal volume 500      SET RATE 12      IPAP/PIP 0      EPAP/CPAP/PEEP 5.0      SITE Right Brachial      KRUNAL'S TEST PASS      Critical value read back DR NIDHI Palmer W/MICROSCOPIC    Collection Time: 08/04/21  6:41 PM   Result Value Ref Range    Color Yellow/Straw      Appearance Clear Clear      Specific gravity 1.026 1.003 - 1.030      pH (UA) 6.0 5.0 - 8.0      Protein 30 (A) Negative mg/dL    Glucose 50 (A) Negative mg/dL    Ketone 5 (A) Negative mg/dL    Bilirubin Negative Negative      Blood Negative Negative      Urobilinogen 4.0 (H) 0.1 - 1.0 EU/dL    Nitrites Negative Negative      Leukocyte Esterase Negative Negative      WBC 0-4 0 - 4 /hpf    RBC 0-5 0 - 5 /hpf    Bacteria Negative Negative /hpf    Mucus Trace /lpf   MRSA SCREEN - PCR (NASAL)    Collection Time: 08/04/21  6:41 PM   Result Value Ref Range    MRSA by PCR, Nasal Not Detected Not Detected     TSH 3RD GENERATION    Collection Time: 08/04/21  7:25 PM   Result Value Ref Range    TSH 0.90 0.36 - 3.74 uIU/mL   LACTIC ACID    Collection Time: 08/04/21  7:25 PM   Result Value Ref Range    Lactic acid 5.5 (HH) 0.4 - 2.0 mmol/L   BNP    Collection Time: 08/04/21  7:25 PM   Result Value Ref Range    NT pro- (H) <125 pg/mL   TROPONIN I    Collection Time: 08/04/21  7:25 PM   Result Value Ref Range    Troponin-I, Qt. 0.38 (H) <3.05 ng/mL   METABOLIC PANEL, COMPREHENSIVE    Collection Time: 08/05/21  1:02 AM   Result Value Ref Range    Sodium 137 136 - 145 mmol/L    Potassium 3.9 3.5 - 5.1 mmol/L    Chloride 108 97 - 108 mmol/L    CO2 19 (L) 21 - 32 mmol/L    Anion gap 10 5 - 15 mmol/L    Glucose 119 (H) 65 - 100 mg/dL    BUN 22 (H) 6 - 20 mg/dL    Creatinine 1.32 (H) 0.70 - 1.30 mg/dL    BUN/Creatinine ratio 17 12 - 20      GFR est AA >60 >60 ml/min/1.73m2    GFR est non-AA 54 (L) >60 ml/min/1.73m2    Calcium 6.1 (LL) 8.5 - 10.1 mg/dL    Bilirubin, total 1.6 (H) 0.2 - 1.0 mg/dL    AST (SGOT) 84 (H) 15 - 37 U/L    ALT (SGPT) <6 (L) 12 - 78 U/L    Alk.  phosphatase 48 45 - 117 U/L    Protein, total 6.6 6.4 - 8.2 g/dL    Albumin 2.7 (L) 3.5 - 5.0 g/dL    Globulin 3.9 2.0 - 4.0 g/dL    A-G Ratio 0.7 (L) 1.1 - 2.2     RENAL FUNCTION PANEL    Collection Time: 08/05/21  1:02 AM   Result Value Ref Range    Sodium 136 136 - 145 mmol/L    Potassium 3.8 3.5 - 5.1 mmol/L    Chloride 107 97 - 108 mmol/L    CO2 23 21 - 32 mmol/L    Anion gap 6 5 - 15 mmol/L    Glucose 125 (H) 65 - 100 mg/dL    BUN 22 (H) 6 - 20 mg/dL    Creatinine 1.29 0.70 - 1.30 mg/dL    BUN/Creatinine ratio 17 12 - 20      GFR est AA >60 >60 ml/min/1.73m2    GFR est non-AA 56 (L) >60 ml/min/1.73m2    Calcium 8.3 (L) 8.5 - 10.1 mg/dL    Phosphorus 1.4 (L) 2.6 - 4.7 mg/dL    Albumin 2.7 (L) 3.5 - 5.0 g/dL   MAGNESIUM    Collection Time: 08/05/21  1:02 AM   Result Value Ref Range    Magnesium 1.9 1.6 - 2.4 mg/dL   TROPONIN I    Collection Time: 08/05/21  1:02 AM   Result Value Ref Range    Troponin-I, Qt. 1.38 (H) <0.05 ng/mL   AMMONIA    Collection Time: 08/05/21  1:02 AM   Result Value Ref Range    Ammonia 37 (H) <32 umol/L   BLOOD GAS, ARTERIAL    Collection Time: 08/05/21  4:00 AM   Result Value Ref Range    pH 7.45 7.35 - 7.45      PCO2 37 35 - 45 mmHg    PO2 134 (H) 75 - 100 mmHg    O2 SAT 98 >95 %    BICARBONATE 26 22 - 26 mmol/L    BASE EXCESS 1.8 0 - 2 mmol/L    O2 METHOD VENT      FIO2 80.0 %    MODE Assist Control/Volume Control      Tidal volume 500      SET RATE 12      IPAP/PIP 0      EPAP/CPAP/PEEP 5.0      SITE Right Radial      KRUNAL'S TEST PASS     CBC WITH AUTOMATED DIFF    Collection Time: 08/05/21  4:15 AM   Result Value Ref Range    WBC 13.3 (H) 4.1 - 11.1 K/uL    RBC 4.79 4.10 - 5.70 M/uL    HGB 14.5 12.1 - 17.0 g/dL    HCT 43.4 36.6 - 50.3 %    MCV 90.6 80.0 - 99.0 FL    MCH 30.3 26.0 - 34.0 PG    MCHC 33.4 30.0 - 36.5 g/dL    RDW 13.1 11.5 - 14.5 %    PLATELET 166 030 - 251 K/uL    MPV 11.9 8.9 - 12.9 FL    NRBC 0.0 0.0  WBC    ABSOLUTE NRBC 0.00 0.00 - 0.01 K/uL    NEUTROPHILS 66 32 - 75 %    BAND NEUTROPHILS 23 (H) 0 - 6 %    LYMPHOCYTES 5 (L) 12 - 49 %    MONOCYTES 6 5 - 13 %    EOSINOPHILS 0 0 - 7 %    BASOPHILS 0 0 - 1 %    IMMATURE GRANULOCYTES 0 %    ABS. NEUTROPHILS 11.8 (H) 1.8 - 8.0 K/UL    ABS. LYMPHOCYTES 0.7 (L) 0.8 - 3.5 K/UL    ABS. MONOCYTES 0.8 0.0 - 1.0 K/UL    ABS.  EOSINOPHILS 0.0 0.0 - 0.4 K/UL    ABS. BASOPHILS 0.0 0.0 - 0.1 K/UL    ABS. IMM. GRANS. 0.0 K/UL    DF Manual      RBC COMMENTS Normocytic, Normochromic     ECHO ADULT COMPLETE    Collection Time: 08/05/21  9:46 AM   Result Value Ref Range    LV ED Vol A4C 44.00 cm3    LV ED Vol A2C 71.00 cm3    LV ES Vol A4C 27.00 cm3    LV ES Vol A2C 24.90 cm3    IVSd 0.83 0.60 - 1.00 cm    LVIDd 4.14 (A) 4.20 - 5.90 cm    LVIDs 2.92 cm    LVOT d 1.90 cm    Left Ventricular Outflow Tract Mean Gradient 3.00 mmHg    LVOT VTI 16.00 cm    LVOT VTI 16.00 cm    LVOT Peak Velocity 114.00 cm/s    LVOT Peak Gradient 5.00 mmHg    LVPWd 0.91 0.60 - 1.00 cm    LV E' Septal Velocity 5.56 cm/s    E/E' septal 13.97     LVOT SV 45.0 cm3    Aortic Valve Systolic Mean Gradient 7.28 mmHg    AoV VTI 12.10 cm    Aortic valve mean velocity 80.40 cm/s    Aortic Valve Systolic Peak Velocity 643.37 cm/s    AoV PG 5.00 mmHg    Aortic Valve Area by Continuity of VTI 3.76 cm2    Aortic Valve Area by Continuity of Peak Velocity 2.89 cm2    Ao Root D 2.70 cm    AO ASC D 2.80 cm    Mitral Valve Max Velocity 145.00 cm/s    MV Peak Gradient 8.00 mmHg    Mitral Valve Deceleration [place] 9,500.00 mm/s2    Mitral Valve Deceleration [place] 9,500.00 mm/s2    Mitral Valve Pressure Half-time 24.00 ms    MV A Aaron 125.00 cm/s    MV E Aaron 77.70 cm/s    MV Mean Gradient 4.00 mmHg    Mitral Valve Annulus Velocity Time Integral 22.40 cm    MVA (PHT) 9.17 cm2    MV E/A 0.62     Est. RA Pressure 5.00 mmHg    Tricuspid Valve Max Velocity 99.90 cm/s    Triscuspid Valve Regurgitation Peak Gradient 4.00 mmHg    RVSP 9.00 mmHg    LV Mass .7 88.0 - 224.0 g    LV Mass AL Index 64.0 49.0 - 115.0 g/m2    NANI/BSA Pk Aaron 1.7 cm2/m2    NANI/BSA VTI 2.2 cm2/m2     [unfilled]      Review of Systems    Limited secondary to unresponsive nature of the patient. Physical Exam:      Constitutional: pt is unresponsive  HENT:   Head: Normocephalic and atraumatic.    Eyes: Pupils are equal, round, and reactive to light. EOM are normal.   Cardiovascular: Normal rate, regular rhythm and normal heart sounds. Pulmonary/Chest: Breath sounds normal. No wheezes. No rales. Exhibits no tenderness. Abdominal: Soft. Bowel sounds are normal. There is no abdominal tenderness. There is no rebound and no guarding. Musculoskeletal: Normal range of motion. Neurological: pt postures (decerebrates) to mild pain and touch    XR CHEST PORT   Final Result      XR CHEST PORT   Final Result   ET tube placed, with tip 7 mm above the doretha. Asymmetric airspace   disease noted throughout the left lung, consistent with pneumonia, improved from   previous. Right lung clear. Stable normal heart size. Calcified aortic knob. XR CHEST PORT   Final Result   New diffuse airspace opacity in the left lung. MRI BRAIN WO CONT   Final Result   1. Restricted diffusion involving the cerebellar hemispheres, nemesio, and   possibly the left midbrain most likely representing acute ischemic infarcts   although the brainstem involvement may be partially artifactual. Consider a   short-term interval follow-up MRI. 2.  Attenuated vertebrobasilar flow voids which may be related to underlying   stenosis. CTA or MRA is recommended for further evaluation. 3.  Extensive multifocal encephalomalacia as described above. Report called to the patient's ICU nurse, Tu Staples at 5:45 PM on   8/4/2021 via phone conversation. XR CHEST PORT   Final Result   The cardiomediastinal silhouette is appropriate for age, technique,   and lung expansion. Pulmonary vasculature is not congested. The lungs are   essentially clear. No effusion or pneumothorax is seen. CT CODE NEURO HEAD WO CONTRAST   Final Result      No acute intracranial bleed. Bilateral areas of encephalomalacia. Atherosclerosis. A noncontrast head CT does not exclude the possibility of an   acute ischemic stroke.  MRI is more sensitive for evaluation of brain parenchyma. Clinical correlation is recommended. Follow-up as clinically indicated. The   results were called and verbally communicated to Dr. Angelique Carrel on 8/4/2021 at   1:00 AM.      Dental disease. Please see full report.       DUPLEX CAROTID BILATERAL    (Results Pending)   XR CHEST PORT    (Results Pending)        Recent Results (from the past 24 hour(s))   GLUCOSE, POC    Collection Time: 08/04/21  5:10 PM   Result Value Ref Range    Glucose (POC) 96 65 - 117 mg/dL    Performed by Kenneth Andrade    BLOOD GAS, ARTERIAL    Collection Time: 08/04/21  5:14 PM   Result Value Ref Range    pH 7.47 (H) 7.35 - 7.45      PCO2 31 (L) 35 - 45 mmHg    PO2 54 (L) 75 - 100 mmHg    O2 SAT 89 (L) >95 %    BICARBONATE 24 22 - 26 mmol/L    BASE DEFICIT 0.2 0 - 2 mmol/L    O2 METHOD VENT      FIO2 100.0 %    MODE Assist Control/Volume Control      Tidal volume 500      SET RATE 12      IPAP/PIP 0      EPAP/CPAP/PEEP 5.0      SITE Right Brachial      KRUNAL'S TEST PASS      Critical value read back DR NIDHI Jackson W/MICROSCOPIC    Collection Time: 08/04/21  6:41 PM   Result Value Ref Range    Color Yellow/Straw      Appearance Clear Clear      Specific gravity 1.026 1.003 - 1.030      pH (UA) 6.0 5.0 - 8.0      Protein 30 (A) Negative mg/dL    Glucose 50 (A) Negative mg/dL    Ketone 5 (A) Negative mg/dL    Bilirubin Negative Negative      Blood Negative Negative      Urobilinogen 4.0 (H) 0.1 - 1.0 EU/dL    Nitrites Negative Negative      Leukocyte Esterase Negative Negative      WBC 0-4 0 - 4 /hpf    RBC 0-5 0 - 5 /hpf    Bacteria Negative Negative /hpf    Mucus Trace /lpf   MRSA SCREEN - PCR (NASAL)    Collection Time: 08/04/21  6:41 PM   Result Value Ref Range    MRSA by PCR, Nasal Not Detected Not Detected     TSH 3RD GENERATION    Collection Time: 08/04/21  7:25 PM   Result Value Ref Range    TSH 0.90 0.36 - 3.74 uIU/mL   LACTIC ACID    Collection Time: 08/04/21  7:25 PM   Result Value Ref Range    Lactic acid 5.5 (HH) 0.4 - 2.0 mmol/L   BNP    Collection Time: 08/04/21  7:25 PM   Result Value Ref Range    NT pro- (H) <125 pg/mL   TROPONIN I    Collection Time: 08/04/21  7:25 PM   Result Value Ref Range    Troponin-I, Qt. 0.38 (H) <1.20 ng/mL   METABOLIC PANEL, COMPREHENSIVE    Collection Time: 08/05/21  1:02 AM   Result Value Ref Range    Sodium 137 136 - 145 mmol/L    Potassium 3.9 3.5 - 5.1 mmol/L    Chloride 108 97 - 108 mmol/L    CO2 19 (L) 21 - 32 mmol/L    Anion gap 10 5 - 15 mmol/L    Glucose 119 (H) 65 - 100 mg/dL    BUN 22 (H) 6 - 20 mg/dL    Creatinine 1.32 (H) 0.70 - 1.30 mg/dL    BUN/Creatinine ratio 17 12 - 20      GFR est AA >60 >60 ml/min/1.73m2    GFR est non-AA 54 (L) >60 ml/min/1.73m2    Calcium 6.1 (LL) 8.5 - 10.1 mg/dL    Bilirubin, total 1.6 (H) 0.2 - 1.0 mg/dL    AST (SGOT) 84 (H) 15 - 37 U/L    ALT (SGPT) <6 (L) 12 - 78 U/L    Alk.  phosphatase 48 45 - 117 U/L    Protein, total 6.6 6.4 - 8.2 g/dL    Albumin 2.7 (L) 3.5 - 5.0 g/dL    Globulin 3.9 2.0 - 4.0 g/dL    A-G Ratio 0.7 (L) 1.1 - 2.2     RENAL FUNCTION PANEL    Collection Time: 08/05/21  1:02 AM   Result Value Ref Range    Sodium 136 136 - 145 mmol/L    Potassium 3.8 3.5 - 5.1 mmol/L    Chloride 107 97 - 108 mmol/L    CO2 23 21 - 32 mmol/L    Anion gap 6 5 - 15 mmol/L    Glucose 125 (H) 65 - 100 mg/dL    BUN 22 (H) 6 - 20 mg/dL    Creatinine 1.29 0.70 - 1.30 mg/dL    BUN/Creatinine ratio 17 12 - 20      GFR est AA >60 >60 ml/min/1.73m2    GFR est non-AA 56 (L) >60 ml/min/1.73m2    Calcium 8.3 (L) 8.5 - 10.1 mg/dL    Phosphorus 1.4 (L) 2.6 - 4.7 mg/dL    Albumin 2.7 (L) 3.5 - 5.0 g/dL   MAGNESIUM    Collection Time: 08/05/21  1:02 AM   Result Value Ref Range    Magnesium 1.9 1.6 - 2.4 mg/dL   TROPONIN I    Collection Time: 08/05/21  1:02 AM   Result Value Ref Range    Troponin-I, Qt. 1.38 (H) <0.05 ng/mL   AMMONIA    Collection Time: 08/05/21  1:02 AM   Result Value Ref Range    Ammonia 37 (H) <32 umol/L   BLOOD GAS, ARTERIAL    Collection Time: 08/05/21  4:00 AM   Result Value Ref Range    pH 7.45 7.35 - 7.45      PCO2 37 35 - 45 mmHg    PO2 134 (H) 75 - 100 mmHg    O2 SAT 98 >95 %    BICARBONATE 26 22 - 26 mmol/L    BASE EXCESS 1.8 0 - 2 mmol/L    O2 METHOD VENT      FIO2 80.0 %    MODE Assist Control/Volume Control      Tidal volume 500      SET RATE 12      IPAP/PIP 0      EPAP/CPAP/PEEP 5.0      SITE Right Radial      KRUNAL'S TEST PASS     CBC WITH AUTOMATED DIFF    Collection Time: 08/05/21  4:15 AM   Result Value Ref Range    WBC 13.3 (H) 4.1 - 11.1 K/uL    RBC 4.79 4.10 - 5.70 M/uL    HGB 14.5 12.1 - 17.0 g/dL    HCT 43.4 36.6 - 50.3 %    MCV 90.6 80.0 - 99.0 FL    MCH 30.3 26.0 - 34.0 PG    MCHC 33.4 30.0 - 36.5 g/dL    RDW 13.1 11.5 - 14.5 %    PLATELET 925 179 - 175 K/uL    MPV 11.9 8.9 - 12.9 FL    NRBC 0.0 0.0  WBC    ABSOLUTE NRBC 0.00 0.00 - 0.01 K/uL    NEUTROPHILS 66 32 - 75 %    BAND NEUTROPHILS 23 (H) 0 - 6 %    LYMPHOCYTES 5 (L) 12 - 49 %    MONOCYTES 6 5 - 13 %    EOSINOPHILS 0 0 - 7 %    BASOPHILS 0 0 - 1 %    IMMATURE GRANULOCYTES 0 %    ABS. NEUTROPHILS 11.8 (H) 1.8 - 8.0 K/UL    ABS. LYMPHOCYTES 0.7 (L) 0.8 - 3.5 K/UL    ABS. MONOCYTES 0.8 0.0 - 1.0 K/UL    ABS. EOSINOPHILS 0.0 0.0 - 0.4 K/UL    ABS. BASOPHILS 0.0 0.0 - 0.1 K/UL    ABS. IMM.  GRANS. 0.0 K/UL    DF Manual      RBC COMMENTS Normocytic, Normochromic     ECHO ADULT COMPLETE    Collection Time: 08/05/21  9:46 AM   Result Value Ref Range    LV ED Vol A4C 44.00 cm3    LV ED Vol A2C 71.00 cm3    LV ES Vol A4C 27.00 cm3    LV ES Vol A2C 24.90 cm3    IVSd 0.83 0.60 - 1.00 cm    LVIDd 4.14 (A) 4.20 - 5.90 cm    LVIDs 2.92 cm    LVOT d 1.90 cm    Left Ventricular Outflow Tract Mean Gradient 3.00 mmHg    LVOT VTI 16.00 cm    LVOT VTI 16.00 cm    LVOT Peak Velocity 114.00 cm/s    LVOT Peak Gradient 5.00 mmHg    LVPWd 0.91 0.60 - 1.00 cm    LV E' Septal Velocity 5.56 cm/s    E/E' septal 13.97     LVOT SV 45.0 cm3    Aortic Valve Systolic Mean Gradient 9.94 mmHg    AoV VTI 12.10 cm    Aortic valve mean velocity 80.40 cm/s    Aortic Valve Systolic Peak Velocity 201.58 cm/s    AoV PG 5.00 mmHg    Aortic Valve Area by Continuity of VTI 3.76 cm2    Aortic Valve Area by Continuity of Peak Velocity 2.89 cm2    Ao Root D 2.70 cm    AO ASC D 2.80 cm    Mitral Valve Max Velocity 145.00 cm/s    MV Peak Gradient 8.00 mmHg    Mitral Valve Deceleration [place] 9,500.00 mm/s2    Mitral Valve Deceleration [place] 9,500.00 mm/s2    Mitral Valve Pressure Half-time 24.00 ms    MV A Aaron 125.00 cm/s    MV E Aaron 77.70 cm/s    MV Mean Gradient 4.00 mmHg    Mitral Valve Annulus Velocity Time Integral 22.40 cm    MVA (PHT) 9.17 cm2    MV E/A 0.62     Est. RA Pressure 5.00 mmHg    Tricuspid Valve Max Velocity 99.90 cm/s    Triscuspid Valve Regurgitation Peak Gradient 4.00 mmHg    RVSP 9.00 mmHg    LV Mass .7 88.0 - 224.0 g    LV Mass AL Index 64.0 49.0 - 115.0 g/m2    NANI/BSA Pk Aaron 1.7 cm2/m2    NANI/BSA VTI 2.2 cm2/m2       Results     Procedure Component Value Units Date/Time    CULTURE, BLOOD, LINE [[id number]] Collected: 08/05/21 0102    Order Status: Completed Specimen: Blood Updated: 08/05/21 0118    CULTURE, RESPIRATORY/SPUTUM/BRONCH [place] STAIN [[id number]] Collected: 08/04/21 2230    Order Status: Completed Specimen: Sputum Updated: 08/04/21 2233    CULTURE, URINE [[id number]] Collected: 08/04/21 1841    Order Status: Completed Specimen: Urine Updated: 08/04/21 2159    MRSA SCREEN - PCR (NASAL) [[id number]] Collected: 08/04/21 1841    Order Status: Completed Specimen: Swab Updated: 08/05/21 0027     MRSA by PCR, Nasal Not Detected       CULTURE, URINE [[id number]] Collected: 08/04/21 1100    Order Status: Completed Specimen: Urine Updated: 08/04/21 1114    CULTURE, BLOOD #2 [[id number]] Collected: 08/04/21 1025    Order Status: Completed Specimen: Blood Updated: 08/05/21 0959     Special Requests: No Special Requests        Culture result: No growth after 21 hours       CULTURE, BLOOD #1 [[id number]] Collected: 08/04/21 1020    Order Status: Completed Specimen: Blood Updated: 08/05/21 0959     Special Requests: No Special Requests        Culture result: No growth after 21 hours              Labs:     Recent Labs     08/05/21  0415 08/04/21  0130   WBC 13.3* 12.5*   HGB 14.5 13.9   HCT 43.4 43.1    173     Recent Labs     08/05/21  0102 08/04/21  0130     136 137   K 3.9  3.8 3.8     107 104   CO2 19*  23 29   BUN 22*  22* 24*   CREA 1.32*  1.29 0.78   *  125* 99   CA 6.1*  8.3* 8.6   MG 1.9  --    PHOS 1.4*  --      Recent Labs     08/05/21  0102 08/04/21  0130   ALT <6* 33   AP 48 70   TBILI 1.6* 0.5   TP 6.6 7.9   ALB 2.7*  2.7* 3.4*   GLOB 3.9 4.5*     Recent Labs     08/04/21  0440   INR 1.1   PTP 14.0   APTT 24.7      No results for input(s): FE, TIBC, PSAT, FERR in the last 72 hours.    No results found for: FOL, RBCF   Recent Labs     08/05/21  0400 08/04/21  1714   PH 7.45 7.47*   PCO2 37 31*   PO2 134* 54*     Recent Labs     08/05/21  0102 08/04/21  1925 08/04/21  0130   TROIQ 1.38* 0.38* <0.05     No results found for: CHOL, CHOLX, CHLST, CHOLV, HDL, HDLP, LDL, LDLC, DLDLP, TGLX, TRIGL, TRIGP, CHHD, CHHDX  Lab Results   Component Value Date/Time    Glucose (POC) 96 08/04/2021 05:10 PM    Glucose (POC) 122 (H) 08/04/2021 08:15 AM    Glucose (POC) 104 08/04/2021 12:53 AM     Lab Results   Component Value Date/Time    Color Yellow/Straw 08/04/2021 06:41 PM    Appearance Clear 08/04/2021 06:41 PM    Specific gravity 1.026 08/04/2021 06:41 PM    pH (UA) 6.0 08/04/2021 06:41 PM    Protein 30 (A) 08/04/2021 06:41 PM    Glucose 50 (A) 08/04/2021 06:41 PM    Ketone 5 (A) 08/04/2021 06:41 PM    Bilirubin Negative 08/04/2021 06:41 PM    Urobilinogen 4.0 (H) 08/04/2021 06:41 PM    Nitrites Negative 08/04/2021 06:41 PM    Leukocyte Esterase Negative 08/04/2021 06:41 PM    Bacteria Negative 08/04/2021 06:41 PM    WBC 0-4 08/04/2021 06:41 PM    RBC 0-5 08/04/2021 06:41 PM Assessment:   Acute Brainstem and cerebellar infarct  Acute hypoxemic respiratory failure intubated  Aspiration pneumonia  Sepsis  History of CVA  History of seizures  Acute kidney injury           Plan:       Patient in ICU on ventilator 55% O2  On propofol drip for sedation  On Keppra 500 IV every 12 hours for seizures  Start on IV Zosyn for aspiration pneumonia  Patient on Levophed drip for hypotension  On aspirin and Lipitor    Continue IV fluids  2D echo carotid Dopplers pending  We will place NG tube and start feeding  Dietitian consult    Overall prognosis poor  Will discuss with the patient family today again      Current Facility-Administered Medications:     potassium, sodium phosphates (NEUTRA-PHOS) packet 2 Packet, 2 Packet, Oral, Q4H, Massiel Thomas MD    levETIRAcetam (KEPPRA) 500 mg in saline (iso-osm) 100 mL IVPB (premix), 500 mg, IntraVENous, Q12H, Lupis Taylor MD, 500 mg at 08/05/21 0610    metoclopramide HCl (REGLAN) injection 10 mg, 10 mg, IntraVENous, Q4H PRN, Lupis Taylor MD    0.9% sodium chloride infusion, 100 mL/hr, IntraVENous, CONTINUOUS, Lupis Taylor MD, Last Rate: 100 mL/hr at 08/04/21 0855, 100 mL/hr at 08/04/21 0855    acetaminophen (TYLENOL) tablet 650 mg, 650 mg, Oral, Q6H PRN **OR** acetaminophen (TYLENOL) suppository 650 mg, 650 mg, Rectal, Q6H PRN, Lupis Taylor MD    polyethylene glycol (MIRALAX) packet 17 g, 17 g, Oral, DAILY PRN, Ngoc Taylor MD    ondansetron (ZOFRAN ODT) tablet 4 mg, 4 mg, Oral, Q8H PRN **OR** ondansetron (ZOFRAN) injection 4 mg, 4 mg, IntraVENous, Q6H PRN, Lupis Taylor MD    enoxaparin (LOVENOX) injection 40 mg, 40 mg, SubCUTAneous, DAILY, Lupis Taylor MD, 40 mg at 08/05/21 0800    aspirin tablet 325 mg, 325 mg, Oral, DAILY, Lupis Taylor MD, 325 mg at 08/05/21 0800    atorvastatin (LIPITOR) tablet 40 mg, 40 mg, Oral, QHS, Lupis Taylor MD, 40 mg at 08/04/21 2157    piperacillin-tazobactam (Esme Maria) 3.375 g in 0.9% sodium chloride (MBP/ADV) 100 mL MBP, 3.375 g, IntraVENous, Q8H, Lupis Taylor MD, Last Rate: 200 mL/hr at 08/05/21 0402, 3.375 g at 08/05/21 0402    propofol (DIPRIVAN) 10 mg/mL infusion, 0-50 mcg/kg/min, IntraVENous, TITRATE, Tin Cobian MD, Last Rate: 3.9 mL/hr at 08/04/21 1800, 10 mcg/kg/min at 08/04/21 1800    dexamethasone (DECADRON) 4 mg/mL injection 4 mg, 4 mg, IntraVENous, Q6H, Tin Cobian MD, 4 mg at 08/05/21 0610    NOREPINephrine (LEVOPHED) 8 mg in 5% dextrose 250mL (32 mcg/mL) infusion, 10 mcg/min, IntraVENous, TITRATE, Lupis Taylor MD, Last Rate: 9.4 mL/hr at 08/05/21 0755, 5 mcg/min at 08/05/21 1358                                                                                              *ATTENTION:  This note has been created by a medical student for educational purposes only. Please do not refer to the content of this note for clinical decision-making, billing, or other purposes. Please see attending physicians note to obtain clinical information on this patient. *

## 2021-08-05 NOTE — PROGRESS NOTES
OT eval order received and acknowledged. OT eval attempted at 8:51 am however patient currently sedated on ventilator. OT evaluation order will be discontinued at this time, patient will need new OT orders placed in chart once medically appropriate to participate with therapy. Thank you.

## 2021-08-05 NOTE — MED STUDENT NOTES
General Daily Progress Note          Patient Name:   Michael Burkett       YOB: 1955       Age:  76 y.o. Admit Date: 8/4/2021      Subjective:   Patient admitted last night with altered mental status has remains unresponsive today. Patient's son reports tat the patient's baseline, he is walking and talking. His admission chest x-ray was relatively clear. He had a follow-up MRI today, after the initial head CT showed no acute changes. When he came back to the floor Dr. Shasta Dias saw him at that time and he was noted to have agonal respirations and anesthesia was called to intubate him. Chest x-ray shows extensive new left-sided infiltrate he is currently on the ventilator. He has been transferred to the ICU.         Objective:     Visit Vitals  /80 (BP 1 Location: Left upper arm, BP Patient Position: At rest)   Pulse (!) 113   Temp 99.9 °F (37.7 °C)   Resp 20   Ht 5' 5\" (1.651 m)   Wt 145 lb (65.8 kg)   SpO2 100%   BMI 24.13 kg/m²        Recent Results (from the past 24 hour(s))   CULTURE, BLOOD    Collection Time: 08/04/21 10:20 AM    Specimen: Blood   Result Value Ref Range    Special Requests: No Special Requests      Culture result: No growth after 19 hours     PROLACTIN    Collection Time: 08/04/21 10:20 AM   Result Value Ref Range    Prolactin 19.0 ng/mL   CULTURE, BLOOD    Collection Time: 08/04/21 10:25 AM    Specimen: Blood   Result Value Ref Range    Special Requests: No Special Requests      Culture result: No growth after 19 hours     URINALYSIS W/MICROSCOPIC    Collection Time: 08/04/21 11:00 AM   Result Value Ref Range    Color Yellow/Straw      Appearance Clear Clear      Specific gravity 1.025 1.003 - 1.030      pH (UA) 7.0 5.0 - 8.0      Protein 30 (A) Negative mg/dL    Glucose Negative Negative mg/dL    Ketone 5 (A) Negative mg/dL    Bilirubin Negative Negative      Blood Negative Negative      Urobilinogen 4.0 (H) 0.1 - 1.0 EU/dL    Nitrites Negative Negative Leukocyte Esterase Negative Negative      WBC 0-5 0 - 4 /hpf    RBC 0-5 0 - 5 /hpf    Bacteria Negative Negative /hpf   DRUG SCREEN, URINE    Collection Time: 08/04/21 11:00 AM   Result Value Ref Range    AMPHETAMINES Negative Negative      BARBITURATES Negative Negative      BENZODIAZEPINES Negative Negative      COCAINE Negative Negative      METHADONE Negative Negative      OPIATES Negative Negative      PCP(PHENCYCLIDINE) Negative Negative      THC (TH-CANNABINOL) Negative Negative      Drug screen comment        This test is a screen for drugs of abuse in a medical setting only (i.e., they are unconfirmed results and as such must not be used for non-medical purposes, e.g.,employment testing, legal testing). Due to its inherent nature, false positive (FP) and false negative (FN) results may be obtained. Therefore, if necessary for medical care, recommend confirmation of positive findings by GC/MS. DRUG SCREEN, URINE    Collection Time: 08/04/21 11:00 AM   Result Value Ref Range    AMPHETAMINES Negative Negative      BARBITURATES Negative Negative      BENZODIAZEPINES Negative Negative      COCAINE Negative Negative      METHADONE Negative Negative      OPIATES Negative Negative      PCP(PHENCYCLIDINE) Negative Negative      THC (TH-CANNABINOL) Negative Negative      Drug screen comment        This test is a screen for drugs of abuse in a medical setting only (i.e., they are unconfirmed results and as such must not be used for non-medical purposes, e.g.,employment testing, legal testing). Due to its inherent nature, false positive (FP) and false negative (FN) results may be obtained. Therefore, if necessary for medical care, recommend confirmation of positive findings by GC/MS.    GLUCOSE, POC    Collection Time: 08/04/21  5:10 PM   Result Value Ref Range    Glucose (POC) 96 65 - 117 mg/dL    Performed by Johnathon Solorzano    BLOOD GAS, ARTERIAL    Collection Time: 08/04/21  5:14 PM   Result Value Ref Range    pH 7.47 (H) 7.35 - 7.45      PCO2 31 (L) 35 - 45 mmHg    PO2 54 (L) 75 - 100 mmHg    O2 SAT 89 (L) >95 %    BICARBONATE 24 22 - 26 mmol/L    BASE DEFICIT 0.2 0 - 2 mmol/L    O2 METHOD VENT      FIO2 100.0 %    MODE Assist Control/Volume Control      Tidal volume 500      SET RATE 12      IPAP/PIP 0      EPAP/CPAP/PEEP 5.0      SITE Right Brachial      KRUNAL'S TEST PASS      Critical value read back DR Wendy Arias    URINALYSIS W/MICROSCOPIC    Collection Time: 08/04/21  6:41 PM   Result Value Ref Range    Color Yellow/Straw      Appearance Clear Clear      Specific gravity 1.026 1.003 - 1.030      pH (UA) 6.0 5.0 - 8.0      Protein 30 (A) Negative mg/dL    Glucose 50 (A) Negative mg/dL    Ketone 5 (A) Negative mg/dL    Bilirubin Negative Negative      Blood Negative Negative      Urobilinogen 4.0 (H) 0.1 - 1.0 EU/dL    Nitrites Negative Negative      Leukocyte Esterase Negative Negative      WBC 0-4 0 - 4 /hpf    RBC 0-5 0 - 5 /hpf    Bacteria Negative Negative /hpf    Mucus Trace /lpf   MRSA SCREEN - PCR (NASAL)    Collection Time: 08/04/21  6:41 PM   Result Value Ref Range    MRSA by PCR, Nasal Not Detected Not Detected     TSH 3RD GENERATION    Collection Time: 08/04/21  7:25 PM   Result Value Ref Range    TSH 0.90 0.36 - 3.74 uIU/mL   LACTIC ACID    Collection Time: 08/04/21  7:25 PM   Result Value Ref Range    Lactic acid 5.5 (HH) 0.4 - 2.0 mmol/L   BNP    Collection Time: 08/04/21  7:25 PM   Result Value Ref Range    NT pro- (H) <125 pg/mL   TROPONIN I    Collection Time: 08/04/21  7:25 PM   Result Value Ref Range    Troponin-I, Qt. 0.38 (H) <6.80 ng/mL   METABOLIC PANEL, COMPREHENSIVE    Collection Time: 08/05/21  1:02 AM   Result Value Ref Range    Sodium 137 136 - 145 mmol/L    Potassium 3.9 3.5 - 5.1 mmol/L    Chloride 108 97 - 108 mmol/L    CO2 19 (L) 21 - 32 mmol/L    Anion gap 10 5 - 15 mmol/L    Glucose 119 (H) 65 - 100 mg/dL    BUN 22 (H) 6 - 20 mg/dL    Creatinine 1.32 (H) 0.70 - 1.30 mg/dL BUN/Creatinine ratio 17 12 - 20      GFR est AA >60 >60 ml/min/1.73m2    GFR est non-AA 54 (L) >60 ml/min/1.73m2    Calcium 6.1 (LL) 8.5 - 10.1 mg/dL    Bilirubin, total 1.6 (H) 0.2 - 1.0 mg/dL    AST (SGOT) 84 (H) 15 - 37 U/L    ALT (SGPT) <6 (L) 12 - 78 U/L    Alk.  phosphatase 48 45 - 117 U/L    Protein, total 6.6 6.4 - 8.2 g/dL    Albumin 2.7 (L) 3.5 - 5.0 g/dL    Globulin 3.9 2.0 - 4.0 g/dL    A-G Ratio 0.7 (L) 1.1 - 2.2     RENAL FUNCTION PANEL    Collection Time: 08/05/21  1:02 AM   Result Value Ref Range    Sodium 136 136 - 145 mmol/L    Potassium 3.8 3.5 - 5.1 mmol/L    Chloride 107 97 - 108 mmol/L    CO2 23 21 - 32 mmol/L    Anion gap 6 5 - 15 mmol/L    Glucose 125 (H) 65 - 100 mg/dL    BUN 22 (H) 6 - 20 mg/dL    Creatinine 1.29 0.70 - 1.30 mg/dL    BUN/Creatinine ratio 17 12 - 20      GFR est AA >60 >60 ml/min/1.73m2    GFR est non-AA 56 (L) >60 ml/min/1.73m2    Calcium 8.3 (L) 8.5 - 10.1 mg/dL    Phosphorus 1.4 (L) 2.6 - 4.7 mg/dL    Albumin 2.7 (L) 3.5 - 5.0 g/dL   MAGNESIUM    Collection Time: 08/05/21  1:02 AM   Result Value Ref Range    Magnesium 1.9 1.6 - 2.4 mg/dL   TROPONIN I    Collection Time: 08/05/21  1:02 AM   Result Value Ref Range    Troponin-I, Qt. 1.38 (H) <0.05 ng/mL   AMMONIA    Collection Time: 08/05/21  1:02 AM   Result Value Ref Range    Ammonia 37 (H) <32 umol/L   BLOOD GAS, ARTERIAL    Collection Time: 08/05/21  4:00 AM   Result Value Ref Range    pH 7.45 7.35 - 7.45      PCO2 37 35 - 45 mmHg    PO2 134 (H) 75 - 100 mmHg    O2 SAT 98 >95 %    BICARBONATE 26 22 - 26 mmol/L    BASE EXCESS 1.8 0 - 2 mmol/L    O2 METHOD VENT      FIO2 80.0 %    MODE Assist Control/Volume Control      Tidal volume 500      SET RATE 12      IPAP/PIP 0      EPAP/CPAP/PEEP 5.0      SITE Right Radial      KRUNAL'S TEST PASS     CBC WITH AUTOMATED DIFF    Collection Time: 08/05/21  4:15 AM   Result Value Ref Range    WBC 13.3 (H) 4.1 - 11.1 K/uL    RBC 4.79 4.10 - 5.70 M/uL    HGB 14.5 12.1 - 17.0 g/dL    HCT 43.4 36.6 - 50.3 %    MCV 90.6 80.0 - 99.0 FL    MCH 30.3 26.0 - 34.0 PG    MCHC 33.4 30.0 - 36.5 g/dL    RDW 13.1 11.5 - 14.5 %    PLATELET 471 823 - 974 K/uL    MPV 11.9 8.9 - 12.9 FL    NRBC 0.0 0.0  WBC    ABSOLUTE NRBC 0.00 0.00 - 0.01 K/uL    NEUTROPHILS 66 32 - 75 %    BAND NEUTROPHILS 23 (H) 0 - 6 %    LYMPHOCYTES 5 (L) 12 - 49 %    MONOCYTES 6 5 - 13 %    EOSINOPHILS 0 0 - 7 %    BASOPHILS 0 0 - 1 %    IMMATURE GRANULOCYTES 0 %    ABS. NEUTROPHILS 11.8 (H) 1.8 - 8.0 K/UL    ABS. LYMPHOCYTES 0.7 (L) 0.8 - 3.5 K/UL    ABS. MONOCYTES 0.8 0.0 - 1.0 K/UL    ABS. EOSINOPHILS 0.0 0.0 - 0.4 K/UL    ABS. BASOPHILS 0.0 0.0 - 0.1 K/UL    ABS. IMM. GRANS. 0.0 K/UL    DF Manual      RBC COMMENTS Normocytic, Normochromic       [unfilled]      Review of Systems    Limited secondary to unresponsive nature of the patient. Physical Exam:      Constitutional: pt is unresponsive  HENT:   Head: Normocephalic and atraumatic. Eyes: Pupils are equal, round, and reactive to light. EOM are normal.   Cardiovascular: Normal rate, regular rhythm and normal heart sounds. Pulmonary/Chest: Breath sounds normal. No wheezes. No rales. Exhibits no tenderness. Abdominal: Soft. Bowel sounds are normal. There is no abdominal tenderness. There is no rebound and no guarding. Musculoskeletal: Normal range of motion. Neurological: pt postures (decerebrates) to mild pain and touch    XR CHEST PORT   Final Result      XR CHEST PORT   Final Result   ET tube placed, with tip 7 mm above the doretha. Asymmetric airspace   disease noted throughout the left lung, consistent with pneumonia, improved from   previous. Right lung clear. Stable normal heart size. Calcified aortic knob. XR CHEST PORT   Final Result   New diffuse airspace opacity in the left lung. MRI BRAIN WO CONT   Final Result   1.   Restricted diffusion involving the cerebellar hemispheres, nemesio, and   possibly the left midbrain most likely representing acute ischemic infarcts   although the brainstem involvement may be partially artifactual. Consider a   short-term interval follow-up MRI. 2.  Attenuated vertebrobasilar flow voids which may be related to underlying   stenosis. CTA or MRA is recommended for further evaluation. 3.  Extensive multifocal encephalomalacia as described above. Report called to the patient's ICU nurse, Rd Cabezas at 5:45 PM on   8/4/2021 via phone conversation. XR CHEST PORT   Final Result   The cardiomediastinal silhouette is appropriate for age, technique,   and lung expansion. Pulmonary vasculature is not congested. The lungs are   essentially clear. No effusion or pneumothorax is seen. CT CODE NEURO HEAD WO CONTRAST   Final Result      No acute intracranial bleed. Bilateral areas of encephalomalacia. Atherosclerosis. A noncontrast head CT does not exclude the possibility of an   acute ischemic stroke. MRI is more sensitive for evaluation of brain parenchyma. Clinical correlation is recommended. Follow-up as clinically indicated. The   results were called and verbally communicated to Dr. Holly Garcia on 8/4/2021 at   1:00 AM.      Dental disease. Please see full report.       DUPLEX CAROTID BILATERAL    (Results Pending)   XR CHEST PORT    (Results Pending)        Recent Results (from the past 24 hour(s))   CULTURE, BLOOD    Collection Time: 08/04/21 10:20 AM    Specimen: Blood   Result Value Ref Range    Special Requests: No Special Requests      Culture result: No growth after 19 hours     PROLACTIN    Collection Time: 08/04/21 10:20 AM   Result Value Ref Range    Prolactin 19.0 ng/mL   CULTURE, BLOOD    Collection Time: 08/04/21 10:25 AM    Specimen: Blood   Result Value Ref Range    Special Requests: No Special Requests      Culture result: No growth after 19 hours     URINALYSIS W/MICROSCOPIC    Collection Time: 08/04/21 11:00 AM   Result Value Ref Range    Color Yellow/Straw      Appearance Clear Clear      Specific gravity 1.025 1.003 - 1.030      pH (UA) 7.0 5.0 - 8.0      Protein 30 (A) Negative mg/dL    Glucose Negative Negative mg/dL    Ketone 5 (A) Negative mg/dL    Bilirubin Negative Negative      Blood Negative Negative      Urobilinogen 4.0 (H) 0.1 - 1.0 EU/dL    Nitrites Negative Negative      Leukocyte Esterase Negative Negative      WBC 0-5 0 - 4 /hpf    RBC 0-5 0 - 5 /hpf    Bacteria Negative Negative /hpf   DRUG SCREEN, URINE    Collection Time: 08/04/21 11:00 AM   Result Value Ref Range    AMPHETAMINES Negative Negative      BARBITURATES Negative Negative      BENZODIAZEPINES Negative Negative      COCAINE Negative Negative      METHADONE Negative Negative      OPIATES Negative Negative      PCP(PHENCYCLIDINE) Negative Negative      THC (TH-CANNABINOL) Negative Negative      Drug screen comment        This test is a screen for drugs of abuse in a medical setting only (i.e., they are unconfirmed results and as such must not be used for non-medical purposes, e.g.,employment testing, legal testing). Due to its inherent nature, false positive (FP) and false negative (FN) results may be obtained. Therefore, if necessary for medical care, recommend confirmation of positive findings by GC/MS. DRUG SCREEN, URINE    Collection Time: 08/04/21 11:00 AM   Result Value Ref Range    AMPHETAMINES Negative Negative      BARBITURATES Negative Negative      BENZODIAZEPINES Negative Negative      COCAINE Negative Negative      METHADONE Negative Negative      OPIATES Negative Negative      PCP(PHENCYCLIDINE) Negative Negative      THC (TH-CANNABINOL) Negative Negative      Drug screen comment        This test is a screen for drugs of abuse in a medical setting only (i.e., they are unconfirmed results and as such must not be used for non-medical purposes, e.g.,employment testing, legal testing). Due to its inherent nature, false positive (FP) and false negative (FN) results may be obtained.  Therefore, if necessary for medical care, recommend confirmation of positive findings by GC/MS.    GLUCOSE, POC    Collection Time: 08/04/21  5:10 PM   Result Value Ref Range    Glucose (POC) 96 65 - 117 mg/dL    Performed by Dickson Lorenzana    BLOOD GAS, ARTERIAL    Collection Time: 08/04/21  5:14 PM   Result Value Ref Range    pH 7.47 (H) 7.35 - 7.45      PCO2 31 (L) 35 - 45 mmHg    PO2 54 (L) 75 - 100 mmHg    O2 SAT 89 (L) >95 %    BICARBONATE 24 22 - 26 mmol/L    BASE DEFICIT 0.2 0 - 2 mmol/L    O2 METHOD VENT      FIO2 100.0 %    MODE Assist Control/Volume Control      Tidal volume 500      SET RATE 12      IPAP/PIP 0      EPAP/CPAP/PEEP 5.0      SITE Right Brachial      KRUNAL'S TEST PASS      Critical value read back DR NIDHI Newman W/MICROSCOPIC    Collection Time: 08/04/21  6:41 PM   Result Value Ref Range    Color Yellow/Straw      Appearance Clear Clear      Specific gravity 1.026 1.003 - 1.030      pH (UA) 6.0 5.0 - 8.0      Protein 30 (A) Negative mg/dL    Glucose 50 (A) Negative mg/dL    Ketone 5 (A) Negative mg/dL    Bilirubin Negative Negative      Blood Negative Negative      Urobilinogen 4.0 (H) 0.1 - 1.0 EU/dL    Nitrites Negative Negative      Leukocyte Esterase Negative Negative      WBC 0-4 0 - 4 /hpf    RBC 0-5 0 - 5 /hpf    Bacteria Negative Negative /hpf    Mucus Trace /lpf   MRSA SCREEN - PCR (NASAL)    Collection Time: 08/04/21  6:41 PM   Result Value Ref Range    MRSA by PCR, Nasal Not Detected Not Detected     TSH 3RD GENERATION    Collection Time: 08/04/21  7:25 PM   Result Value Ref Range    TSH 0.90 0.36 - 3.74 uIU/mL   LACTIC ACID    Collection Time: 08/04/21  7:25 PM   Result Value Ref Range    Lactic acid 5.5 (HH) 0.4 - 2.0 mmol/L   BNP    Collection Time: 08/04/21  7:25 PM   Result Value Ref Range    NT pro- (H) <125 pg/mL   TROPONIN I    Collection Time: 08/04/21  7:25 PM   Result Value Ref Range    Troponin-I, Qt. 0.38 (H) <2.10 ng/mL   METABOLIC PANEL, COMPREHENSIVE Collection Time: 08/05/21  1:02 AM   Result Value Ref Range    Sodium 137 136 - 145 mmol/L    Potassium 3.9 3.5 - 5.1 mmol/L    Chloride 108 97 - 108 mmol/L    CO2 19 (L) 21 - 32 mmol/L    Anion gap 10 5 - 15 mmol/L    Glucose 119 (H) 65 - 100 mg/dL    BUN 22 (H) 6 - 20 mg/dL    Creatinine 1.32 (H) 0.70 - 1.30 mg/dL    BUN/Creatinine ratio 17 12 - 20      GFR est AA >60 >60 ml/min/1.73m2    GFR est non-AA 54 (L) >60 ml/min/1.73m2    Calcium 6.1 (LL) 8.5 - 10.1 mg/dL    Bilirubin, total 1.6 (H) 0.2 - 1.0 mg/dL    AST (SGOT) 84 (H) 15 - 37 U/L    ALT (SGPT) <6 (L) 12 - 78 U/L    Alk.  phosphatase 48 45 - 117 U/L    Protein, total 6.6 6.4 - 8.2 g/dL    Albumin 2.7 (L) 3.5 - 5.0 g/dL    Globulin 3.9 2.0 - 4.0 g/dL    A-G Ratio 0.7 (L) 1.1 - 2.2     RENAL FUNCTION PANEL    Collection Time: 08/05/21  1:02 AM   Result Value Ref Range    Sodium 136 136 - 145 mmol/L    Potassium 3.8 3.5 - 5.1 mmol/L    Chloride 107 97 - 108 mmol/L    CO2 23 21 - 32 mmol/L    Anion gap 6 5 - 15 mmol/L    Glucose 125 (H) 65 - 100 mg/dL    BUN 22 (H) 6 - 20 mg/dL    Creatinine 1.29 0.70 - 1.30 mg/dL    BUN/Creatinine ratio 17 12 - 20      GFR est AA >60 >60 ml/min/1.73m2    GFR est non-AA 56 (L) >60 ml/min/1.73m2    Calcium 8.3 (L) 8.5 - 10.1 mg/dL    Phosphorus 1.4 (L) 2.6 - 4.7 mg/dL    Albumin 2.7 (L) 3.5 - 5.0 g/dL   MAGNESIUM    Collection Time: 08/05/21  1:02 AM   Result Value Ref Range    Magnesium 1.9 1.6 - 2.4 mg/dL   TROPONIN I    Collection Time: 08/05/21  1:02 AM   Result Value Ref Range    Troponin-I, Qt. 1.38 (H) <0.05 ng/mL   AMMONIA    Collection Time: 08/05/21  1:02 AM   Result Value Ref Range    Ammonia 37 (H) <32 umol/L   BLOOD GAS, ARTERIAL    Collection Time: 08/05/21  4:00 AM   Result Value Ref Range    pH 7.45 7.35 - 7.45      PCO2 37 35 - 45 mmHg    PO2 134 (H) 75 - 100 mmHg    O2 SAT 98 >95 %    BICARBONATE 26 22 - 26 mmol/L    BASE EXCESS 1.8 0 - 2 mmol/L    O2 METHOD VENT      FIO2 80.0 %    MODE Assist Control/Volume Control      Tidal volume 500      SET RATE 12      IPAP/PIP 0      EPAP/CPAP/PEEP 5.0      SITE Right Radial      KRUNAL'S TEST PASS     CBC WITH AUTOMATED DIFF    Collection Time: 08/05/21  4:15 AM   Result Value Ref Range    WBC 13.3 (H) 4.1 - 11.1 K/uL    RBC 4.79 4.10 - 5.70 M/uL    HGB 14.5 12.1 - 17.0 g/dL    HCT 43.4 36.6 - 50.3 %    MCV 90.6 80.0 - 99.0 FL    MCH 30.3 26.0 - 34.0 PG    MCHC 33.4 30.0 - 36.5 g/dL    RDW 13.1 11.5 - 14.5 %    PLATELET 393 616 - 770 K/uL    MPV 11.9 8.9 - 12.9 FL    NRBC 0.0 0.0  WBC    ABSOLUTE NRBC 0.00 0.00 - 0.01 K/uL    NEUTROPHILS 66 32 - 75 %    BAND NEUTROPHILS 23 (H) 0 - 6 %    LYMPHOCYTES 5 (L) 12 - 49 %    MONOCYTES 6 5 - 13 %    EOSINOPHILS 0 0 - 7 %    BASOPHILS 0 0 - 1 %    IMMATURE GRANULOCYTES 0 %    ABS. NEUTROPHILS 11.8 (H) 1.8 - 8.0 K/UL    ABS. LYMPHOCYTES 0.7 (L) 0.8 - 3.5 K/UL    ABS. MONOCYTES 0.8 0.0 - 1.0 K/UL    ABS. EOSINOPHILS 0.0 0.0 - 0.4 K/UL    ABS. BASOPHILS 0.0 0.0 - 0.1 K/UL    ABS. IMM.  GRANS. 0.0 K/UL    DF Manual      RBC COMMENTS Normocytic, Normochromic         Results     Procedure Component Value Units Date/Time    CULTURE, BLOOD, LINE [[id number]] Collected: 08/05/21 0102    Order Status: Completed Specimen: Blood Updated: 08/05/21 0118    CULTURE, RESPIRATORY/SPUTUM/BRONCH Lupe Falgunihoff [[id number]] Collected: 08/04/21 2230    Order Status: Completed Specimen: Sputum Updated: 08/04/21 2233    CULTURE, URINE [[id number]] Collected: 08/04/21 1841    Order Status: Completed Specimen: Urine Updated: 08/04/21 2159    MRSA SCREEN - PCR (NASAL) [[id number]] Collected: 08/04/21 1841    Order Status: Completed Specimen: Swab Updated: 08/05/21 0027     MRSA by PCR, Nasal Not Detected       CULTURE, URINE [[id number]] Collected: 08/04/21 1100    Order Status: Completed Specimen: Urine Updated: 08/04/21 1114    CULTURE, BLOOD #2 [[id number]] Collected: 08/04/21 1025    Order Status: Completed Specimen: Blood Updated: 08/05/21 0754     Special Requests: No Special Requests        Culture result: No growth after 19 hours       CULTURE, BLOOD #1 [[id number]] Collected: 08/04/21 1020    Order Status: Completed Specimen: Blood Updated: 08/05/21 0754     Special Requests: No Special Requests        Culture result: No growth after 19 hours              Labs:     Recent Labs     08/05/21  0415 08/04/21  0130   WBC 13.3* 12.5*   HGB 14.5 13.9   HCT 43.4 43.1    173     Recent Labs     08/05/21  0102 08/04/21  0130     136 137   K 3.9  3.8 3.8     107 104   CO2 19*  23 29   BUN 22*  22* 24*   CREA 1.32*  1.29 0.78   *  125* 99   CA 6.1*  8.3* 8.6   MG 1.9  --    PHOS 1.4*  --      Recent Labs     08/05/21  0102 08/04/21  0130   ALT <6* 33   AP 48 70   TBILI 1.6* 0.5   TP 6.6 7.9   ALB 2.7*  2.7* 3.4*   GLOB 3.9 4.5*     Recent Labs     08/04/21  0440   INR 1.1   PTP 14.0   APTT 24.7      No results for input(s): FE, TIBC, PSAT, FERR in the last 72 hours.    No results found for: FOL, RBCF   Recent Labs     08/05/21  0400 08/04/21  1714   PH 7.45 7.47*   PCO2 37 31*   PO2 134* 54*     Recent Labs     08/05/21  0102 08/04/21  1925 08/04/21  0130   TROIQ 1.38* 0.38* <0.05     No results found for: CHOL, CHOLX, CHLST, CHOLV, HDL, HDLP, LDL, LDLC, DLDLP, TGLX, TRIGL, TRIGP, CHHD, CHHDX  Lab Results   Component Value Date/Time    Glucose (POC) 96 08/04/2021 05:10 PM    Glucose (POC) 122 (H) 08/04/2021 08:15 AM    Glucose (POC) 104 08/04/2021 12:53 AM     Lab Results   Component Value Date/Time    Color Yellow/Straw 08/04/2021 06:41 PM    Appearance Clear 08/04/2021 06:41 PM    Specific gravity 1.026 08/04/2021 06:41 PM    pH (UA) 6.0 08/04/2021 06:41 PM    Protein 30 (A) 08/04/2021 06:41 PM    Glucose 50 (A) 08/04/2021 06:41 PM    Ketone 5 (A) 08/04/2021 06:41 PM    Bilirubin Negative 08/04/2021 06:41 PM    Urobilinogen 4.0 (H) 08/04/2021 06:41 PM    Nitrites Negative 08/04/2021 06:41 PM    Leukocyte Esterase Negative 08/04/2021 06:41 PM Bacteria Negative 08/04/2021 06:41 PM    WBC 0-4 08/04/2021 06:41 PM    RBC 0-5 08/04/2021 06:41 PM         Assessment:   Brainstem and cerebellar infarct  Cardiac arrest, possibly secondary to acute hypoxemic respiratory failure  Aspiration pneumonia  Sepsis  History of CVA  History of seizures           Plan:     Patient admitted to telemetry floor  Neurology consult  2D echo results pending  Carotid Dopplers  IV fluids  Awaiting sputum and urine cultures, but negative blood cultures  Neuro check  Seizures precaution        Current Facility-Administered Medications:     potassium, sodium phosphates (NEUTRA-PHOS) packet 2 Packet, 2 Packet, Oral, Q4H, Lauren Jj MD    levETIRAcetam (KEPPRA) 500 mg in saline (iso-osm) 100 mL IVPB (premix), 500 mg, IntraVENous, Q12H, Lupis Taylor MD, 500 mg at 08/05/21 0610    metoclopramide HCl (REGLAN) injection 10 mg, 10 mg, IntraVENous, Q4H PRN, Lupis Taylor MD    0.9% sodium chloride infusion, 100 mL/hr, IntraVENous, CONTINUOUS, Lupis Taylor MD, Last Rate: 100 mL/hr at 08/04/21 0855, 100 mL/hr at 08/04/21 0855    acetaminophen (TYLENOL) tablet 650 mg, 650 mg, Oral, Q6H PRN **OR** acetaminophen (TYLENOL) suppository 650 mg, 650 mg, Rectal, Q6H PRN, Lupis Taylor MD    polyethylene glycol (MIRALAX) packet 17 g, 17 g, Oral, DAILY PRN, Parth Taylor MD    ondansetron (ZOFRAN ODT) tablet 4 mg, 4 mg, Oral, Q8H PRN **OR** ondansetron (ZOFRAN) injection 4 mg, 4 mg, IntraVENous, Q6H PRN, Lupis Taylor MD    enoxaparin (LOVENOX) injection 40 mg, 40 mg, SubCUTAneous, DAILY, Lupis Taylor MD, 40 mg at 08/05/21 0800    aspirin tablet 325 mg, 325 mg, Oral, DAILY, Lupis Taylor MD, 325 mg at 08/05/21 0800    atorvastatin (LIPITOR) tablet 40 mg, 40 mg, Oral, QHS, Lupis Taylor MD, 40 mg at 08/04/21 2157    piperacillin-tazobactam (ZOSYN) 3.375 g in 0.9% sodium chloride (MBP/ADV) 100 mL MBP, 3.375 g, IntraVENous, Q8H, Lupis Taylor, MD, Last Rate: 200 mL/hr at 08/05/21 0402, 3.375 g at 08/05/21 0402    propofol (DIPRIVAN) 10 mg/mL infusion, 0-50 mcg/kg/min, IntraVENous, TITRATE, Lissette Flores MD, Last Rate: 3.9 mL/hr at 08/04/21 1800, 10 mcg/kg/min at 08/04/21 1800    dexamethasone (DECADRON) 4 mg/mL injection 4 mg, 4 mg, IntraVENous, Q6H, Lissette Flores MD, 4 mg at 08/05/21 0610    NOREPINephrine (LEVOPHED) 8 mg in 5% dextrose 250mL (32 mcg/mL) infusion, 10 mcg/min, IntraVENous, TITRATE, Lupis Taylor MD, Last Rate: 9.4 mL/hr at 08/05/21 0755, 5 mcg/min at 08/05/21 9913                                                                                              *ATTENTION:  This note has been created by a medical student for educational purposes only. Please do not refer to the content of this note for clinical decision-making, billing, or other purposes. Please see attending physicians note to obtain clinical information on this patient. *

## 2021-08-05 NOTE — WOUND CARE
Discussed care with ARUN Phelan RN. Pt unstable at this time. No direct WCN evaluation. Per nurse, No areas of skin breakdown at this time. Eliud 13. Skin overall flaky and dry. Pt intubated. Currently on Progressa with low air loss. Zinc paste in use. Discussed use of quick change to help elevate edematous scrotum and help manage moisture. Continue with strict good turn schedule, moisture management and positioning to relieve pressure. Please do not hesitate to consult WCN with skin concerns.

## 2021-08-05 NOTE — PROGRESS NOTES
Consult  Pulmonary, Critical Care    Name: Nacho León MRN: [id number]   : 1955 Hospital: [facility]   Date: 2021  Admission date: 2021 Hospital Day: 2       Subjective/Interval History:   Patient admitted last night with altered mental status has remained unresponsive today. Some notes mention that he had vomiting at home before admission. His admission chest x-ray was relatively clear. He did go to MRI today came back to the floor Dr. Claudia Alonso saw him at that time he had agonal respirations and anesthesia was called to intubate him. Chest x-ray shows extensive new left-sided infiltrate he is currently on the ventilator has been transferred to the ICU. Dr. Claudia Alonso asked for me to see him for vent management    Patient Active Problem List   Diagnosis Code    Altered mental status R41.82    Dysarthria R47.1    AMS (altered mental status) R41.82       IMPRESSION:   1. Acute hypoxic respiratory failure  2. Aspiration pneumonia extensive left lung  3. Hypotension questionably sepsis currently on norepinephrine 5 mics  4. Extensive acute CVA  cerebellar and brainstem seen on MRI  5. Acute kidney injury  6. Elevated troponin probably acute myocardial infarct from anoxia  7. Hypophosphatemia  8. Hypocalcemia  9. History seizure disorder  10. History intracranial aneurysm repair years ago  6. Chronic left hemiparesis  Body mass index is 24.13 kg/m². 12.       RECOMMENDATIONS/PLAN:   1. We will adjust ventilator as blood gases dictate  2. Continue IV Zosyn  3. No wheezing left lung reinflated will discontinue nebulized Mucomyst  4.   5. Continue IV fluids  6.   7.          Subjective/Initial History:       I was asked by Mak Carey MD to see Nacho León a 76 y.o.    male  in consultation for a chief complaint of acute hypoxic respiratory failure aspiration pneumonia      The patient is unable to give any meaningful history or review of systems due to patient factors. Pt now in CCU. Patient PCP: None  PMH:  has a past medical history of CVA (cerebral vascular accident) ([facility] [address] and Seizure ([facility] [address] PSH:   has no past surgical history on file. FHX: family history is not on file. SHX:  reports that he has quit smoking. He has never used smokeless tobacco. He reports previous alcohol use. He reports that he does not use drugs.     Systemic review unobtainable as patient is unresponsive on the ventilator    No Known Allergies   MEDS:   Current Facility-Administered Medications   Medication    levETIRAcetam (KEPPRA) 500 mg in saline (iso-osm) 100 mL IVPB (premix)    metoclopramide HCl (REGLAN) injection 10 mg    0.9% sodium chloride infusion    acetaminophen (TYLENOL) tablet 650 mg    Or    acetaminophen (TYLENOL) suppository 650 mg    polyethylene glycol (MIRALAX) packet 17 g    ondansetron (ZOFRAN ODT) tablet 4 mg    Or    ondansetron (ZOFRAN) injection 4 mg    enoxaparin (LOVENOX) injection 40 mg    aspirin tablet 325 mg    atorvastatin (LIPITOR) tablet 40 mg    piperacillin-tazobactam (ZOSYN) 3.375 g in 0.9% sodium chloride (MBP/ADV) 100 mL MBP    propofol (DIPRIVAN) 10 mg/mL infusion    dexamethasone (DECADRON) 4 mg/mL injection 4 mg    NOREPINephrine (LEVOPHED) 8 mg in 5% dextrose 250mL (32 mcg/mL) infusion        Current Facility-Administered Medications:     levETIRAcetam (KEPPRA) 500 mg in saline (iso-osm) 100 mL IVPB (premix), 500 mg, IntraVENous, Q12H, Lupis Taylor MD, 500 mg at 08/05/21 0610    metoclopramide HCl (REGLAN) injection 10 mg, 10 mg, IntraVENous, Q4H PRN, Lupis Taylor MD    0.9% sodium chloride infusion, 100 mL/hr, IntraVENous, CONTINUOUS, Lupis Taylor MD, Last Rate: 100 mL/hr at 08/04/21 0855, 100 mL/hr at 08/04/21 0855    acetaminophen (TYLENOL) tablet 650 mg, 650 mg, Oral, Q6H PRN **OR** acetaminophen (TYLENOL) suppository 650 mg, 650 mg, Rectal, Q6H PRN, Juanis Taylor MD  [facility] polyethylene glycol (MIRALAX) packet 17 g, 17 g, Oral, DAILY PRN, Caesar Taylor MD    ondansetron (ZOFRAN ODT) tablet 4 mg, 4 mg, Oral, Q8H PRN **OR** ondansetron (ZOFRAN) injection 4 mg, 4 mg, IntraVENous, Q6H PRN, Lupis Taylor MD    enoxaparin (LOVENOX) injection 40 mg, 40 mg, SubCUTAneous, DAILY, Lupis Taylor MD, 40 mg at 21 0800    aspirin tablet 325 mg, 325 mg, Oral, DAILY, Lupis Taylor MD, 325 mg at 21 0800    atorvastatin (LIPITOR) tablet 40 mg, 40 mg, Oral, QHS, Lupis Taylor MD, 40 mg at 21 2157    piperacillin-tazobactam (ZOSYN) 3.375 g in 0.9% sodium chloride (MBP/ADV) 100 mL MBP, 3.375 g, IntraVENous, Q8H, Lupis Taylor MD, Last Rate: 200 mL/hr at 21 0402, 3.375 g at 21 0402    propofol (DIPRIVAN) 10 mg/mL infusion, 0-50 mcg/kg/min, IntraVENous, TITRATE, Sammy Baugh MD, Last Rate: 3.9 mL/hr at 21 1800, 10 mcg/kg/min at 21 1800    dexamethasone (DECADRON) 4 mg/mL injection 4 mg, 4 mg, IntraVENous, Q6H, Sammy Baugh MD, 4 mg at 21 0610    NOREPINephrine (LEVOPHED) 8 mg in 5% dextrose 250mL (32 mcg/mL) infusion, 10 mcg/min, IntraVENous, TITRATE, Lupis Taylor MD, Last Rate: 9.4 mL/hr at 21 0755, 5 mcg/min at 21 0755      Objective:     Vital Signs: Telemetry:    normal sinus rhythm Intake/Output:   Visit Vitals  /80 (BP 1 Location: Left upper arm, BP Patient Position: At rest)   Pulse (!) 113   Temp 99.9 °F (37.7 °C)   Resp 20   Ht 5' 5\" (1.651 m)   Wt 65.8 kg (145 lb)   SpO2 100%   BMI 24.13 kg/m²       Temp (24hrs), Av.4 °F (36.9 °C), Min:97.5 °F (36.4 °C), Max:99.9 °F (37.7 °C)        O2 Device: Ventilator           Body mass index is 24.13 kg/m².     Wt Readings from Last 4 Encounters:   21 65.8 kg (145 lb)          Intake/Output Summary (Last 24 hours) at 2021 0855  Last data filed at 2021 0529  Gross per 24 hour   Intake    Output 425 ml   Net -425 ml       Last shift: No intake/output data recorded. Last 3 shifts: 08/03 1901 - 08/05 0700  In: -   Out: 425 [Urine:425]       Hemodynamics:    CO:    CI:    CVP:    SVR:   PAP Systolic:    PAP Diastolic:    PVR:    RH70:       Ventilator Settings:      Mode Rate TV Press PEEP FiO2 PIP Min. Vent   Assist control, Volume control    500 ml    5 cm H20 70 %  23 cm H2O  6.41 l/min      Physical Exam:    General:  male; unresponsive on the ventilator   HEENT: NCAT, ET tube in place  Eyes: anicteric; conjunctiva clear no doll's eye reflex  Neck: no nodes, no cuff leak, no accessory MM use. No JVD  Chest: no deformity,   Cardiac: Regular rate and rhythm  Lungs: Relatively clear right anterior lateral and posterior today has better breath sounds over the left chest with posterior rales and a few rhonchi or wheezing  Abd: Soft a few bowel sounds no grimacing to palpation  Ext: no edema; no joint swelling; No clubbing  : clear urine  Neuro:  On the ventilator on propofol unresponsive no doll's eye reflex flaccid extremities  Psych-  unable to assess  Skin: warm, dry, no cyanosis;   Pulses: Brachial and radial pulses intact  Capillary: Normal capillary refill      Labs:    Recent Labs     08/05/21 0415 08/04/21 0440 08/04/21  0130   WBC 13.3*  --  12.5*   HGB 14.5  --  13.9     --  173   INR  --  1.1  --    APTT  --  24.7  --      Recent Labs     08/05/21 0102 08/04/21 1925 08/04/21  0130     136  --  137   K 3.9  3.8  --  3.8     107  --  104   CO2 19*  23  --  29   *  125*  --  99   BUN 22*  22*  --  24*   CREA 1.32*  1.29  --  0.78   CA 6.1*  8.3*  --  8.6   MG 1.9  --   --    PHOS 1.4*  --   --    LAC  --  5.5*  --    ALB 2.7*  2.7*  --  3.4*   ALT <6*  --  33       Recent Labs     08/05/21 0102 08/04/21 1925 08/04/21  0130   TROIQ 1.38* 0.38* <0.05       Lab Results   Component Value Date/Time    Culture result: No growth after 19 hours 08/04/2021 10:25 AM    Culture result: No growth after 19 hours 08/04/2021 10:20 AM        Imaging:  I have personally reviewed the patients radiographs and have reviewed the reports:    CXR Results  (Last 48 hours)  8/4 chest x-ray after intubation shows ET tube a little low left lung reinflated extensive infiltrate persist  8/4 radiology has called MRI or results as acute cerebellar and brainstem infarct               08/04/21 1710  XR CHEST PORT Final result    Impression:  New diffuse airspace opacity in the left lung. Narrative:  Chest, frontal view, 8/4/2021       History: Shortness of breath. Comparison: Chest, same day. Findings: The cardiac silhouette is stable. The right lung is adequately   expanded and clear. The left lung has diffuse airspace opacity, new as compared   to study performed earlier the same day. Differential considerations include   aspiration, collapse secondary to mucous plugging. Clinical correlation is   recommended. Left pleural effusion is not excluded. No pneumothorax is   identified. Degenerative changes are present in the thoracic spine. 08/04/21 0853  XR CHEST PORT Final result    Impression:  The cardiomediastinal silhouette is appropriate for age, technique,   and lung expansion. Pulmonary vasculature is not congested. The lungs are   essentially clear. No effusion or pneumothorax is seen. Narrative:  1 view               Results from Hospital Encounter encounter on 08/04/21    XR CHEST PORT    Narrative  1 view comparison yesterday    NG tube is coiled in the upper stomach. ET tube remains    Little change in pronounced diffuse airspace disease on the left. Right lung  remains clear. No effusion or pneumothorax. Normal heart and mediastinum      XR CHEST PORT    Narrative  Portable chest, 1752 hours. Comparison with 8/4/2021 at 1705 hours. Impression  ET tube placed, with tip 7 mm above the doretha.  Asymmetric airspace  disease noted throughout the left lung, consistent with pneumonia, improved from  previous. Right lung clear. Stable normal heart size. Calcified aortic knob. XR CHEST PORT    Narrative  Chest, frontal view, 8/4/2021    History: Shortness of breath. Comparison: Chest, same day. Findings: The cardiac silhouette is stable. The right lung is adequately  expanded and clear. The left lung has diffuse airspace opacity, new as compared  to study performed earlier the same day. Differential considerations include  aspiration, collapse secondary to mucous plugging. Clinical correlation is  recommended. Left pleural effusion is not excluded. No pneumothorax is  identified. Degenerative changes are present in the thoracic spine. Impression  New diffuse airspace opacity in the left lung. Results from [place] encounter on 08/04/21    CT CODE NEURO HEAD WO CONTRAST    Narrative  CT head without contrast    Dose reduction: All CT scans at this facility are performed using dose reduction  optimization techniques as appropriate to a performed exam including the  following: Automated exposure control, adjustments of the mA and/or kV according  to patient size, or use of iterative reconstruction technique. INDICATION: Code neuro    FINDINGS:    No acute intracranial bleed or midline shift. Areas of encephalomalacia in the  right hemisphere and left frontal lobe. Probable white matter small vessel  ischemic changes. Mild ex vacuo dilatation of right lateral ventricle. MRI is  more sensitive for evaluation of brain parenchyma. Atherosclerosis. No acute  skull fracture. Postoperative changes right skull. No fluid in the paranasal  sinuses or mastoid air cells. Caries and periapical infection/abscess involving  multiple teeth. Impression  No acute intracranial bleed. Bilateral areas of encephalomalacia. Atherosclerosis. A noncontrast head CT does not exclude the possibility of an  acute ischemic stroke.  MRI is more sensitive for evaluation of brain parenchyma. Clinical correlation is recommended. Follow-up as clinically indicated. The  results were called and verbally communicated to Dr. Angelique Carrel on 8/4/2021 at  1:00 AM.    Dental disease. Please see full report. Discussion aspiration pneumonia with acute respiratory failure secondary to vomiting and brainstem and cerebellar infarct. Can add Decadron to try and prevent edema but it is not likely to help. 8/5 remains unresponsive does have some generalized movement when sternal rub. Renal function has deteriorated questionable ATN. Chest x-ray shows extensive infiltrate from aspiration. Troponin has elevated. MRI results as mentioned with new acute cerebellar infarcts and probable brainstem as well recovery seems unlikely  Time of care 30 minutes           Thank you for allowing us to participate in the care of this patient.   We will follow along with you    Barney Byrd MD

## 2021-08-05 NOTE — PROGRESS NOTES
Comprehensive Nutrition Assessment    Type and Reason for Visit: Initial (Intubation)    Nutrition Recommendations/Plan:   Initiate TF via OG of Osmolite 1.2 at 20mL/hr    Increase by 20mL q4hr to goal rate at 63mL/hr, continuous  Flush with 125mL free water q4hr  Goal feeds provide 1814kcal, 84g protein, 1990mL fluid (>/= 95%kcal +pro, 101%fluid)         Propofol (10mcg/kg/min) providing 104kcal/day (~100% kcal needs with TF)    Document TF rate, water flushes, and GRVs in EMR    Nutrition Assessment:  Unresponsive, emesis pta. CT head without acute findings. MRI (8/4) suggestive of L acute ischemic infarcts. Plans for EEG. Rapid response s/p MRI, intubated and transferred to ICU. CXR fining L sided infiltrate, likely aspiration. Remains intubated, sedated. Previously on 1x pressor, now d/c. Labs: BUN 22, Cr 1.32, , Ca 6.1, Phos (8/5) 1.4. Meds: Norepi-held, Propofol at 10mcg/kg/min, zosyn, dexamethasone, statin, keppra, KPhos. Malnutrition Assessment:  Malnutrition Status:  Mild malnutrition    Context:  Acute illness     Findings of the 6 clinical characteristics of malnutrition:   Energy Intake:  Mild decrease in energy intake (specify) (NPO x1)  Weight Loss:  Unable to assess     Body Fat Loss:  No significant body fat loss,     Muscle Mass Loss:  1 - Mild muscle mass loss, Temples (temporalis)  Fluid Accumulation:  No significant fluid accumulation,      Estimated Daily Nutrient Needs:  Energy (kcal): 1900kcal (PSU 2003b); Weight Used for Energy Requirements: Current  Protein (g): 86g (1.3g/kg); Weight Used for Protein Requirements: Current  Fluid (ml/day): 1900mL; Method Used for Fluid Requirements: 1 ml/kcal    Nutrition Related Findings:  Limited NFPE finding mild muscle wasting. No noted hx dysphagia, however possible 2/2 stroke and L hemiparesis- noted son feeds patient. 1x episode of emesis pta, no further. No noted c/d, last BM 8/5.       Wounds:    None       Current Nutrition Therapies:  DIET NPO  ADULT TUBE FEEDING Nasogastric; Other Tube Feeding (Specify); Promote; Delivery Method: Continuous; Continuous Initial Rate (mL/hr): 30; Continuous Advance Tube Feeding: No; Water Flush Volume (mL): 200; Water Flush Frequency: Q 4 hours    Anthropometric Measures:  · Height:  5' 5\" (165.1 cm)  · Current Body Wt:  65.8 kg (145 lb 1 oz) (8/4)   · Ideal Body Wt:  136 lbs:  106.7 %   · BMI Category:  Normal weight (BMI 18.5-24. 9)    Wt Readings from Last 1 Encounters:   08/04/21 65.8 kg (145 lb)     Nutrition Diagnosis:   · Inadequate oral intake related to impaired respiratory function as evidenced by intubation    Nutrition Interventions:   Food and/or Nutrient Delivery: Continue NPO, Start tube feeding  Nutrition Education and Counseling: No recommendations at this time  Coordination of Nutrition Care: Continue to monitor while inpatient    Goals:  Meet >75% EENs in 5-7 days, Maintain skin integrity, Lytes wnl       Nutrition Monitoring and Evaluation:   Behavioral-Environmental Outcomes: None identified  Food/Nutrient Intake Outcomes: Enteral nutrition intake/tolerance, Diet advancement/tolerance  Physical Signs/Symptoms Outcomes: Weight, Skin    Discharge Planning:     Too soon to determine     Electronically signed by Marica Galeazzi on 8/6/2021 at 3:39 PM    Contact:

## 2021-08-05 NOTE — PROGRESS NOTES
Progress Note    Patient: Obed Winter MRN: [id number]  SSN: [id number]    YOB: 1955  Age: 76 y.o. Sex: male      Admit Date: 8/4/2021    LOS: 1 day     Subjective:     No acute events overnight. Remains intubated. He is currently posturing. Objective:     Vitals:    08/05/21 0900 08/05/21 0942 08/05/21 1000 08/05/21 1100   BP: 115/88  128/85 (!) 152/86   Pulse: (!) 110 (!) 112 (!) 112 (!) 118   Resp: 17 12 13 12   Temp:    99.4 °F (37.4 °C)   SpO2: 100% 100% 100% 100%   Weight:       Height:            Intake and Output:  Current Shift: No intake/output data recorded. Last three shifts: 08/03 1901 - 08/05 0700  In: -   Out: 425 [Urine:425]    Physical Exam:   General:   Intubated. Eyes:  Conjunctivae/corneas clear. PERRL, EOMs intact. Fundi benign   Ears:  Normal TMs and external ear canals both ears. Nose: Nares normal. Septum midline. Mucosa normal. No drainage or sinus tenderness. Mouth/Throat: Lips, mucosa, and tongue normal. Teeth and gums normal.   Neck: Supple, symmetrical, trachea midline, no adenopathy, thyroid: no enlargment/tenderness/nodules, no carotid bruit and no JVD. Back:   Symmetric, no curvature. ROM normal. No CVA tenderness. Lungs:   Clear to auscultation bilaterally. Heart:  Regular rate and rhythm, S1, S2 normal, no murmur, click, rub or gallop. Abdomen:   Soft, non-tender. Bowel sounds normal. No masses,  No organomegaly. Extremities: Extremities normal, atraumatic, no cyanosis or edema. Pulses: 2+ and symmetric all extremities. Skin: Skin color, texture, turgor normal. No rashes or lesions   Lymph nodes: Cervical, supraclavicular, and axillary nodes normal.   Neurologic:  Intubated. Lab/Data Review: All lab results for the last 24 hours reviewed.          Assessment:     Active Problems:    Altered mental status (8/4/2021)      Dysarthria (8/4/2021)      AMS (altered mental status) (8/4/2021)         Patient is a 69-year-old -American male with:  1. Cardiac arrest, possibly secondary to acute hypoxemic respiratory failure  2. Aspiration pneumonia  3. Sepsis  4. Seizure disorder  5. History of intracranial aneurysm repair left hemiparesis  6. History of hypertension  7. Acute hypoxemic respiratory failure     Plan:     Currently in sinus rhythm. He was seen by neurology who think prognosis is very bad. His echo showed overall normal LV systolic function with grade 1 diastolic dysfunction. RV function was normal.  Kidney function is getting worse. Troponin went up to 1.38 which I still believe is type II non-STEMI due to hypoxemic respiratory failure. We will recheck troponin in a.m. Continue supportive care. No further cardiac testing planned at this time as will not change my management.     Signed By: Jonel Sampson MD     August 5, 2021

## 2021-08-05 NOTE — CONSULTS
Consult    Patient: Blanca Rodriges MRN: [id number]  SSN: [id number]    YOB: 1955  Age: 76 y.o. Sex: male      Subjective:      Blanca Rodriges is a 76 y.o. male who is being seen for cardiac arrest.  Patient with history of seizure, intracranial brain aneurysm status post repair, left hemiparesis. He presented to the emergency room with altered mental status and possible seizure, vomiting. He lives with his son who takes care of him. Patient at baseline is able to ambulate with a walker. He was found around 11 PM slumped over. He was hypotensive and received 2 boluses of normal saline. At the time of my evaluation patient is intubated in the ICU. History is taken from chart review and further discussion with other healthcare personnel. Past Medical History:   Diagnosis Date    CVA (cerebral vascular accident) ([facility]     Seizure ([facility]      History reviewed. No pertinent surgical history. History reviewed. No pertinent family history.   Social History     Tobacco Use    Smoking status: Former Smoker    Smokeless tobacco: Never Used   Substance Use Topics    Alcohol use: Not Currently      Current Facility-Administered Medications   Medication Dose Route Frequency Provider Last Rate Last Admin    levETIRAcetam (KEPPRA) 500 mg in saline (iso-osm) 100 mL IVPB (premix)  500 mg IntraVENous Q12H uLpis Talyor MD   500 mg at 08/04/21 1931    metoclopramide HCl (REGLAN) injection 10 mg  10 mg IntraVENous Q4H PRN Daniela Smalls MD        0.9% sodium chloride infusion  100 mL/hr IntraVENous CONTINUOUS Lupis Taylor  mL/hr at 08/04/21 0855 100 mL/hr at 08/04/21 0855    acetaminophen (TYLENOL) tablet 650 mg  650 mg Oral Q6H PRN Lupis Taylor MD        Or   [facility] acetaminophen (TYLENOL) suppository 650 mg  650 mg Rectal Q6H PRN Ghada Taylor MD        polyethylene glycol (MIRALAX) packet 17 g  17 g Oral DAILY PRN Ghada Taylor MD        ondansetron (ZOFRAN ODT) tablet 4 mg  4 mg Oral Q8H PRN Gene Taylor MD        Or    [facility] injection 4 mg  4 mg IntraVENous Q6H PRN Gene Taylor MD        enoxaparin (LOVENOX) injection 40 mg  40 mg SubCUTAneous DAILY Lupis Taylor MD   40 mg at 08/04/21 0902    aspirin tablet 325 mg  325 mg Oral DAILY Gene Taylor MD        atorvastatin (LIPITOR) tablet 40 mg  40 mg Oral QHS Lupis Taylor MD        piperacillin-tazobactam (ZOSYN) 3.375 g in 0.9% sodium chloride (MBP/ADV) 100 mL MBP  3.375 g IntraVENous Q8H Lupis Taylor  mL/hr at 08/04/21 1941 3.375 g at 08/04/21 1941    propofol (DIPRIVAN) 10 mg/mL infusion  0-50 mcg/kg/min IntraVENous TITRATE Meet Gil MD 3.9 mL/hr at 08/04/21 1800 10 mcg/kg/min at 08/04/21 1800    dexamethasone (DECADRON) 4 mg/mL injection 4 mg  4 mg IntraVENous Q6H Meet Gil MD   4 mg at 08/04/21 1941    NOREPINephrine (LEVOPHED) 8 mg in 5% dextrose 250mL (32 mcg/mL) infusion  10 mcg/min IntraVENous TITRATE Lupis Taylor MD 28.1 mL/hr at 08/04/21 2030 15 mcg/min at 08/04/21 2030        No Known Allergies    Review of Systems:  Unable to obtain    Objective:     Vitals:    08/04/21 1941 08/04/21 1944 08/04/21 1958 08/04/21 2008   BP: 104/73   (!) 83/67   Pulse: (!) 125 (!) 125 (!) 126 (!) 119   Resp: 14 15  19   Temp: 97.5 °F (36.4 °C)      SpO2: 98% 97%  100%   Weight:       Height:            Physical Exam:  General:   Intubated while having some jittery movement of right upper extremity   Eyes:  Conjunctivae/corneas clear. PERRL, EOMs intact. Fundi benign   Ears:  Normal TMs and external ear canals both ears. Nose: Nares normal. Septum midline. Mucosa normal. No drainage or sinus tenderness. Mouth/Throat: Lips, mucosa, and tongue normal. Teeth and gums normal.   Neck: Supple, symmetrical, trachea midline, no adenopathy, thyroid: no enlargment/tenderness/nodules, no carotid bruit and no JVD. Back:   Symmetric, no curvature.  ROM normal. No CVA tenderness. Lungs:   Clear to auscultation bilaterally. Heart:  Regular rate and rhythm, S1, S2 normal, no murmur, click, rub or gallop. Abdomen:   Soft, non-tender. Bowel sounds normal. No masses,  No organomegaly. Extremities:  Region on the left side without edema. Pulses: 2+ and symmetric all extremities. Skin: Skin color, texture, turgor normal. No rashes or lesions   Lymph nodes: Cervical, supraclavicular, and axillary nodes normal.   Neurologic:  Intubated. Assessment:     Hospital Problems  Never Reviewed        Codes Class Noted POA    Altered mental status ICD-10-CM: R41.82  ICD-9-CM: 780.97  8/4/2021 Unknown        Dysarthria ICD-10-CM: R47.1  ICD-9-CM: 784.51  8/4/2021 Unknown        AMS (altered mental status) ICD-10-CM: R41.82  ICD-9-CM: 780.97  8/4/2021 Unknown            Patient is a 79-year-old -American male with:  1. Cardiac arrest, possibly secondary to acute hypoxemic respiratory failure  2. Aspiration pneumonia  3. Sepsis  4. Seizure disorder  5. History of intracranial aneurysm repair left hemiparesis  6. History of hypertension  7. Acute hypoxemic respiratory failure          Plan:     EKG today showed normal sinus rhythm, LVH with repolarization changes. CT head was nonacute. There was bilateral area of encephalomalacia, atherosclerosis. Asymmetric airspace disease noted on chest x-ray throughout the left lung. Calcified aortic knob. MRI of the brain showed restricted diffusion of the cerebellar hemisphere. Troponin at 0.38 post intubation. This is probably related to demand supply mismatch. Creatinine is normal.  Liver function within normal limit. BNP is elevated. Hemoglobin 13.9 and platelet is 114. Recommendations:  1. Continue to trend troponin  2. Continue aspirin, atorvastatin  3. Levophed for blood pressure support  4. Obtain echocardiogram  5. Prognosis critical  6.   Follow-up on neurology input       Thank you Dr. Ludwig involving me in Mr. Jinny Flores care.   I will follow    Signed By: Cal Valderrama MD     August 4, 2021

## 2021-08-05 NOTE — PROCEDURES
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 DESCRIPTION:  Recording is done digitally on computer. Electrodes are placed in a 10-20 international system. Initial recording is equipment calibration followed by biocalibration. Initial montages are bipolar montage. TECHNIQUE:  Tracings started with the patient very drowsy. As the current recording continues, the patient was hooked up to an EKG machine that shows a heart rate of 108. Tracings continued with the patient in a very drowsy state. Photic stimulation and hyperventilation are not done. IMPRESSION:  This is an EEG showing the patient very drowsy. Additional information is that this patient has been posturing in a decerebrate fashion. In the absence of sedation and/or hypothermia, the prognosis is potentially calamitous.       Andreia Staley MD      [id number]  D:  2021 9:47  T:  2021 13:34  JOB #:  [id number]

## 2021-08-06 NOTE — PROGRESS NOTES
Progress Note    Patient: Radha Arnold MRN: [id number]  SSN: [id number]    YOB: 1955  Age: 76 y.o. Sex: male      Admit Date: 8/4/2021    LOS: 2 days     Subjective:     No acute events overnight. Temperature 102 this morning. Remains intubated    Objective:     Vitals:    08/06/21 0800 08/06/21 0900 08/06/21 1000 08/06/21 1100   BP: 121/64 (!) 140/82 112/79 136/86   Pulse: 95 95 (!) 116 (!) 107   Resp: 19 16 23 25   Temp: (!) 102 °F (38.9 °C) (!) 101.2 °F (38.4 °C) (!) 101.5 °F (38.6 °C) (!) 101.6 °F (38.7 °C)   SpO2: 99% 99% 95% 97%   Weight:       Height:            Intake and Output:  Current Shift: 08/06 0701 - 08/06 1900  In: 215   Out: 200 [Urine:200]  Last three shifts: 08/04 1901 - 08/06 0700  In: 430   Out: 1350 [[id number]]    Physical Exam:   General:   Intubated. Eyes:  Conjunctivae/corneas clear. PERRL, EOMs intact. Fundi benign   Ears:  Normal TMs and external ear canals both ears. Nose: Nares normal. Septum midline. Mucosa normal. No drainage or sinus tenderness. Mouth/Throat: Lips, mucosa, and tongue normal. Teeth and gums normal.   Neck: Supple, symmetrical, trachea midline, no adenopathy, thyroid: no enlargment/tenderness/nodules, no carotid bruit and no JVD. Back:   Symmetric, no curvature. ROM normal. No CVA tenderness. Lungs:   Clear to auscultation bilaterally. Heart:  Regular rate and rhythm, S1, S2 normal, no murmur, click, rub or gallop. Abdomen:   Soft, non-tender. Bowel sounds normal. No masses,  No organomegaly. Extremities: Extremities normal, atraumatic, no cyanosis or edema. Pulses: 2+ and symmetric all extremities. Skin: Skin color, texture, turgor normal. No rashes or lesions   Lymph nodes: Cervical, supraclavicular, and axillary nodes normal.   Neurologic:  Intubated. Lab/Data Review: All lab results for the last 24 hours reviewed.          Assessment:     Active Problems:    Altered mental status (8/4/2021)      Dysarthria (8/4/2021)      AMS (altered mental status) (8/4/2021)         Patient is a 77-year-old -American male with:  1. Cardiac arrest, possibly secondary to acute hypoxemic respiratory failure  2. Aspiration pneumonia  3. Sepsis  4. Seizure disorder  5. History of intracranial aneurysm repair left hemiparesis  6. History of hypertension  7. Acute hypoxemic respiratory failure     Plan:      remains in sinus rhythm. His troponin trended down. Poor overall prognosis. Patient remains full code per family wishes. No further cardiac testing planned at this time. Currently on aspirin and atorvastatin. I will sign off and remain available if needed.     Signed By: Mirna Darden MD     August 6, 2021

## 2021-08-06 NOTE — PROGRESS NOTES
General Daily Progress Note          Patient Name:   Maru Crandall       YOB: 1955       Age:  76 y.o. Admit Date: 8/4/2021      Subjective:     Patient admitted two days ago with altered mental status has remained unresponsive.  Patient's son reports that the patient's baseline, he is walking and talking. [facility] had a follow-up MRI yesterday, after the initial head CT showed no acute changes, which displayed acute infarction of cerebellar hemispheres, nemesio and midbrian.  Chest x-ray since admission shows extensive new left-sided infiltrate he is currently on the ventilator. He is in the ICU, with no changes in mentation today. He remains intubated ad on ventilator at 55% O2.     Patient not responding with no sedation    Objective:     Visit Vitals  /79 (BP 1 Location: Left upper arm, BP Patient Position: At rest)   Pulse (!) 116   Temp (!) 101.5 °F (38.6 °C)   Resp 23   Ht 5' 5\" (1.651 m)   Wt 65.8 kg (145 lb)   SpO2 95%   BMI 24.13 kg/m²        Recent Results (from the past 24 hour(s))   DUPLEX CAROTID BILATERAL    Collection Time: 08/05/21  1:20 PM   Result Value Ref Range    Left CCA dist sys 114.0 cm/s    Left CCA dist carreon 19.3 cm/s    Left CCA prox sys 156.0 cm/s    Left CCA prox carreon 27.9 cm/s    Left ECA sys 82.6 cm/s    LEFT EXTERNAL CAROTID ARTERY D 7.84 cm/s    Left ICA prox sys 96.9 cm/s    Left ICA prox carreon 23.4 cm/s    Left subclavian sys 145.0 cm/s    LEFT SUBCLAVIAN ARTERY D 26.90 cm/s    Left vertebral sys 53.7 cm/s    LEFT VERTEBRAL ARTERY D 6.45 cm/s    Right cca dist sys 121.0 cm/s    Right CCA dist carreon 23.7 cm/s    Right CCA prox sys 117.0 cm/s    Right CCA prox carreon 19.1 cm/s    Right ICA dist sys 68.5 cm/s    Right ICA dist carreon 16.1 cm/s    Right ICA prox sys 52.9 cm/s    Right ICA prox carreon 8.2 cm/s    Right eca sys 94.0 cm/s    RIGHT EXTERNAL CAROTID ARTERY D 14.90 cm/s    Right subclavian sys 98.5 cm/s    RIGHT SUBCLAVIAN ARTERY D 0.00 cm/s    Right vertebral sys 44.9 cm/s    RIGHT VERTEBRAL ARTERY D 9.55 cm/s   GLUCOSE, POC    Collection Time: 08/05/21  4:06 PM   Result Value Ref Range    Glucose (POC) 91 65 - 117 mg/dL    Performed by Nate Arnett ARTERIAL    Collection Time: 08/06/21  4:00 AM   Result Value Ref Range    pH 7.53 (H) 7.35 - 7.45      PCO2 29 (L) 35 - 45 mmHg    PO2 107 (H) 75 - 100 mmHg    O2 SAT >100 >95 %    BICARBONATE 24 22 - 26 mmol/L    BASE EXCESS 1.7 0 - 2 mmol/L    O2 METHOD VENT      FIO2 50.0 %    MODE Assist Control/Volume Control      Tidal volume 500      SET RATE 10      IPAP/PIP 0      EPAP/CPAP/PEEP 5.0      SITE Right Radial      KRUNAL'S TEST PASS     RENAL FUNCTION PANEL    Collection Time: 08/06/21  4:14 AM   Result Value Ref Range    Sodium 141 136 - 145 mmol/L    Potassium 3.9 3.5 - 5.1 mmol/L    Chloride 110 (H) 97 - 108 mmol/L    CO2 24 21 - 32 mmol/L    Anion gap 7 5 - 15 mmol/L    Glucose 103 (H) 65 - 100 mg/dL    BUN 29 (H) 6 - 20 mg/dL    Creatinine 1.13 0.70 - 1.30 mg/dL    BUN/Creatinine ratio 26 (H) 12 - 20      GFR est AA >60 >60 ml/min/1.73m2    GFR est non-AA >60 >60 ml/min/1.73m2    Calcium 8.0 (L) 8.5 - 10.1 mg/dL    Phosphorus 2.9 2.6 - 4.7 mg/dL    Albumin 2.6 (L) 3.5 - 5.0 g/dL   MAGNESIUM    Collection Time: 08/06/21  4:14 AM   Result Value Ref Range    Magnesium 2.0 1.6 - 2.4 mg/dL   CBC WITH AUTOMATED DIFF    Collection Time: 08/06/21  4:14 AM   Result Value Ref Range    WBC 24.5 (H) 4.1 - 11.1 K/uL    RBC 4.34 4.10 - 5.70 M/uL    HGB 13.0 12.1 - 17.0 g/dL    HCT 38.8 36.6 - 50.3 %    MCV 89.4 80.0 - 99.0 FL    MCH 30.0 26.0 - 34.0 PG    MCHC 33.5 30.0 - 36.5 g/dL    RDW 13.2 11.5 - 14.5 %    PLATELET 107 (L) 291 - 400 K/uL    MPV 11.1 8.9 - 12.9 FL    NRBC 0.0 0.0  WBC    ABSOLUTE NRBC 0.00 0.00 - 0.01 K/uL    NEUTROPHILS 96 (H) 32 - 75 %    LYMPHOCYTES 1 (L) 12 - 49 %    MONOCYTES 3 (L) 5 - 13 %    EOSINOPHILS 0 0 - 7 %    BASOPHILS 0 0 - 1 %    IMMATURE GRANULOCYTES 1 (H) 0 - 0.5 % ABS. NEUTROPHILS 23.1 (H) 1.8 - 8.0 K/UL    ABS. LYMPHOCYTES 0.3 (L) 0.8 - 3.5 K/UL    ABS. MONOCYTES 0.8 0.0 - 1.0 K/UL    ABS. EOSINOPHILS 0.0 0.0 - 0.4 K/UL    ABS. BASOPHILS 0.1 0.0 - 0.1 K/UL    ABS. IMM. GRANS. 0.2 (H) 0.00 - 0.04 K/UL    DF AUTOMATED     TROPONIN I    Collection Time: 08/06/21  4:14 AM   Result Value Ref Range    Troponin-I, Qt. 1.61 (H) <4.43 ng/mL   METABOLIC PANEL, COMPREHENSIVE    Collection Time: 08/06/21  4:14 AM   Result Value Ref Range    Sodium 140 136 - 145 mmol/L    Potassium 3.9 3.5 - 5.1 mmol/L    Chloride 110 (H) 97 - 108 mmol/L    CO2 25 21 - 32 mmol/L    Anion gap 5 5 - 15 mmol/L    Glucose 102 (H) 65 - 100 mg/dL    BUN 29 (H) 6 - 20 mg/dL    Creatinine 1.15 0.70 - 1.30 mg/dL    BUN/Creatinine ratio 25 (H) 12 - 20      GFR est AA >60 >60 ml/min/1.73m2    GFR est non-AA >60 >60 ml/min/1.73m2    Calcium 8.0 (L) 8.5 - 10.1 mg/dL    Bilirubin, total 1.0 0.2 - 1.0 mg/dL    AST (SGOT) 107 (H) 15 - 37 U/L    ALT (SGPT) 61 12 - 78 U/L    Alk. phosphatase 41 (L) 45 - 117 U/L    Protein, total 6.3 (L) 6.4 - 8.2 g/dL    Albumin 2.6 (L) 3.5 - 5.0 g/dL    Globulin 3.7 2.0 - 4.0 g/dL    A-G Ratio 0.7 (L) 1.1 - 2.2       [unfilled]      Review of Systems    Constitutional: Negative for chills and fever. HENT: Negative. Eyes: Negative. Respiratory: Negative. Cardiovascular: Negative. Gastrointestinal: Negative for abdominal pain and nausea. Skin: Negative. Neurological: Negative. Physical Exam:      Constitutional: pt is oriented to person, place, and time. HENT:   Head: Normocephalic and atraumatic. Eyes: Pupils are equal, round, and reactive to light. EOM are normal.   Cardiovascular: Normal rate, regular rhythm and normal heart sounds. Pulmonary/Chest: Breath sounds normal. No wheezes. No rales. Exhibits no tenderness. Abdominal: Soft. Bowel sounds are normal. There is no abdominal tenderness. There is no rebound and no guarding.    Musculoskeletal: Normal range of motion. Neurological: pt is alert and oriented to person, place, and time. XR CHEST PORT   Final Result      DUPLEX CAROTID BILATERAL   Final Result      XR CHEST PORT   Final Result      XR CHEST PORT   Final Result   ET tube placed, with tip 7 mm above the doretha. Asymmetric airspace   disease noted throughout the left lung, consistent with pneumonia, improved from   previous. Right lung clear. Stable normal heart size. Calcified aortic knob. XR CHEST PORT   Final Result   New diffuse airspace opacity in the left lung. MRI BRAIN WO CONT   Final Result   1. Restricted diffusion involving the cerebellar hemispheres, nemesio, and   possibly the left midbrain most likely representing acute ischemic infarcts   although the brainstem involvement may be partially artifactual. Consider a   short-term interval follow-up MRI. 2.  Attenuated vertebrobasilar flow voids which may be related to underlying   stenosis. CTA or MRA is recommended for further evaluation. 3.  Extensive multifocal encephalomalacia as described above. Report called to the patient's ICU nurse, Milton Badillo at 5:45 PM on   8/4/2021 via phone conversation. XR CHEST PORT   Final Result   The cardiomediastinal silhouette is appropriate for age, technique,   and lung expansion. Pulmonary vasculature is not congested. The lungs are   essentially clear. No effusion or pneumothorax is seen. CT CODE NEURO HEAD WO CONTRAST   Final Result      No acute intracranial bleed. Bilateral areas of encephalomalacia. Atherosclerosis. A noncontrast head CT does not exclude the possibility of an   acute ischemic stroke. MRI is more sensitive for evaluation of brain parenchyma. Clinical correlation is recommended. Follow-up as clinically indicated. The   results were called and verbally communicated to Dr. Shahzad Wood on 8/4/2021 at   1:00 AM.      Dental disease. Please see full report.       XR CHEST PORT (Results Pending)        Recent Results (from the past 24 hour(s))   DUPLEX CAROTID BILATERAL    Collection Time: 08/05/21  1:20 PM   Result Value Ref Range    Left CCA dist sys 114.0 cm/s    Left CCA dist carreno 19.3 cm/s    Left CCA prox sys 156.0 cm/s    Left CCA prox carreon 27.9 cm/s    Left ECA sys 82.6 cm/s    LEFT EXTERNAL CAROTID ARTERY D 7.84 cm/s    Left ICA prox sys 96.9 cm/s    Left ICA prox carreon 23.4 cm/s    Left subclavian sys 145.0 cm/s    LEFT SUBCLAVIAN ARTERY D 26.90 cm/s    Left vertebral sys 53.7 cm/s    LEFT VERTEBRAL ARTERY D 6.45 cm/s    Right cca dist sys 121.0 cm/s    Right CCA dist carreon 23.7 cm/s    Right CCA prox sys 117.0 cm/s    Right CCA prox carreon 19.1 cm/s    Right ICA dist sys 68.5 cm/s    Right ICA dist carreon 16.1 cm/s    Right ICA prox sys 52.9 cm/s    Right ICA prox carreon 8.2 cm/s    Right eca sys 94.0 cm/s    RIGHT EXTERNAL CAROTID ARTERY D 14.90 cm/s    Right subclavian sys 98.5 cm/s    RIGHT SUBCLAVIAN ARTERY D 0.00 cm/s    Right vertebral sys 44.9 cm/s    RIGHT VERTEBRAL ARTERY D 9.55 cm/s   GLUCOSE, POC    Collection Time: 08/05/21  4:06 PM   Result Value Ref Range    Glucose (POC) 91 65 - 117 mg/dL    Performed by Dominick Magana    BLOOD GAS, ARTERIAL    Collection Time: 08/06/21  4:00 AM   Result Value Ref Range    pH 7.53 (H) 7.35 - 7.45      PCO2 29 (L) 35 - 45 mmHg    PO2 107 (H) 75 - 100 mmHg    O2 SAT >100 >95 %    BICARBONATE 24 22 - 26 mmol/L    BASE EXCESS 1.7 0 - 2 mmol/L    O2 METHOD VENT      FIO2 50.0 %    MODE Assist Control/Volume Control      Tidal volume 500      SET RATE 10      IPAP/PIP 0      EPAP/CPAP/PEEP 5.0      SITE Right Radial      KRUNAL'S TEST PASS     RENAL FUNCTION PANEL    Collection Time: 08/06/21  4:14 AM   Result Value Ref Range    Sodium 141 136 - 145 mmol/L    Potassium 3.9 3.5 - 5.1 mmol/L    Chloride 110 (H) 97 - 108 mmol/L    CO2 24 21 - 32 mmol/L    Anion gap 7 5 - 15 mmol/L    Glucose 103 (H) 65 - 100 mg/dL    BUN 29 (H) 6 - 20 mg/dL Creatinine 1.13 0.70 - 1.30 mg/dL    BUN/Creatinine ratio 26 (H) 12 - 20      GFR est AA >60 >60 ml/min/1.73m2    GFR est non-AA >60 >60 ml/min/1.73m2    Calcium 8.0 (L) 8.5 - 10.1 mg/dL    Phosphorus 2.9 2.6 - 4.7 mg/dL    Albumin 2.6 (L) 3.5 - 5.0 g/dL   MAGNESIUM    Collection Time: 08/06/21  4:14 AM   Result Value Ref Range    Magnesium 2.0 1.6 - 2.4 mg/dL   CBC WITH AUTOMATED DIFF    Collection Time: 08/06/21  4:14 AM   Result Value Ref Range    WBC 24.5 (H) 4.1 - 11.1 K/uL    RBC 4.34 4.10 - 5.70 M/uL    HGB 13.0 12.1 - 17.0 g/dL    HCT 38.8 36.6 - 50.3 %    MCV 89.4 80.0 - 99.0 FL    MCH 30.0 26.0 - 34.0 PG    MCHC 33.5 30.0 - 36.5 g/dL    RDW 13.2 11.5 - 14.5 %    PLATELET 640 (L) 668 - 400 K/uL    MPV 11.1 8.9 - 12.9 FL    NRBC 0.0 0.0  WBC    ABSOLUTE NRBC 0.00 0.00 - 0.01 K/uL    NEUTROPHILS 96 (H) 32 - 75 %    LYMPHOCYTES 1 (L) 12 - 49 %    MONOCYTES 3 (L) 5 - 13 %    EOSINOPHILS 0 0 - 7 %    BASOPHILS 0 0 - 1 %    IMMATURE GRANULOCYTES 1 (H) 0 - 0.5 %    ABS. NEUTROPHILS 23.1 (H) 1.8 - 8.0 K/UL    ABS. LYMPHOCYTES 0.3 (L) 0.8 - 3.5 K/UL    ABS. MONOCYTES 0.8 0.0 - 1.0 K/UL    ABS. EOSINOPHILS 0.0 0.0 - 0.4 K/UL    ABS. BASOPHILS 0.1 0.0 - 0.1 K/UL    ABS. IMM.  GRANS. 0.2 (H) 0.00 - 0.04 K/UL    DF AUTOMATED     TROPONIN I    Collection Time: 08/06/21  4:14 AM   Result Value Ref Range    Troponin-I, Qt. 1.61 (H) <3.52 ng/mL   METABOLIC PANEL, COMPREHENSIVE    Collection Time: 08/06/21  4:14 AM   Result Value Ref Range    Sodium 140 136 - 145 mmol/L    Potassium 3.9 3.5 - 5.1 mmol/L    Chloride 110 (H) 97 - 108 mmol/L    CO2 25 21 - 32 mmol/L    Anion gap 5 5 - 15 mmol/L    Glucose 102 (H) 65 - 100 mg/dL    BUN 29 (H) 6 - 20 mg/dL    Creatinine 1.15 0.70 - 1.30 mg/dL    BUN/Creatinine ratio 25 (H) 12 - 20      GFR est AA >60 >60 ml/min/1.73m2    GFR est non-AA >60 >60 ml/min/1.73m2    Calcium 8.0 (L) 8.5 - 10.1 mg/dL    Bilirubin, total 1.0 0.2 - 1.0 mg/dL    AST (SGOT) 107 (H) 15 - 37 U/L    ALT (SGPT) 61 12 - 78 U/L    Alk.  phosphatase 41 (L) 45 - 117 U/L    Protein, total 6.3 (L) 6.4 - 8.2 g/dL    Albumin 2.6 (L) 3.5 - 5.0 g/dL    Globulin 3.7 2.0 - 4.0 g/dL    A-G Ratio 0.7 (L) 1.1 - 2.2         Results     Procedure Component Value Units Date/Time    CULTURE, BLOOD, LINE [[id number]] Collected: 08/05/21 0102    Order Status: Completed Specimen: Blood Updated: 08/05/21 0118    CULTURE, RESPIRATORY/SPUTUM/BRONCH Rhiannon Keane STAIN [[id number]] Collected: 08/04/21 2230    Order Status: Completed Specimen: Sputum Updated: 08/05/21 1404     Special Requests: No Special Requests        GRAM STAIN Rare WBCs seen         Rare EPITHELIAL CELLS               Occasional Gram Positive Cocci in clusters                  Occasional Gram Negative Rods           Culture result: PENDING    CULTURE, URINE [[id number]] Collected: 08/04/21 1841    Order Status: Completed Specimen: Urine Updated: 08/06/21 0729     Special Requests: No Special Requests        Culture result: No Growth (<1000 cfu/mL)       MRSA SCREEN - PCR (NASAL) [[id number]] Collected: 08/04/21 1841    Order Status: Completed Specimen: Swab Updated: 08/05/21 0027     MRSA by PCR, Nasal Not Detected       CULTURE, URINE [[id number]] Collected: 08/04/21 1100    Order Status: Completed Specimen: Urine Updated: 08/05/21 1416     Special Requests: No Special Requests        Culture result:       No significant growth, <10,000 CFU/mL          CULTURE, BLOOD #2 [[id number]] Collected: 08/04/21 1025    Order Status: Completed Specimen: Blood Updated: 08/06/21 0735     Special Requests: No Special Requests        Culture result: No growth 2 days       CULTURE, BLOOD #1 [[id number]] Collected: 08/04/21 1020    Order Status: Completed Specimen: Blood Updated: 08/06/21 0735     Special Requests: No Special Requests        Culture result: No growth 2 days              Labs:     Recent Labs     08/06/21  0414 08/05/21  0415   WBC 24.5* 13.3*   HGB 13.0 14.5   HCT 38.8 43.4   PLT 147* 176     Recent Labs     08/06/21 0414 08/05/21 0102 08/04/21  0130     140 137  136 137   K 3.9  3.9 3.9  3.8 3.8   *  110* 108  107 104   CO2 24  25 19*  23 29   BUN 29*  29* 22*  22* 24*   CREA 1.13  1.15 1.32*  1.29 0.78   *  102* 119*  125* 99   CA 8.0*  8.0* 6.1*  8.3* 8.6   MG 2.0 1.9  --    PHOS 2.9 1.4*  --      Recent Labs     08/06/21 0414 08/05/21 0102 08/04/21  0130   ALT 61 <6* 33   AP 41* 48 70   TBILI 1.0 1.6* 0.5   TP 6.3* 6.6 7.9   ALB 2.6*  2.6* 2.7*  2.7* 3.4*   GLOB 3.7 3.9 4.5*     Recent Labs     08/04/21  0440   INR 1.1   PTP 14.0   APTT 24.7      No results for input(s): FE, TIBC, PSAT, FERR in the last 72 hours.    No results found for: FOL, RBCF   Recent Labs     08/06/21  0400 08/05/21  0400   PH 7.53* 7.45   PCO2 29* 37   PO2 107* 134*     Recent Labs     08/06/21 0414 08/05/21 0102 08/04/21  1925   TROIQ 1.61* 1.38* 0.38*     No results found for: CHOL, CHOLX, CHLST, CHOLV, HDL, HDLP, LDL, LDLC, DLDLP, TGLX, TRIGL, TRIGP, CHHD, CHHDX  Lab Results   Component Value Date/Time    Glucose (POC) 91 08/05/2021 04:06 PM    Glucose (POC) 96 08/04/2021 05:10 PM    Glucose (POC) 122 (H) 08/04/2021 08:15 AM    Glucose (POC) 104 08/04/2021 12:53 AM     Lab Results   Component Value Date/Time    Color Yellow/Straw 08/04/2021 06:41 PM    Appearance Clear 08/04/2021 06:41 PM    Specific gravity 1.026 08/04/2021 06:41 PM    pH (UA) 6.0 08/04/2021 06:41 PM    Protein 30 (A) 08/04/2021 06:41 PM    Glucose 50 (A) 08/04/2021 06:41 PM    Ketone 5 (A) 08/04/2021 06:41 PM    Bilirubin Negative 08/04/2021 06:41 PM    Urobilinogen 4.0 (H) 08/04/2021 06:41 PM    Nitrites Negative 08/04/2021 06:41 PM    Leukocyte Esterase Negative 08/04/2021 06:41 PM    Bacteria Negative 08/04/2021 06:41 PM    WBC 0-4 08/04/2021 06:41 PM    RBC 0-5 08/04/2021 06:41 PM         Assessment:   Acute Brainstem and cerebellar infarct  Acute hypoxemic respiratory failure intubated  Aspiration pneumonia  Sepsis  History of CVA  History of seizures  Acute kidney injury    · LV: Estimated LVEF is 55 - 60%. Normal cavity size, wall thickness and systolic function (ejection fraction normal). Wall motion: unable to assess due to limited image quality. Mild (grade 1) left ventricular diastolic dysfunction. · Mild diffuse plaque of both left and right CCA. · Can not visualize left ICA well.     Plan:   Patient in ICU on ventilator 55% O2  On propofol drip for sedation  On Keppra 500 IV every 12 hours for seizures  Start on IV Zosyn for aspiration pneumonia  Patient on Levophed drip for hypotension  On aspirin and Lipitor     Continue IV fluids    We will place NG tube and start feeding  Dietitian consult     Overall prognosis poor      Discussed with the patient's son yesterday on the phone    Patient still full code          Current Facility-Administered Medications:     zinc oxide-white petrolatum 17-57 % topical paste, , Topical, TID, Alessandro Baker MD, Given at 08/06/21 0935    levETIRAcetam (KEPPRA) 500 mg in saline (iso-osm) 100 mL IVPB (premix), 500 mg, IntraVENous, Q12H, Lupis Taylor MD, 500 mg at 08/06/21 0608    metoclopramide HCl (REGLAN) injection 10 mg, 10 mg, IntraVENous, Q4H PRN, Alessandro Baker MD    0.9% sodium chloride infusion, 100 mL/hr, IntraVENous, CONTINUOUS, Lupis Taylor MD, Last Rate: 100 mL/hr at 08/06/21 0813, 100 mL/hr at 08/06/21 0813    acetaminophen (TYLENOL) tablet 650 mg, 650 mg, Oral, Q6H PRN, 650 mg at 08/05/21 1522 **OR** acetaminophen (TYLENOL) suppository 650 mg, 650 mg, Rectal, Q6H PRN, Rafa Taylor MD    polyethylene glycol (MIRALAX) packet 17 g, 17 g, Oral, DAILY PRN, Rafa Taylor MD    ondansetron (ZOFRAN ODT) tablet 4 mg, 4 mg, Oral, Q8H PRN **OR** ondansetron (ZOFRAN) injection 4 mg, 4 mg, IntraVENous, Q6H PRN, Lupis Taylor MD    enoxaparin (LOVENOX) injection 40 mg, 40 mg, SubCUTAneous, DAILY, Claudia, Farshad Joy MD, 40 mg at 08/06/21 9615    aspirin tablet 325 mg, 325 mg, Oral, DAILY, Lupis Taylor MD, 325 mg at 08/06/21 0934    atorvastatin (LIPITOR) tablet 40 mg, 40 mg, Oral, QHS, Lupis Taylor MD, 40 mg at 08/05/21 2202    piperacillin-tazobactam (ZOSYN) 3.375 g in 0.9% sodium chloride (MBP/ADV) 100 mL MBP, 3.375 g, IntraVENous, Q8H, Lupis Taylor MD, Last Rate: 200 mL/hr at 08/06/21 0406, 3.375 g at 08/06/21 0406    propofol (DIPRIVAN) 10 mg/mL infusion, 0-50 mcg/kg/min, IntraVENous, TITRATE, Viridiana Noonan MD, Stopped at 08/06/21 0940    dexamethasone (DECADRON) 4 mg/mL injection 4 mg, 4 mg, IntraVENous, Q6H, Viridiana Noonan MD, 4 mg at 08/06/21 0608    NOREPINephrine (LEVOPHED) 8 mg in 5% dextrose 250mL (32 mcg/mL) infusion, 10 mcg/min, IntraVENous, TITRATE, Farshad Taylor MD, Stopped at 08/05/21 1111                                                                                                *ATTENTION:  This note has been created by a medical student for educational purposes only. Please do not refer to the content of this note for clinical decision-making, billing, or other purposes. Please see attending physicians note to obtain clinical information on this patient. *

## 2021-08-06 NOTE — PROGRESS NOTES
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     ALLERGIES:    No Known Allergies    MEDS:      Current Facility-Administered Medications:     zinc oxide-white petrolatum 17-57 % topical paste, , Topical, TID, Lupis Taylor MD, Given at 08/06/21 0935    levETIRAcetam (KEPPRA) 500 mg in saline (iso-osm) 100 mL IVPB (premix), 500 mg, IntraVENous, Q12H, Lupis Taylor MD, 500 mg at 08/06/21 0608    metoclopramide HCl (REGLAN) injection 10 mg, 10 mg, IntraVENous, Q4H PRN, Jeff Taylor MD    0.9% sodium chloride infusion, 100 mL/hr, IntraVENous, CONTINUOUS, Lupis Taylor MD, Last Rate: 100 mL/hr at 08/06/21 0813, 100 mL/hr at 08/06/21 0813    acetaminophen (TYLENOL) tablet 650 mg, 650 mg, Oral, Q6H PRN, 650 mg at 08/05/21 1522 **OR** acetaminophen (TYLENOL) suppository 650 mg, 650 mg, Rectal, Q6H PRN, Jeff Taylor MD    polyethylene glycol (MIRALAX) packet 17 g, 17 g, Oral, DAILY PRN, Jeff Taylor MD    ondansetron (ZOFRAN ODT) tablet 4 mg, 4 mg, Oral, Q8H PRN **OR** ondansetron (ZOFRAN) injection 4 mg, 4 mg, IntraVENous, Q6H PRN, Lupis Taylor MD    enoxaparin (LOVENOX) injection 40 mg, 40 mg, SubCUTAneous, DAILY, Lupis Taylor MD, 40 mg at 08/06/21 7201    aspirin tablet 325 mg, 325 mg, Oral, DAILY, Lupis Taylor MD, 325 mg at 08/06/21 0934    atorvastatin (LIPITOR) tablet 40 mg, 40 mg, Oral, QHS, Lupis Taylor MD, 40 mg at 08/05/21 2202    piperacillin-tazobactam (ZOSYN) 3.375 g in 0.9% sodium chloride (MBP/ADV) 100 mL MBP, 3.375 g, IntraVENous, Q8H, Lupis Taylor MD, Last Rate: 200 mL/hr at 08/06/21 0406, 3.375 g at 08/06/21 0406    propofol (DIPRIVAN) 10 mg/mL infusion, 0-50 mcg/kg/min, IntraVENous, TITRATE, Lissette Rodrigues MD, Stopped at 08/06/21 0940    dexamethasone (DECADRON) 4 mg/mL injection 4 mg, 4 mg, IntraVENous, Q6H, Lissette Rodrigues MD, 4 mg at 08/06/21 0608    NOREPINephrine (LEVOPHED) 8 mg in 5% dextrose 250mL (32 mcg/mL) infusion, 10 mcg/min, IntraVENous, TITRATE, Lupis Taylor MD, Stopped at 08/05/21 1111    LABS:  Recent Results (from the past 24 hour(s))   DUPLEX CAROTID BILATERAL    Collection Time: 08/05/21  1:20 PM   Result Value Ref Range    Left CCA dist sys 114.0 cm/s    Left CCA dist carreon 19.3 cm/s    Left CCA prox sys 156.0 cm/s    Left CCA prox carreon 27.9 cm/s    Left ECA sys 82.6 cm/s    LEFT EXTERNAL CAROTID ARTERY D 7.84 cm/s    Left ICA prox sys 96.9 cm/s    Left ICA prox carreon 23.4 cm/s    Left subclavian sys 145.0 cm/s    LEFT SUBCLAVIAN ARTERY D 26.90 cm/s    Left vertebral sys 53.7 cm/s    LEFT VERTEBRAL ARTERY D 6.45 cm/s    Right cca dist sys 121.0 cm/s    Right CCA dist carreon 23.7 cm/s    Right CCA prox sys 117.0 cm/s    Right CCA prox carreon 19.1 cm/s    Right ICA dist sys 68.5 cm/s    Right ICA dist carreon 16.1 cm/s    Right ICA prox sys 52.9 cm/s    Right ICA prox carreon 8.2 cm/s    Right eca sys 94.0 cm/s    RIGHT EXTERNAL CAROTID ARTERY D 14.90 cm/s    Right subclavian sys 98.5 cm/s    RIGHT SUBCLAVIAN ARTERY D 0.00 cm/s    Right vertebral sys 44.9 cm/s    RIGHT VERTEBRAL ARTERY D 9.55 cm/s   GLUCOSE, POC    Collection Time: 08/05/21  4:06 PM   Result Value Ref Range    Glucose (POC) 91 65 - 117 mg/dL    Performed by Nate Arnett, ARTERIAL    Collection Time: 08/06/21  4:00 AM   Result Value Ref Range    pH 7.53 (H) 7.35 - 7.45      PCO2 29 (L) 35 - 45 mmHg    PO2 107 (H) 75 - 100 mmHg    O2 SAT >100 >95 %    BICARBONATE 24 22 - 26 mmol/L    BASE EXCESS 1.7 0 - 2 mmol/L    O2 METHOD VENT      FIO2 50.0 %    MODE Assist Control/Volume Control      Tidal volume 500      SET RATE 10      IPAP/PIP 0      EPAP/CPAP/PEEP 5.0      SITE Right Radial      KRUNAL'S TEST PASS     RENAL FUNCTION PANEL    Collection Time: 08/06/21  4:14 AM   Result Value Ref Range    Sodium 141 136 - 145 mmol/L    Potassium 3.9 3.5 - 5.1 mmol/L    Chloride 110 (H) 97 - 108 mmol/L    CO2 24 21 - 32 mmol/L    Anion gap 7 5 - 15 mmol/L    Glucose 103 (H) 65 - 100 mg/dL    BUN 29 (H) 6 - 20 mg/dL    Creatinine 1.13 0.70 - 1.30 mg/dL    BUN/Creatinine ratio 26 (H) 12 - 20      GFR est AA >60 >60 ml/min/1.73m2    GFR est non-AA >60 >60 ml/min/1.73m2    Calcium 8.0 (L) 8.5 - 10.1 mg/dL    Phosphorus 2.9 2.6 - 4.7 mg/dL    Albumin 2.6 (L) 3.5 - 5.0 g/dL   MAGNESIUM    Collection Time: 08/06/21  4:14 AM   Result Value Ref Range    Magnesium 2.0 1.6 - 2.4 mg/dL   CBC WITH AUTOMATED DIFF    Collection Time: 08/06/21  4:14 AM   Result Value Ref Range    WBC 24.5 (H) 4.1 - 11.1 K/uL    RBC 4.34 4.10 - 5.70 M/uL    HGB 13.0 12.1 - 17.0 g/dL    HCT 38.8 36.6 - 50.3 %    MCV 89.4 80.0 - 99.0 FL    MCH 30.0 26.0 - 34.0 PG    MCHC 33.5 30.0 - 36.5 g/dL    RDW 13.2 11.5 - 14.5 %    PLATELET 905 (L) 497 - 400 K/uL    MPV 11.1 8.9 - 12.9 FL    NRBC 0.0 0.0  WBC    ABSOLUTE NRBC 0.00 0.00 - 0.01 K/uL    NEUTROPHILS 96 (H) 32 - 75 %    LYMPHOCYTES 1 (L) 12 - 49 %    MONOCYTES 3 (L) 5 - 13 %    EOSINOPHILS 0 0 - 7 %    BASOPHILS 0 0 - 1 %    IMMATURE GRANULOCYTES 1 (H) 0 - 0.5 %    ABS. NEUTROPHILS 23.1 (H) 1.8 - 8.0 K/UL    ABS. LYMPHOCYTES 0.3 (L) 0.8 - 3.5 K/UL    ABS. MONOCYTES 0.8 0.0 - 1.0 K/UL    ABS. EOSINOPHILS 0.0 0.0 - 0.4 K/UL    ABS. BASOPHILS 0.1 0.0 - 0.1 K/UL    ABS. IMM.  GRANS. 0.2 (H) 0.00 - 0.04 K/UL    DF AUTOMATED     TROPONIN I    Collection Time: 08/06/21  4:14 AM   Result Value Ref Range    Troponin-I, Qt. 1.61 (H) <1.15 ng/mL   METABOLIC PANEL, COMPREHENSIVE    Collection Time: 08/06/21  4:14 AM   Result Value Ref Range    Sodium 140 136 - 145 mmol/L    Potassium 3.9 3.5 - 5.1 mmol/L    Chloride 110 (H) 97 - 108 mmol/L    CO2 25 21 - 32 mmol/L    Anion gap 5 5 - 15 mmol/L    Glucose 102 (H) 65 - 100 mg/dL    BUN 29 (H) 6 - 20 mg/dL    Creatinine 1.15 0.70 - 1.30 mg/dL    BUN/Creatinine ratio 25 (H) 12 - 20      GFR est AA >60 >60 ml/min/1.73m2    GFR est non-AA >60 >60 ml/min/1.73m2    Calcium 8.0 (L) 8.5 - 10.1 mg/dL    Bilirubin, total 1.0 0.2 - 1.0 mg/dL    AST (SGOT) 107 (H) 15 - 37 U/L    ALT (SGPT) 61 12 - 78 U/L    Alk. phosphatase 41 (L) 45 - 117 U/L    Protein, total 6.3 (L) 6.4 - 8.2 g/dL    Albumin 2.6 (L) 3.5 - 5.0 g/dL    Globulin 3.7 2.0 - 4.0 g/dL    A-G Ratio 0.7 (L) 1.1 - 2.2         Visit Vitals  /86 (BP 1 Location: Left upper arm, BP Patient Position: At rest)   Pulse (!) 107   Temp (!) 101.6 °F (38.7 °C)   Resp 25   Ht 5' 5\" (1.651 m)   Wt 65.8 kg (145 lb)   SpO2 97%   BMI 24.13 kg/m²       Imaging:  XR CHEST PORT   Final Result      DUPLEX CAROTID BILATERAL   Final Result      XR CHEST PORT   Final Result      XR CHEST PORT   Final Result   ET tube placed, with tip 7 mm above the doretha. Asymmetric airspace   disease noted throughout the left lung, consistent with pneumonia, improved from   previous. Right lung clear. Stable normal heart size. Calcified aortic knob. XR CHEST PORT   Final Result   New diffuse airspace opacity in the left lung. MRI BRAIN WO CONT   Final Result   1. Restricted diffusion involving the cerebellar hemispheres, nemesio, and   possibly the left midbrain most likely representing acute ischemic infarcts   although the brainstem involvement may be partially artifactual. Consider a   short-term interval follow-up MRI. 2.  Attenuated vertebrobasilar flow voids which may be related to underlying   stenosis. CTA or MRA is recommended for further evaluation. 3.  Extensive multifocal encephalomalacia as described above.          Report called to the patient's ICU nurse, Lisa Oropeza at 5:45 PM on   8/4/2021 via phone conversation. XR CHEST PORT   Final Result   The cardiomediastinal silhouette is appropriate for age, technique,   and lung expansion. Pulmonary vasculature is not congested. The lungs are   essentially clear. No effusion or pneumothorax is seen. CT CODE NEURO HEAD WO CONTRAST   Final Result      No acute intracranial bleed. Bilateral areas of encephalomalacia. Atherosclerosis. A noncontrast head CT does not exclude the possibility of an   acute ischemic stroke. MRI is more sensitive for evaluation of brain parenchyma. Clinical correlation is recommended. Follow-up as clinically indicated. The   results were called and verbally communicated to Dr. Angelique Carrel on 8/4/2021 at   1:00 AM.      Dental disease. Please see full report.       XR CHEST PORT    (Results Pending)

## 2021-08-06 NOTE — MED STUDENT NOTES
General Daily Progress Note          Patient Name:   Marla Perez       YOB: 1955       Age:  76 y.o. Admit Date: 8/4/2021      Subjective:     Patient admitted two days ago with altered mental status has remained unresponsive.  Patient's son reports that the patient's baseline, he is walking and talking. [facility] had a follow-up MRI yesterday, after the initial head CT showed no acute changes, which displayed acute infarction of cerebellar hemispheres, nemesoi and midbrian.  Chest x-ray since admission shows extensive new left-sided infiltrate he is currently on the ventilator. He is in the ICU, with no changes in mentation today. He remains intubated ad on ventilator at 55% O2.     Objective:     Visit Vitals  /64 (BP 1 Location: Left lower arm, BP Patient Position: At rest)   Pulse 95   Temp (!) 101.4 °F (38.6 °C)   Resp 19   Ht 5' 5\" (1.651 m)   Wt 145 lb (65.8 kg)   SpO2 99%   BMI 24.13 kg/m²        Recent Results (from the past 24 hour(s))   ECHO ADULT COMPLETE    Collection Time: 08/05/21  9:46 AM   Result Value Ref Range    LV ED Vol A4C 44.00 cm3    LV ED Vol A2C 71.00 cm3    LV ES Vol A4C 27.00 cm3    LV ES Vol A2C 24.90 cm3    IVSd 0.83 0.60 - 1.00 cm    LVIDd 4.14 (A) 4.20 - 5.90 cm    LVIDs 2.92 cm    LVOT d 1.90 cm    Left Ventricular Outflow Tract Mean Gradient 3.00 mmHg    LVOT VTI 16.00 cm    LVOT VTI 16.00 cm    LVOT Peak Velocity 114.00 cm/s    LVOT Peak Gradient 5.00 mmHg    LVPWd 0.91 0.60 - 1.00 cm    LV E' Septal Velocity 5.56 cm/s    E/E' septal 13.97     LVOT SV 45.0 cm3    Aortic Valve Systolic Mean Gradient 4.02 mmHg    AoV VTI 12.10 cm    Aortic valve mean velocity 80.40 cm/s    Aortic Valve Systolic Peak Velocity 206.49 cm/s    AoV PG 5.00 mmHg    Aortic Valve Area by Continuity of VTI 3.76 cm2    Aortic Valve Area by Continuity of Peak Velocity 2.89 cm2    Ao Root D 2.70 cm    AO ASC D 2.80 cm    Mitral Valve Max Velocity 145.00 cm/s    MV Peak Gradient 8.00 mmHg Mitral Valve Deceleration [place] 9,500.00 mm/s2    Mitral Valve Deceleration [place] 9,500.00 mm/s2    Mitral Valve Pressure Half-time 24.00 ms    MV A Aaron 125.00 cm/s    MV E Aaron 77.70 cm/s    MV Mean Gradient 4.00 mmHg    Mitral Valve Annulus Velocity Time Integral 22.40 cm    MVA (PHT) 9.17 cm2    MV E/A 0.62     Est. RA Pressure 5.00 mmHg    Tricuspid Valve Max Velocity 99.90 cm/s    Triscuspid Valve Regurgitation Peak Gradient 4.00 mmHg    RVSP 9.00 mmHg    LV Mass .7 88.0 - 224.0 g    LV Mass AL Index 64.0 49.0 - 115.0 g/m2    NANI/BSA Pk Aaron 1.7 cm2/m2    NANI/BSA VTI 2.2 cm2/m2   DUPLEX CAROTID BILATERAL    Collection Time: 08/05/21  1:20 PM   Result Value Ref Range    Left CCA dist sys 114.0 cm/s    Left CCA dist carreon 19.3 cm/s    Left CCA prox sys 156.0 cm/s    Left CCA prox carreon 27.9 cm/s    Left ECA sys 82.6 cm/s    LEFT EXTERNAL CAROTID ARTERY D 7.84 cm/s    Left ICA prox sys 96.9 cm/s    Left ICA prox carreon 23.4 cm/s    Left subclavian sys 145.0 cm/s    LEFT SUBCLAVIAN ARTERY D 26.90 cm/s    Left vertebral sys 53.7 cm/s    LEFT VERTEBRAL ARTERY D 6.45 cm/s    Right cca dist sys 121.0 cm/s    Right CCA dist carreon 23.7 cm/s    Right CCA prox sys 117.0 cm/s    Right CCA prox carreon 19.1 cm/s    Right ICA dist sys 68.5 cm/s    Right ICA dist carreon 16.1 cm/s    Right ICA prox sys 52.9 cm/s    Right ICA prox carreon 8.2 cm/s    Right eca sys 94.0 cm/s    RIGHT EXTERNAL CAROTID ARTERY D 14.90 cm/s    Right subclavian sys 98.5 cm/s    RIGHT SUBCLAVIAN ARTERY D 0.00 cm/s    Right vertebral sys 44.9 cm/s    RIGHT VERTEBRAL ARTERY D 9.55 cm/s   GLUCOSE, POC    Collection Time: 08/05/21  4:06 PM   Result Value Ref Range    Glucose (POC) 91 65 - 117 mg/dL    Performed by Lesvia Sneed    BLOOD GAS, ARTERIAL    Collection Time: 08/06/21  4:00 AM   Result Value Ref Range    pH 7.53 (H) 7.35 - 7.45      PCO2 29 (L) 35 - 45 mmHg    PO2 107 (H) 75 - 100 mmHg    O2 SAT >100 >95 %    BICARBONATE 24 22 - 26 mmol/L    BASE EXCESS 1.7 0 - 2 mmol/L    O2 METHOD VENT      FIO2 50.0 %    MODE Assist Control/Volume Control      Tidal volume 500      SET RATE 10      IPAP/PIP 0      EPAP/CPAP/PEEP 5.0      SITE Right Radial      KRUNAL'S TEST PASS     RENAL FUNCTION PANEL    Collection Time: 08/06/21  4:14 AM   Result Value Ref Range    Sodium 141 136 - 145 mmol/L    Potassium 3.9 3.5 - 5.1 mmol/L    Chloride 110 (H) 97 - 108 mmol/L    CO2 24 21 - 32 mmol/L    Anion gap 7 5 - 15 mmol/L    Glucose 103 (H) 65 - 100 mg/dL    BUN 29 (H) 6 - 20 mg/dL    Creatinine 1.13 0.70 - 1.30 mg/dL    BUN/Creatinine ratio 26 (H) 12 - 20      GFR est AA >60 >60 ml/min/1.73m2    GFR est non-AA >60 >60 ml/min/1.73m2    Calcium 8.0 (L) 8.5 - 10.1 mg/dL    Phosphorus 2.9 2.6 - 4.7 mg/dL    Albumin 2.6 (L) 3.5 - 5.0 g/dL   MAGNESIUM    Collection Time: 08/06/21  4:14 AM   Result Value Ref Range    Magnesium 2.0 1.6 - 2.4 mg/dL   CBC WITH AUTOMATED DIFF    Collection Time: 08/06/21  4:14 AM   Result Value Ref Range    WBC 24.5 (H) 4.1 - 11.1 K/uL    RBC 4.34 4.10 - 5.70 M/uL    HGB 13.0 12.1 - 17.0 g/dL    HCT 38.8 36.6 - 50.3 %    MCV 89.4 80.0 - 99.0 FL    MCH 30.0 26.0 - 34.0 PG    MCHC 33.5 30.0 - 36.5 g/dL    RDW 13.2 11.5 - 14.5 %    PLATELET 450 (L) 966 - 400 K/uL    MPV 11.1 8.9 - 12.9 FL    NRBC 0.0 0.0  WBC    ABSOLUTE NRBC 0.00 0.00 - 0.01 K/uL    NEUTROPHILS 96 (H) 32 - 75 %    LYMPHOCYTES 1 (L) 12 - 49 %    MONOCYTES 3 (L) 5 - 13 %    EOSINOPHILS 0 0 - 7 %    BASOPHILS 0 0 - 1 %    IMMATURE GRANULOCYTES 1 (H) 0 - 0.5 %    ABS. NEUTROPHILS 23.1 (H) 1.8 - 8.0 K/UL    ABS. LYMPHOCYTES 0.3 (L) 0.8 - 3.5 K/UL    ABS. MONOCYTES 0.8 0.0 - 1.0 K/UL    ABS. EOSINOPHILS 0.0 0.0 - 0.4 K/UL    ABS. BASOPHILS 0.1 0.0 - 0.1 K/UL    ABS. IMM.  GRANS. 0.2 (H) 0.00 - 0.04 K/UL    DF AUTOMATED     TROPONIN I    Collection Time: 08/06/21  4:14 AM   Result Value Ref Range    Troponin-I, Qt. 1.61 (H) <8.84 ng/mL   METABOLIC PANEL, COMPREHENSIVE    Collection Time: 08/06/21  4:14 AM   Result Value Ref Range    Sodium 140 136 - 145 mmol/L    Potassium 3.9 3.5 - 5.1 mmol/L    Chloride 110 (H) 97 - 108 mmol/L    CO2 25 21 - 32 mmol/L    Anion gap 5 5 - 15 mmol/L    Glucose 102 (H) 65 - 100 mg/dL    BUN 29 (H) 6 - 20 mg/dL    Creatinine 1.15 0.70 - 1.30 mg/dL    BUN/Creatinine ratio 25 (H) 12 - 20      GFR est AA >60 >60 ml/min/1.73m2    GFR est non-AA >60 >60 ml/min/1.73m2    Calcium 8.0 (L) 8.5 - 10.1 mg/dL    Bilirubin, total 1.0 0.2 - 1.0 mg/dL    AST (SGOT) 107 (H) 15 - 37 U/L    ALT (SGPT) 61 12 - 78 U/L    Alk. phosphatase 41 (L) 45 - 117 U/L    Protein, total 6.3 (L) 6.4 - 8.2 g/dL    Albumin 2.6 (L) 3.5 - 5.0 g/dL    Globulin 3.7 2.0 - 4.0 g/dL    A-G Ratio 0.7 (L) 1.1 - 2.2       [unfilled]      Review of Systems    Constitutional: Negative for chills and fever. HENT: Negative. Eyes: Negative. Respiratory: Negative. Cardiovascular: Negative. Gastrointestinal: Negative for abdominal pain and nausea. Skin: Negative. Neurological: Negative. Physical Exam:      Constitutional: pt is oriented to person, place, and time. HENT:   Head: Normocephalic and atraumatic. Eyes: Pupils are equal, round, and reactive to light. EOM are normal.   Cardiovascular: Normal rate, regular rhythm and normal heart sounds. Pulmonary/Chest: Breath sounds normal. No wheezes. No rales. Exhibits no tenderness. Abdominal: Soft. Bowel sounds are normal. There is no abdominal tenderness. There is no rebound and no guarding. Musculoskeletal: Normal range of motion. Neurological: pt is alert and oriented to person, place, and time. XR CHEST PORT   Final Result      DUPLEX CAROTID BILATERAL   Final Result      XR CHEST PORT   Final Result      XR CHEST PORT   Final Result   ET tube placed, with tip 7 mm above the doretha. Asymmetric airspace   disease noted throughout the left lung, consistent with pneumonia, improved from   previous. Right lung clear.  Stable normal heart size. Calcified aortic knob. XR CHEST PORT   Final Result   New diffuse airspace opacity in the left lung. MRI BRAIN WO CONT   Final Result   1. Restricted diffusion involving the cerebellar hemispheres, nemesio, and   possibly the left midbrain most likely representing acute ischemic infarcts   although the brainstem involvement may be partially artifactual. Consider a   short-term interval follow-up MRI. 2.  Attenuated vertebrobasilar flow voids which may be related to underlying   stenosis. CTA or MRA is recommended for further evaluation. 3.  Extensive multifocal encephalomalacia as described above. Report called to the patient's ICU nurse, Zully Cordon at 5:45 PM on   8/4/2021 via phone conversation. XR CHEST PORT   Final Result   The cardiomediastinal silhouette is appropriate for age, technique,   and lung expansion. Pulmonary vasculature is not congested. The lungs are   essentially clear. No effusion or pneumothorax is seen. CT CODE NEURO HEAD WO CONTRAST   Final Result      No acute intracranial bleed. Bilateral areas of encephalomalacia. Atherosclerosis. A noncontrast head CT does not exclude the possibility of an   acute ischemic stroke. MRI is more sensitive for evaluation of brain parenchyma. Clinical correlation is recommended. Follow-up as clinically indicated. The   results were called and verbally communicated to Dr. Alexsander Frances on 8/4/2021 at   1:00 AM.      Dental disease. Please see full report.            Recent Results (from the past 24 hour(s))   ECHO ADULT COMPLETE    Collection Time: 08/05/21  9:46 AM   Result Value Ref Range    LV ED Vol A4C 44.00 cm3    LV ED Vol A2C 71.00 cm3    LV ES Vol A4C 27.00 cm3    LV ES Vol A2C 24.90 cm3    IVSd 0.83 0.60 - 1.00 cm    LVIDd 4.14 (A) 4.20 - 5.90 cm    LVIDs 2.92 cm    LVOT d 1.90 cm    Left Ventricular Outflow Tract Mean Gradient 3.00 mmHg    LVOT VTI 16.00 cm    LVOT VTI 16.00 cm    LVOT Peak Velocity 114.00 cm/s    LVOT Peak Gradient 5.00 mmHg    LVPWd 0.91 0.60 - 1.00 cm    LV E' Septal Velocity 5.56 cm/s    E/E' septal 13.97     LVOT SV 45.0 cm3    Aortic Valve Systolic Mean Gradient 0.34 mmHg    AoV VTI 12.10 cm    Aortic valve mean velocity 80.40 cm/s    Aortic Valve Systolic Peak Velocity 546.60 cm/s    AoV PG 5.00 mmHg    Aortic Valve Area by Continuity of VTI 3.76 cm2    Aortic Valve Area by Continuity of Peak Velocity 2.89 cm2    Ao Root D 2.70 cm    AO ASC D 2.80 cm    Mitral Valve Max Velocity 145.00 cm/s    MV Peak Gradient 8.00 mmHg    Mitral Valve Deceleration [place] 9,500.00 mm/s2    Mitral Valve Deceleration [place] 9,500.00 mm/s2    Mitral Valve Pressure Half-time 24.00 ms    MV A Aaron 125.00 cm/s    MV E Aaron 77.70 cm/s    MV Mean Gradient 4.00 mmHg    Mitral Valve Annulus Velocity Time Integral 22.40 cm    MVA (PHT) 9.17 cm2    MV E/A 0.62     Est. RA Pressure 5.00 mmHg    Tricuspid Valve Max Velocity 99.90 cm/s    Triscuspid Valve Regurgitation Peak Gradient 4.00 mmHg    RVSP 9.00 mmHg    LV Mass .7 88.0 - 224.0 g    LV Mass AL Index 64.0 49.0 - 115.0 g/m2    NANI/BSA Pk Aaron 1.7 cm2/m2    NANI/BSA VTI 2.2 cm2/m2   DUPLEX CAROTID BILATERAL    Collection Time: 08/05/21  1:20 PM   Result Value Ref Range    Left CCA dist sys 114.0 cm/s    Left CCA dist carreon 19.3 cm/s    Left CCA prox sys 156.0 cm/s    Left CCA prox carreon 27.9 cm/s    Left ECA sys 82.6 cm/s    LEFT EXTERNAL CAROTID ARTERY D 7.84 cm/s    Left ICA prox sys 96.9 cm/s    Left ICA prox carreon 23.4 cm/s    Left subclavian sys 145.0 cm/s    LEFT SUBCLAVIAN ARTERY D 26.90 cm/s    Left vertebral sys 53.7 cm/s    LEFT VERTEBRAL ARTERY D 6.45 cm/s    Right cca dist sys 121.0 cm/s    Right CCA dist carreon 23.7 cm/s    Right CCA prox sys 117.0 cm/s    Right CCA prox carreon 19.1 cm/s    Right ICA dist sys 68.5 cm/s    Right ICA dist carreon 16.1 cm/s    Right ICA prox sys 52.9 cm/s    Right ICA prox carreon 8.2 cm/s    Right eca sys 94.0 cm/s RIGHT EXTERNAL CAROTID ARTERY D 14.90 cm/s    Right subclavian sys 98.5 cm/s    RIGHT SUBCLAVIAN ARTERY D 0.00 cm/s    Right vertebral sys 44.9 cm/s    RIGHT VERTEBRAL ARTERY D 9.55 cm/s   GLUCOSE, POC    Collection Time: 08/05/21  4:06 PM   Result Value Ref Range    Glucose (POC) 91 65 - 117 mg/dL    Performed by Radha Arnett GAS, ARTERIAL    Collection Time: 08/06/21  4:00 AM   Result Value Ref Range    pH 7.53 (H) 7.35 - 7.45      PCO2 29 (L) 35 - 45 mmHg    PO2 107 (H) 75 - 100 mmHg    O2 SAT >100 >95 %    BICARBONATE 24 22 - 26 mmol/L    BASE EXCESS 1.7 0 - 2 mmol/L    O2 METHOD VENT      FIO2 50.0 %    MODE Assist Control/Volume Control      Tidal volume 500      SET RATE 10      IPAP/PIP 0      EPAP/CPAP/PEEP 5.0      SITE Right Radial      KRUNAL'S TEST PASS     RENAL FUNCTION PANEL    Collection Time: 08/06/21  4:14 AM   Result Value Ref Range    Sodium 141 136 - 145 mmol/L    Potassium 3.9 3.5 - 5.1 mmol/L    Chloride 110 (H) 97 - 108 mmol/L    CO2 24 21 - 32 mmol/L    Anion gap 7 5 - 15 mmol/L    Glucose 103 (H) 65 - 100 mg/dL    BUN 29 (H) 6 - 20 mg/dL    Creatinine 1.13 0.70 - 1.30 mg/dL    BUN/Creatinine ratio 26 (H) 12 - 20      GFR est AA >60 >60 ml/min/1.73m2    GFR est non-AA >60 >60 ml/min/1.73m2    Calcium 8.0 (L) 8.5 - 10.1 mg/dL    Phosphorus 2.9 2.6 - 4.7 mg/dL    Albumin 2.6 (L) 3.5 - 5.0 g/dL   MAGNESIUM    Collection Time: 08/06/21  4:14 AM   Result Value Ref Range    Magnesium 2.0 1.6 - 2.4 mg/dL   CBC WITH AUTOMATED DIFF    Collection Time: 08/06/21  4:14 AM   Result Value Ref Range    WBC 24.5 (H) 4.1 - 11.1 K/uL    RBC 4.34 4.10 - 5.70 M/uL    HGB 13.0 12.1 - 17.0 g/dL    HCT 38.8 36.6 - 50.3 %    MCV 89.4 80.0 - 99.0 FL    MCH 30.0 26.0 - 34.0 PG    MCHC 33.5 30.0 - 36.5 g/dL    RDW 13.2 11.5 - 14.5 %    PLATELET 778 (L) 247 - 400 K/uL    MPV 11.1 8.9 - 12.9 FL    NRBC 0.0 0.0  WBC    ABSOLUTE NRBC 0.00 0.00 - 0.01 K/uL    NEUTROPHILS 96 (H) 32 - 75 % LYMPHOCYTES 1 (L) 12 - 49 %    MONOCYTES 3 (L) 5 - 13 %    EOSINOPHILS 0 0 - 7 %    BASOPHILS 0 0 - 1 %    IMMATURE GRANULOCYTES 1 (H) 0 - 0.5 %    ABS. NEUTROPHILS 23.1 (H) 1.8 - 8.0 K/UL    ABS. LYMPHOCYTES 0.3 (L) 0.8 - 3.5 K/UL    ABS. MONOCYTES 0.8 0.0 - 1.0 K/UL    ABS. EOSINOPHILS 0.0 0.0 - 0.4 K/UL    ABS. BASOPHILS 0.1 0.0 - 0.1 K/UL    ABS. IMM. GRANS. 0.2 (H) 0.00 - 0.04 K/UL    DF AUTOMATED     TROPONIN I    Collection Time: 08/06/21  4:14 AM   Result Value Ref Range    Troponin-I, Qt. 1.61 (H) <5.96 ng/mL   METABOLIC PANEL, COMPREHENSIVE    Collection Time: 08/06/21  4:14 AM   Result Value Ref Range    Sodium 140 136 - 145 mmol/L    Potassium 3.9 3.5 - 5.1 mmol/L    Chloride 110 (H) 97 - 108 mmol/L    CO2 25 21 - 32 mmol/L    Anion gap 5 5 - 15 mmol/L    Glucose 102 (H) 65 - 100 mg/dL    BUN 29 (H) 6 - 20 mg/dL    Creatinine 1.15 0.70 - 1.30 mg/dL    BUN/Creatinine ratio 25 (H) 12 - 20      GFR est AA >60 >60 ml/min/1.73m2    GFR est non-AA >60 >60 ml/min/1.73m2    Calcium 8.0 (L) 8.5 - 10.1 mg/dL    Bilirubin, total 1.0 0.2 - 1.0 mg/dL    AST (SGOT) 107 (H) 15 - 37 U/L    ALT (SGPT) 61 12 - 78 U/L    Alk.  phosphatase 41 (L) 45 - 117 U/L    Protein, total 6.3 (L) 6.4 - 8.2 g/dL    Albumin 2.6 (L) 3.5 - 5.0 g/dL    Globulin 3.7 2.0 - 4.0 g/dL    A-G Ratio 0.7 (L) 1.1 - 2.2         Results     Procedure Component Value Units Date/Time    CULTURE, BLOOD, LINE [[id number]] Collected: 08/05/21 0102    Order Status: Completed Specimen: Blood Updated: 08/05/21 0118    CULTURE, RESPIRATORY/SPUTUM/BRONCH Elsworth Altman STAIN [[id number]] Collected: 08/04/21 2230    Order Status: Completed Specimen: Sputum Updated: 08/05/21 1404     Special Requests: No Special Requests        GRAM STAIN Rare WBCs seen         Rare EPITHELIAL CELLS               Occasional Gram Positive Cocci in clusters                  Occasional Gram Negative Rods           Culture result: PENDING    CULTURE, URINE [[id number]] Collected: 08/04/21 1845 Order Status: Completed Specimen: Urine Updated: 08/06/21 0729     Special Requests: No Special Requests        Culture result: No Growth (<1000 cfu/mL)       MRSA SCREEN - PCR (NASAL) [[id number]] Collected: 08/04/21 1841    Order Status: Completed Specimen: Swab Updated: 08/05/21 0027     MRSA by PCR, Nasal Not Detected       CULTURE, URINE [[id number]] Collected: 08/04/21 1100    Order Status: Completed Specimen: Urine Updated: 08/05/21 1416     Special Requests: No Special Requests        Culture result:       No significant growth, <10,000 CFU/mL          CULTURE, BLOOD #2 [[id number]] Collected: 08/04/21 1025    Order Status: Completed Specimen: Blood Updated: 08/06/21 0735     Special Requests: No Special Requests        Culture result: No growth 2 days       CULTURE, BLOOD #1 [[id number]] Collected: 08/04/21 1020    Order Status: Completed Specimen: Blood Updated: 08/06/21 0735     Special Requests: No Special Requests        Culture result: No growth 2 days              Labs:     Recent Labs     08/06/21 0414 08/05/21  0415   WBC 24.5* 13.3*   HGB 13.0 14.5   HCT 38.8 43.4   * 176     Recent Labs     08/06/21 0414 08/05/21  0102 08/04/21  0130     140 137  136 137   K 3.9  3.9 3.9  3.8 3.8   *  110* 108  107 104   CO2 24  25 19*  23 29   BUN 29*  29* 22*  22* 24*   CREA 1.13  1.15 1.32*  1.29 0.78   *  102* 119*  125* 99   CA 8.0*  8.0* 6.1*  8.3* 8.6   MG 2.0 1.9  --    PHOS 2.9 1.4*  --      Recent Labs     08/06/21 0414 08/05/21  0102 08/04/21  0130   ALT 61 <6* 33   AP 41* 48 70   TBILI 1.0 1.6* 0.5   TP 6.3* 6.6 7.9   ALB 2.6*  2.6* 2.7*  2.7* 3.4*   GLOB 3.7 3.9 4.5*     Recent Labs     08/04/21  0440   INR 1.1   PTP 14.0   APTT 24.7      No results for input(s): FE, TIBC, PSAT, FERR in the last 72 hours.    No results found for: FOL, RBCF   Recent Labs     08/06/21  0400 08/05/21  0400   PH 7.53* 7.45   PCO2 29* 37   PO2 107* 134*     Recent Labs 08/06/21  0414 08/05/21  0102 08/04/21  1925   TROIQ 1.61* 1.38* 0.38*     No results found for: CHOL, CHOLX, CHLST, CHOLV, HDL, HDLP, LDL, LDLC, DLDLP, TGLX, TRIGL, TRIGP, CHHD, CHHDX  Lab Results   Component Value Date/Time    Glucose (POC) 91 08/05/2021 04:06 PM    Glucose (POC) 96 08/04/2021 05:10 PM    Glucose (POC) 122 (H) 08/04/2021 08:15 AM    Glucose (POC) 104 08/04/2021 12:53 AM     Lab Results   Component Value Date/Time    Color Yellow/Straw 08/04/2021 06:41 PM    Appearance Clear 08/04/2021 06:41 PM    Specific gravity 1.026 08/04/2021 06:41 PM    pH (UA) 6.0 08/04/2021 06:41 PM    Protein 30 (A) 08/04/2021 06:41 PM    Glucose 50 (A) 08/04/2021 06:41 PM    Ketone 5 (A) 08/04/2021 06:41 PM    Bilirubin Negative 08/04/2021 06:41 PM    Urobilinogen 4.0 (H) 08/04/2021 06:41 PM    Nitrites Negative 08/04/2021 06:41 PM    Leukocyte Esterase Negative 08/04/2021 06:41 PM    Bacteria Negative 08/04/2021 06:41 PM    WBC 0-4 08/04/2021 06:41 PM    RBC 0-5 08/04/2021 06:41 PM         Assessment:   Acute Brainstem and cerebellar infarct  Acute hypoxemic respiratory failure intubated  Aspiration pneumonia  Sepsis  History of CVA  History of seizures  Acute kidney injury        Plan:   Patient in ICU on ventilator 55% O2  On propofol drip for sedation  On Keppra 500 IV every 12 hours for seizures  Start on IV Zosyn for aspiration pneumonia  Patient on Levophed drip for hypotension  On aspirin and Lipitor     Continue IV fluids  2D echo carotid Dopplers pending  We will place NG tube and start feeding  Dietitian consult     Overall prognosis poor  Will discuss with the patient family today again          Current Facility-Administered Medications:     zinc oxide-white petrolatum 17-57 % topical paste, , Topical, TID, Lupis Taylor MD, Given at 08/05/21 2202    levETIRAcetam (KEPPRA) 500 mg in saline (iso-osm) 100 mL IVPB (premix), 500 mg, IntraVENous, Q12H, Lupis Taylor MD, 500 mg at 08/06/21 0608   metoclopramide HCl (REGLAN) injection 10 mg, 10 mg, IntraVENous, Q4H PRN, Mohiuddin, Laban Mcardle, MD    0.9% sodium chloride infusion, 100 mL/hr, IntraVENous, CONTINUOUS, Lupis Taylor MD, Last Rate: 100 mL/hr at 08/06/21 0813, 100 mL/hr at 08/06/21 0813    acetaminophen (TYLENOL) tablet 650 mg, 650 mg, Oral, Q6H PRN, 650 mg at 08/05/21 1522 **OR** acetaminophen (TYLENOL) suppository 650 mg, 650 mg, Rectal, Q6H PRN, Mohiuddin, Laban Mcardle, MD    polyethylene glycol (MIRALAX) packet 17 g, 17 g, Oral, DAILY PRN, Mohiuddin, Laban Mcardle, MD    ondansetron (ZOFRAN ODT) tablet 4 mg, 4 mg, Oral, Q8H PRN **OR** ondansetron (ZOFRAN) injection 4 mg, 4 mg, IntraVENous, Q6H PRN, Lupis Taylor MD    enoxaparin (LOVENOX) injection 40 mg, 40 mg, SubCUTAneous, DAILY, Lupis Taylor MD, 40 mg at 08/05/21 0800    aspirin tablet 325 mg, 325 mg, Oral, DAILY, Lupis Taylor MD, 325 mg at 08/05/21 0800    atorvastatin (LIPITOR) tablet 40 mg, 40 mg, Oral, QHS, Lupis Taylor MD, 40 mg at 08/05/21 2202    piperacillin-tazobactam (ZOSYN) 3.375 g in 0.9% sodium chloride (MBP/ADV) 100 mL MBP, 3.375 g, IntraVENous, Q8H, Lupis Taylor MD, Last Rate: 200 mL/hr at 08/06/21 0406, 3.375 g at 08/06/21 0406    propofol (DIPRIVAN) 10 mg/mL infusion, 0-50 mcg/kg/min, IntraVENous, TITRATE, Lesly Franco MD, Last Rate: 3.9 mL/hr at 08/05/21 1749, 10 mcg/kg/min at 08/05/21 1749    dexamethasone (DECADRON) 4 mg/mL injection 4 mg, 4 mg, IntraVENous, Q6H, Lesly Franco MD, 4 mg at 08/06/21 0608    NOREPINephrine (LEVOPHED) 8 mg in 5% dextrose 250mL (32 mcg/mL) infusion, 10 mcg/min, IntraVENous, TITRATE, Mohiuddin, Laban Mcardle, MD, Stopped at 08/05/21 1111                                                                                                *ATTENTION:  This note has been created by a medical student for educational purposes only. Please do not refer to the content of this note for clinical decision-making, billing, or other purposes. Please see attending physicians note to obtain clinical information on this patient. *

## 2021-08-06 NOTE — PROGRESS NOTES
Consult  Pulmonary, Critical Care    Name: Sp Cowan MRN: [id number]   : 1955 Hospital: [facility]   Date: 2021  Admission date: 2021 Hospital Day: 3       Subjective/Interval History:   Patient admitted last night with altered mental status has remained unresponsive today. Some notes mention that he had vomiting at home before admission. His admission chest x-ray was relatively clear. He did go to MRI today came back to the floor Dr. Dariel Velasquez saw him at that time he had agonal respirations and anesthesia was called to intubate him. Chest x-ray shows extensive new left-sided infiltrate he is currently on the ventilator has been transferred to the ICU. Dr. Dariel Velasquez asked for me to see him for vent management     remains on the ventilator mildly sedated on propofol 10 mics. He has spontaneous respirations    Patient Active Problem List   Diagnosis Code    Altered mental status R41.82    Dysarthria R47.1    AMS (altered mental status) R41.82       IMPRESSION:   1. Acute hypoxic respiratory failure  2. Aspiration pneumonia extensive left lung pneumonia improved on x-ray  3. Hypotension questionably sepsis currently off norepinephrine  4. Extensive acute CVA  cerebellar and brainstem seen on MRI tiffany unresponsive  5. Acute kidney injury creatinine mildly improved  6. Elevated troponin probably acute myocardial infarct from anoxia  7. Hypophosphatemia repleted  8. Hypocalcemia pleated  9. History seizure disorder  10. History intracranial aneurysm repair years ago  6. Chronic left hemiparesis  Body mass index is 24.13 kg/m². 12.       RECOMMENDATIONS/PLAN:   1. Have decreased baseline ventilatory rate and he has spontaneous respiration  2. Continue IV Zosyn  3.   4.   5. Continue IV fluids  6.   7.          Subjective/Initial History:       I was asked by Live Liao MD to see Sp Cowan a 76 y.o.    male  in consultation for a chief complaint of acute hypoxic respiratory failure aspiration pneumonia      The patient is unable to give any meaningful history or review of systems due to patient factors. Pt now in CCU. Patient PCP: None  PMH:  has a past medical history of CVA (cerebral vascular accident) ([facility] [address] and Seizure ([facility] [address] PSH:   has no past surgical history on file. FHX: family history is not on file. SHX:  reports that he has quit smoking. He has never used smokeless tobacco. He reports previous alcohol use. He reports that he does not use drugs.     Systemic review unobtainable as patient is unresponsive on the ventilator    No Known Allergies   MEDS:   Current Facility-Administered Medications   Medication    zinc oxide-white petrolatum 17-57 % topical paste    levETIRAcetam (KEPPRA) 500 mg in saline (iso-osm) 100 mL IVPB (premix)    metoclopramide HCl (REGLAN) injection 10 mg    0.9% sodium chloride infusion    acetaminophen (TYLENOL) tablet 650 mg    Or    acetaminophen (TYLENOL) suppository 650 mg    polyethylene glycol (MIRALAX) packet 17 g    ondansetron (ZOFRAN ODT) tablet 4 mg    Or    ondansetron (ZOFRAN) injection 4 mg    enoxaparin (LOVENOX) injection 40 mg    aspirin tablet 325 mg    atorvastatin (LIPITOR) tablet 40 mg    piperacillin-tazobactam (ZOSYN) 3.375 g in 0.9% sodium chloride (MBP/ADV) 100 mL MBP    propofol (DIPRIVAN) 10 mg/mL infusion    dexamethasone (DECADRON) 4 mg/mL injection 4 mg    NOREPINephrine (LEVOPHED) 8 mg in 5% dextrose 250mL (32 mcg/mL) infusion        Current Facility-Administered Medications:     zinc oxide-white petrolatum 17-57 % topical paste, , Topical, TID, Lupis Taylor MD, Given at 08/05/21 2202    levETIRAcetam (KEPPRA) 500 mg in saline (iso-osm) 100 mL IVPB (premix), 500 mg, IntraVENous, Q12H, Lupis Taylor MD, 500 mg at 08/06/21 0608    metoclopramide HCl (REGLAN) injection 10 mg, 10 mg, IntraVENous, Q4H PRN, Mohiuddin, Curtis Halsted, MD    0.9% sodium chloride infusion, 100 mL/hr, IntraVENous, CONTINUOUS, Lupis Taylor MD, Last Rate: 100 mL/hr at 21 0813, 100 mL/hr at 21 0813    acetaminophen (TYLENOL) tablet 650 mg, 650 mg, Oral, Q6H PRN, 650 mg at 21 1522 **OR** acetaminophen (TYLENOL) suppository 650 mg, 650 mg, Rectal, Q6H PRN, Elvin Taylor MD    polyethylene glycol (MIRALAX) packet 17 g, 17 g, Oral, DAILY PRN, Elvin Taylor MD    ondansetron (ZOFRAN ODT) tablet 4 mg, 4 mg, Oral, Q8H PRN **OR** ondansetron (ZOFRAN) injection 4 mg, 4 mg, IntraVENous, Q6H PRN, Lupis Taylor MD    enoxaparin (LOVENOX) injection 40 mg, 40 mg, SubCUTAneous, DAILY, Lupis Taylor MD, 40 mg at 21 0800    aspirin tablet 325 mg, 325 mg, Oral, DAILY, Lupis Taylor MD, 325 mg at 21 0800    atorvastatin (LIPITOR) tablet 40 mg, 40 mg, Oral, QHS, Lupis Taylor MD, 40 mg at 21 2202    piperacillin-tazobactam (ZOSYN) 3.375 g in 0.9% sodium chloride (MBP/ADV) 100 mL MBP, 3.375 g, IntraVENous, Q8H, Lupis Taylor MD, Last Rate: 200 mL/hr at 21 0406, 3.375 g at 21 0406    propofol (DIPRIVAN) 10 mg/mL infusion, 0-50 mcg/kg/min, IntraVENous, TITRATE, Tita Tomlinson MD, Last Rate: 3.9 mL/hr at 21 1749, 10 mcg/kg/min at 21 1749    dexamethasone (DECADRON) 4 mg/mL injection 4 mg, 4 mg, IntraVENous, Q6H, Tita Tomlinson MD, 4 mg at 21 0608    NOREPINephrine (LEVOPHED) 8 mg in 5% dextrose 250mL (32 mcg/mL) infusion, 10 mcg/min, IntraVENous, TITRATE, Lupis Taylor MD, Stopped at 21 1111      Objective:     Vital Signs: Telemetry:    normal sinus rhythm Intake/Output:   Visit Vitals  /64 (BP 1 Location: Left lower arm, BP Patient Position: At rest)   Pulse 95   Temp (!) 101.4 °F (38.6 °C) Comment: RN Notified    Resp 19   Ht 5' 5\" (1.651 m)   Wt 65.8 kg (145 lb)   SpO2 99%   BMI 24.13 kg/m²       Temp (24hrs), Av.3 °F (37.9 °C), Min:98.8 °F (37.1 °C), Max:103.1 °F (39.5 °C)        O2 Device: Ventilator           Body mass index is 24.13 kg/m². Wt Readings from Last 4 Encounters:   08/04/21 65.8 kg (145 lb)          Intake/Output Summary (Last 24 hours) at 8/6/2021 0934  Last data filed at 8/6/2021 0700  Gross per 24 hour   Intake 400 ml   Output 925 ml   Net -525 ml       Last shift:      No intake/output data recorded. Last 3 shifts: 08/04 1901 - 08/06 0700  In: 400   Out: 1350 [Urine:1350]       Hemodynamics:    CO:    CI:    CVP:    SVR:   PAP Systolic:    PAP Diastolic:    PVR:    UI92:       Ventilator Settings:      Mode Rate TV Press PEEP FiO2 PIP Min. Vent   Assist control, Volume control    500 ml    5 cm H20 50 %  17 cm H2O  11.6 l/min      Physical Exam:    General:  male; unresponsive on the ventilator   HEENT: NCAT, ET tube in place  Eyes: anicteric; conjunctiva clear no doll's eye reflex eyes are disconjugate  Neck: no nodes, no cuff leak, no accessory MM use. No JVD  Chest: no deformity,   Cardiac: Regular rate and rhythm  Lungs: Has spontaneous respiration relatively clear right anterior lateral and posterior, better breath sounds over the left chest with posterior rales and a few rhonchi no wheezing  Abd: Soft a few bowel sounds no grimacing to palpation  Ext: no edema; no joint swelling; No clubbing  : clear urine  Neuro:  On the ventilator on propofol unresponsive no doll's eye reflex flaccid extremities  Psych-  unable to assess  Skin: warm, dry, no cyanosis;   Pulses: Brachial and radial pulses intact  Capillary: Normal capillary refill      Labs:    Recent Labs     08/06/21 0414 08/05/21  0415 08/04/21  0440 08/04/21  0130   WBC 24.5* 13.3*  --  12.5*   HGB 13.0 14.5  --  13.9   * 176  --  173   INR  --   --  1.1  --    APTT  --   --  24.7  --      Recent Labs     08/06/21  0414 08/05/21  0102 08/04/21  1925 08/04/21  0130     140 137  136  --  137   K 3.9  3.9 3.9  3.8  --  3.8   *  110* 108  107  --  104   CO2 24  25 19*  23  --  29   *  102* 119*  125*  --  99   BUN 29*  29* 22*  22*  --  24*   CREA 1.13  1.15 1.32*  1.29  --  0.78   CA 8.0*  8.0* 6.1*  8.3*  --  8.6   MG 2.0 1.9  --   --    PHOS 2.9 1.4*  --   --    LAC  --   --  5.5*  --    ALB 2.6*  2.6* 2.7*  2.7*  --  3.4*   ALT 61 <6*  --  33       Recent Labs     08/06/21  0414 08/05/21  0102 08/04/21  1925   TROIQ 1.61* 1.38* 0.38*       Lab Results   Component Value Date/Time    Culture result: PENDING 08/04/2021 10:30 PM    Culture result: No Growth (<1000 cfu/mL) 08/04/2021 06:41 PM    Culture result: No significant growth, <10,000 CFU/mL 08/04/2021 11:00 AM   Urine no growth  Sputum rare polys with gram-positive cocci and gram-negative rods  Blood no growth  Nasal MRSA smear negative    Imaging:  I have personally reviewed the patients radiographs and have reviewed the reports:    CXR Results  (Last 48 hours)  8/6 chest x-ray ET NG tube in place extensive left infiltrate mildly improved  8/4 chest x-ray after intubation shows ET tube a little low left lung reinflated extensive infiltrate persist  8/4 radiology has called MRI or results as acute cerebellar and brainstem infarct               08/04/21 1710  XR CHEST PORT Final result    Impression:  New diffuse airspace opacity in the left lung. Narrative:  Chest, frontal view, 8/4/2021       History: Shortness of breath. Comparison: Chest, same day. Findings: The cardiac silhouette is stable. The right lung is adequately   expanded and clear. The left lung has diffuse airspace opacity, new as compared   to study performed earlier the same day. Differential considerations include   aspiration, collapse secondary to mucous plugging. Clinical correlation is   recommended. Left pleural effusion is not excluded. No pneumothorax is   identified. Degenerative changes are present in the thoracic spine. 08/04/21 0853  XR CHEST PORT Final result    Impression:   The cardiomediastinal silhouette is appropriate for age, technique,   and lung expansion. Pulmonary vasculature is not congested. The lungs are   essentially clear. No effusion or pneumothorax is seen. Narrative:  1 view               Results from Hospital Encounter encounter on 08/04/21    XR CHEST PORT    Narrative  1 view comparison yesterday    ET and NG tube remain    Mild improvement of diffuse left-sided mixed disease. Right lung remains clear. No effusion or pneumothorax. Normal heart and mediastinum      XR CHEST PORT    Narrative  1 view comparison yesterday    NG tube is coiled in the upper stomach. ET tube remains    Little change in pronounced diffuse airspace disease on the left. Right lung  remains clear. No effusion or pneumothorax. Normal heart and mediastinum      XR CHEST PORT    Narrative  Portable chest, 1752 hours. Comparison with 8/4/2021 at 1705 hours. Impression  ET tube placed, with tip 7 mm above the doretha. Asymmetric airspace  disease noted throughout the left lung, consistent with pneumonia, improved from  previous. Right lung clear. Stable normal heart size. Calcified aortic knob. Results from [place] encounter on 08/04/21    CT CODE NEURO HEAD WO CONTRAST    Narrative  CT head without contrast    Dose reduction: All CT scans at this facility are performed using dose reduction  optimization techniques as appropriate to a performed exam including the  following: Automated exposure control, adjustments of the mA and/or kV according  to patient size, or use of iterative reconstruction technique. INDICATION: Code neuro    FINDINGS:    No acute intracranial bleed or midline shift. Areas of encephalomalacia in the  right hemisphere and left frontal lobe. Probable white matter small vessel  ischemic changes. Mild ex vacuo dilatation of right lateral ventricle. MRI is  more sensitive for evaluation of brain parenchyma. Atherosclerosis. No acute  skull fracture. Postoperative changes right skull. No fluid in the paranasal  sinuses or mastoid air cells. Caries and periapical infection/abscess involving  multiple teeth. Impression  No acute intracranial bleed. Bilateral areas of encephalomalacia. Atherosclerosis. A noncontrast head CT does not exclude the possibility of an  acute ischemic stroke. MRI is more sensitive for evaluation of brain parenchyma. Clinical correlation is recommended. Follow-up as clinically indicated. The  results were called and verbally communicated to Dr. Doroteo García on 8/4/2021 at  1:00 AM.    Dental disease. Please see full report. Discussion aspiration pneumonia with acute respiratory failure secondary to vomiting and brainstem and cerebellar infarct. Can add Decadron to try and prevent edema but it is not likely to help. 8/5 remains unresponsive does have some generalized movement when sternal rub. Renal function has deteriorated questionable ATN. Chest x-ray shows extensive infiltrate from aspiration. Troponin has elevated. MRI results as mentioned with new acute cerebellar infarcts and probable brainstem as well recovery seems unlikely  8/6 remains unresponsive on the ventilator on small dose of propofol to avoid cough on ventilator. He does have spontaneous respirations. Sputum does show gram negative zeke a few gram-positive cocci nasal MRSA smear is negative we will maintain Zosyn  Time of care 30 minutes           Thank you for allowing us to participate in the care of this patient.   We will follow along with you    Janae Baires MD

## 2021-08-07 NOTE — PROGRESS NOTES
Progress Note    Patient: Andrew Brasher MRN: [id number]  SSN: [id number]    YOB: 1955  Age: 76 y.o. Sex: male      Admit Date: 8/4/2021    LOS: 3 days     Subjective:     76years old patient with altered mental status, acute hypoxic respiratory failure, aspiration pneumonia, acute kidney injury intubated on FiO2 of 50%    Objective:     Vitals:    08/07/21 0806 08/07/21 0900 08/07/21 1000 08/07/21 1115   BP:  126/82 133/82    Pulse: 82 75 70 82   Resp: 19 21 20 26   Temp:   97.3 °F (36.3 °C)    SpO2: 100% 100% 100% 100%   Weight:       Height:            Intake and Output:  Current Shift: No intake/output data recorded. Last three shifts: 08/05 1901 - 08/07 0700  In: 2836.9 [I.V.:1431.9]  Out: 1700 [Urine:1700]    Physical Exam:   General:   Intubated. Eyes:  Conjunctivae/corneas clear. PERRL, EOMs intact. Fundi benign   Ears:  Normal TMs and external ear canals both ears. Nose: Nares normal. Septum midline. Mucosa normal. No drainage or sinus tenderness. Mouth/Throat: Lips, mucosa, and tongue normal. Teeth and gums normal.   Neck:  Intubated . Back:   Symmetric, no curvature. ROM normal. No CVA tenderness. Lungs:   diminished to auscultation bilaterally. Heart:  Regular rate and rhythm, S1, S2 normal, no murmur, click, rub or gallop. Abdomen:   Soft, non-tender. Bowel sounds normal. No masses,  No organomegaly. Extremities: Extremities intubated atraumatic, no cyanosis or edema. Pulses: 2+ and symmetric all extremities. Skin: Skin color, texture, turgor normal. No rashes or lesions   Lymph nodes: Cervical, supraclavicular, and axillary nodes normal.   Neurologic: CNII-XII intact. Intubated, sensation and reflexes throughout. Lab/Data Review: All lab results for the last 24 hours reviewed.      Recent Results (from the past 24 hour(s))   GLUCOSE, POC    Collection Time: 08/07/21 12:12 AM   Result Value Ref Range    Glucose (POC) 152 (H) 65 - 117 mg/dL [facility]    BLOOD GAS, ARTERIAL    Collection Time: 08/07/21  3:00 AM   Result Value Ref Range    pH 7.47 (H) 7.35 - 7.45      PCO2 33 (L) 35 - 45 mmHg    PO2 57 (L) 75 - 100 mmHg    O2 SAT 92 (L) >95 %    BICARBONATE 25 22 - 26 mmol/L    BASE EXCESS 0.5 0 - 2 mmol/L    O2 METHOD VENT      FIO2 50.0 %    MODE Assist Control/Volume Control      Tidal volume 450      SET RATE 5      EPAP/CPAP/PEEP 0      SITE Right Radial      KRUNAL'S TEST PASS     RENAL FUNCTION PANEL    Collection Time: 08/07/21  3:01 AM   Result Value Ref Range    Sodium 143 136 - 145 mmol/L    Potassium 3.8 3.5 - 5.1 mmol/L    Chloride 113 (H) 97 - 108 mmol/L    CO2 25 21 - 32 mmol/L    Anion gap 5 5 - 15 mmol/L    Glucose 123 (H) 65 - 100 mg/dL    BUN 24 (H) 6 - 20 mg/dL    Creatinine 0.77 0.70 - 1.30 mg/dL    BUN/Creatinine ratio 31 (H) 12 - 20      GFR est AA >60 >60 ml/min/1.73m2    GFR est non-AA >60 >60 ml/min/1.73m2    Calcium 7.8 (L) 8.5 - 10.1 mg/dL    Phosphorus 1.9 (L) 2.6 - 4.7 mg/dL    Albumin 2.2 (L) 3.5 - 5.0 g/dL   CBC WITH AUTOMATED DIFF    Collection Time: 08/07/21  3:01 AM   Result Value Ref Range    WBC 18.3 (H) 4.1 - 11.1 K/uL    RBC 4.12 4.10 - 5.70 M/uL    HGB 12.1 12.1 - 17.0 g/dL    HCT 36.7 36.6 - 50.3 %    MCV 89.1 80.0 - 99.0 FL    MCH 29.4 26.0 - 34.0 PG    MCHC 33.0 30.0 - 36.5 g/dL    RDW 13.1 11.5 - 14.5 %    PLATELET 003 (L) 150 - 400 K/uL    MPV 11.5 8.9 - 12.9 FL    NRBC 0.0 0.0  WBC    ABSOLUTE NRBC 0.00 0.00 - 0.01 K/uL    NEUTROPHILS 95 (H) 32 - 75 %    LYMPHOCYTES 1 (L) 12 - 49 %    MONOCYTES 2 (L) 5 - 13 %    EOSINOPHILS 1 0 - 7 %    BASOPHILS 0 0 - 1 %    IMMATURE GRANULOCYTES 1 (H) 0 - 0.5 %    ABS. NEUTROPHILS 17.4 (H) 1.8 - 8.0 K/UL    ABS. LYMPHOCYTES 0.3 (L) 0.8 - 3.5 K/UL    ABS. MONOCYTES 0.4 0.0 - 1.0 K/UL    ABS. EOSINOPHILS 0.1 0.0 - 0.4 K/UL    ABS. BASOPHILS 0.0 0.0 - 0.1 K/UL    ABS. IMM.  GRANS. 0.1 (H) 0.00 - 0.04 K/UL    DF AUTOMATED     METABOLIC PANEL, COMPREHENSIVE    Collection Time: 08/07/21  3:01 AM   Result Value Ref Range    Sodium 143 136 - 145 mmol/L    Potassium 3.8 3.5 - 5.1 mmol/L    Chloride 113 (H) 97 - 108 mmol/L    CO2 25 21 - 32 mmol/L    Anion gap 5 5 - 15 mmol/L    Glucose 123 (H) 65 - 100 mg/dL    BUN 23 (H) 6 - 20 mg/dL    Creatinine 0.80 0.70 - 1.30 mg/dL    BUN/Creatinine ratio 29 (H) 12 - 20      GFR est AA >60 >60 ml/min/1.73m2    GFR est non-AA >60 >60 ml/min/1.73m2    Calcium 7.8 (L) 8.5 - 10.1 mg/dL    Bilirubin, total 0.7 0.2 - 1.0 mg/dL    AST (SGOT) 131 (H) 15 - 37 U/L    ALT (SGPT) 79 (H) 12 - 78 U/L    Alk.  phosphatase 42 (L) 45 - 117 U/L    Protein, total 6.0 (L) 6.4 - 8.2 g/dL    Albumin 2.2 (L) 3.5 - 5.0 g/dL    Globulin 3.8 2.0 - 4.0 g/dL    A-G Ratio 0.6 (L) 1.1 - 2.2           Assessment:     Active Problems:    Altered mental status (8/4/2021)      Dysarthria (8/4/2021)      AMS (altered mental status) (8/4/2021)    Aspiration pneumonia  Acute kidney injury    Plan:     Continue present treatment    Signed By: Clifton Valdivia MD     August 7, 2021

## 2021-08-07 NOTE — PROGRESS NOTES
NEURO PROGRESS NOTE        SUBJECTIVE:   Mental status changes  Embolic infarcts to the posterior circulation      EXAM:  Unresponsive  No cranial nerve changes  No motor changes      ASSESSMENT/PLAN:  Prognosis is still potentially kilometers    ALLERGIES:    No Known Allergies    MEDS:      Current Facility-Administered Medications:     potassium, sodium phosphates (NEUTRA-PHOS) packet 2 Packet, 2 Packet, Oral, Q4H, Venkat Gonzalez MD, 2 Packet at 08/07/21 1208    glycopyrrolate (ROBINUL) tablet 1 mg, 1 mg, Oral, BID, Venkat Gonzalez MD, 1 mg at 08/07/21 6978    zinc oxide-white petrolatum 17-57 % topical paste, , Topical, TID, Alessandro Baker MD, Given at 08/07/21 0814    levETIRAcetam (KEPPRA) 500 mg in saline (iso-osm) 100 mL IVPB (premix), 500 mg, IntraVENous, Q12H, Lupis Taylor MD, 500 mg at 08/07/21 0600    metoclopramide HCl (REGLAN) injection 10 mg, 10 mg, IntraVENous, Q4H PRN, Rafa Taylor MD    0.9% sodium chloride infusion, 100 mL/hr, IntraVENous, CONTINUOUS, Lupis Taylor MD, Last Rate: 100 mL/hr at 08/07/21 1019, 100 mL/hr at 08/07/21 1019    acetaminophen (TYLENOL) tablet 650 mg, 650 mg, Oral, Q6H PRN, 650 mg at 08/06/21 2110 **OR** acetaminophen (TYLENOL) suppository 650 mg, 650 mg, Rectal, Q6H PRN, Rafa Taylor MD    polyethylene glycol (MIRALAX) packet 17 g, 17 g, Oral, DAILY PRN, Rafa Taylor MD    ondansetron (ZOFRAN ODT) tablet 4 mg, 4 mg, Oral, Q8H PRN **OR** ondansetron (ZOFRAN) injection 4 mg, 4 mg, IntraVENous, Q6H PRN, Lupis Taylor MD    enoxaparin (LOVENOX) injection 40 mg, 40 mg, SubCUTAneous, DAILY, Lupis Taylor MD, 40 mg at 08/07/21 0813    aspirin tablet 325 mg, 325 mg, Oral, DAILY, Lupis Taylor MD, 325 mg at 08/07/21 0813    atorvastatin (LIPITOR) tablet 40 mg, 40 mg, Oral, QHS, Lupis Taylor MD, 40 mg at 08/06/21 2110    piperacillin-tazobactam (ZOSYN) 3.375 g in 0.9% sodium chloride (MBP/ADV) 100 mL MBP, 3.375 g, IntraVENous, Q8H, Lupis Taylor MD, Last Rate: 200 mL/hr at 08/07/21 1202, 3.375 g at 08/07/21 1202    propofol (DIPRIVAN) 10 mg/mL infusion, 0-50 mcg/kg/min, IntraVENous, TITRATE, Leidy Cruz MD, Last Rate: 3.9 mL/hr at 08/07/21 1247, 10 mcg/kg/min at 08/07/21 1247    dexamethasone (DECADRON) 4 mg/mL injection 4 mg, 4 mg, IntraVENous, Q6H, Leidy Cruz MD, 4 mg at 08/07/21 1208    NOREPINephrine (LEVOPHED) 8 mg in 5% dextrose 250mL (32 mcg/mL) infusion, 10 mcg/min, IntraVENous, TITRATE, Lupis Taylor MD, Stopped at 08/05/21 1111    LABS:  Recent Results (from the past 24 hour(s))   GLUCOSE, POC    Collection Time: 08/07/21 12:12 AM   Result Value Ref Range    Glucose (POC) 152 (H) 65 - 117 mg/dL    Performed by Moo Mckeon    BLOOD GAS, ARTERIAL    Collection Time: 08/07/21  3:00 AM   Result Value Ref Range    pH 7.47 (H) 7.35 - 7.45      PCO2 33 (L) 35 - 45 mmHg    PO2 57 (L) 75 - 100 mmHg    O2 SAT 92 (L) >95 %    BICARBONATE 25 22 - 26 mmol/L    BASE EXCESS 0.5 0 - 2 mmol/L    O2 METHOD VENT      FIO2 50.0 %    MODE Assist Control/Volume Control      Tidal volume 450      SET RATE 5      EPAP/CPAP/PEEP 0      SITE Right Radial      KRUNAL'S TEST PASS     RENAL FUNCTION PANEL    Collection Time: 08/07/21  3:01 AM   Result Value Ref Range    Sodium 143 136 - 145 mmol/L    Potassium 3.8 3.5 - 5.1 mmol/L    Chloride 113 (H) 97 - 108 mmol/L    CO2 25 21 - 32 mmol/L    Anion gap 5 5 - 15 mmol/L    Glucose 123 (H) 65 - 100 mg/dL    BUN 24 (H) 6 - 20 mg/dL    Creatinine 0.77 0.70 - 1.30 mg/dL    BUN/Creatinine ratio 31 (H) 12 - 20      GFR est AA >60 >60 ml/min/1.73m2    GFR est non-AA >60 >60 ml/min/1.73m2    Calcium 7.8 (L) 8.5 - 10.1 mg/dL    Phosphorus 1.9 (L) 2.6 - 4.7 mg/dL    Albumin 2.2 (L) 3.5 - 5.0 g/dL   CBC WITH AUTOMATED DIFF    Collection Time: 08/07/21  3:01 AM   Result Value Ref Range    WBC 18.3 (H) 4.1 - 11.1 K/uL    RBC 4.12 4.10 - 5.70 M/uL    HGB 12.1 12.1 - 17.0 g/dL HCT 36.7 36.6 - 50.3 %    MCV 89.1 80.0 - 99.0 FL    MCH 29.4 26.0 - 34.0 PG    MCHC 33.0 30.0 - 36.5 g/dL    RDW 13.1 11.5 - 14.5 %    PLATELET 816 (L) 678 - 400 K/uL    MPV 11.5 8.9 - 12.9 FL    NRBC 0.0 0.0  WBC    ABSOLUTE NRBC 0.00 0.00 - 0.01 K/uL    NEUTROPHILS 95 (H) 32 - 75 %    LYMPHOCYTES 1 (L) 12 - 49 %    MONOCYTES 2 (L) 5 - 13 %    EOSINOPHILS 1 0 - 7 %    BASOPHILS 0 0 - 1 %    IMMATURE GRANULOCYTES 1 (H) 0 - 0.5 %    ABS. NEUTROPHILS 17.4 (H) 1.8 - 8.0 K/UL    ABS. LYMPHOCYTES 0.3 (L) 0.8 - 3.5 K/UL    ABS. MONOCYTES 0.4 0.0 - 1.0 K/UL    ABS. EOSINOPHILS 0.1 0.0 - 0.4 K/UL    ABS. BASOPHILS 0.0 0.0 - 0.1 K/UL    ABS. IMM. GRANS. 0.1 (H) 0.00 - 0.04 K/UL    DF AUTOMATED     METABOLIC PANEL, COMPREHENSIVE    Collection Time: 08/07/21  3:01 AM   Result Value Ref Range    Sodium 143 136 - 145 mmol/L    Potassium 3.8 3.5 - 5.1 mmol/L    Chloride 113 (H) 97 - 108 mmol/L    CO2 25 21 - 32 mmol/L    Anion gap 5 5 - 15 mmol/L    Glucose 123 (H) 65 - 100 mg/dL    BUN 23 (H) 6 - 20 mg/dL    Creatinine 0.80 0.70 - 1.30 mg/dL    BUN/Creatinine ratio 29 (H) 12 - 20      GFR est AA >60 >60 ml/min/1.73m2    GFR est non-AA >60 >60 ml/min/1.73m2    Calcium 7.8 (L) 8.5 - 10.1 mg/dL    Bilirubin, total 0.7 0.2 - 1.0 mg/dL    AST (SGOT) 131 (H) 15 - 37 U/L    ALT (SGPT) 79 (H) 12 - 78 U/L    Alk.  phosphatase 42 (L) 45 - 117 U/L    Protein, total 6.0 (L) 6.4 - 8.2 g/dL    Albumin 2.2 (L) 3.5 - 5.0 g/dL    Globulin 3.8 2.0 - 4.0 g/dL    A-G Ratio 0.6 (L) 1.1 - 2.2     GLUCOSE, POC    Collection Time: 08/07/21 12:02 PM   Result Value Ref Range    Glucose (POC) 110 65 - 117 mg/dL    Performed by EMILIE TOMAS        Visit Vitals  BP (!) 146/81 (BP 1 Location: Left upper arm, BP Patient Position: At rest)   Pulse 74   Temp 97.8 °F (36.6 °C)   Resp 16   Ht 5' 5\" (1.651 m)   Wt 65.8 kg (145 lb)   SpO2 100%   BMI 24.13 kg/m²       Imaging:  XR CHEST PORT   Final Result   FINDINGS/IMPRESSION:   Support lines and tubes are appropriate in radiographic position. Persistent airspace disease throughout the left lung, likely pneumonia. Cardiac   silhouette stable in size. Hemodynamic status stable. XR CHEST PORT   Final Result      DUPLEX CAROTID BILATERAL   Final Result      XR CHEST PORT   Final Result      XR CHEST PORT   Final Result   ET tube placed, with tip 7 mm above the doretha. Asymmetric airspace   disease noted throughout the left lung, consistent with pneumonia, improved from   previous. Right lung clear. Stable normal heart size. Calcified aortic knob. XR CHEST PORT   Final Result   New diffuse airspace opacity in the left lung. MRI BRAIN WO CONT   Final Result   1. Restricted diffusion involving the cerebellar hemispheres, nemesio, and   possibly the left midbrain most likely representing acute ischemic infarcts   although the brainstem involvement may be partially artifactual. Consider a   short-term interval follow-up MRI. 2.  Attenuated vertebrobasilar flow voids which may be related to underlying   stenosis. CTA or MRA is recommended for further evaluation. 3.  Extensive multifocal encephalomalacia as described above. Report called to the patient's ICU nurse, Octavio Cueto at 5:45 PM on   8/4/2021 via phone conversation. XR CHEST PORT   Final Result   The cardiomediastinal silhouette is appropriate for age, technique,   and lung expansion. Pulmonary vasculature is not congested. The lungs are   essentially clear. No effusion or pneumothorax is seen. CT CODE NEURO HEAD WO CONTRAST   Final Result      No acute intracranial bleed. Bilateral areas of encephalomalacia. Atherosclerosis. A noncontrast head CT does not exclude the possibility of an   acute ischemic stroke. MRI is more sensitive for evaluation of brain parenchyma. Clinical correlation is recommended. Follow-up as clinically indicated.  The   results were called and verbally communicated to Dr. Doroteo García on 8/4/2021 at   1:00 AM.      Dental disease. Please see full report.       XR CHEST PORT    (Results Pending)

## 2021-08-08 NOTE — PROGRESS NOTES
Consult  Pulmonary, Critical Care    Name: Vipul Barry MRN: [id number]   : 1955 Hospital: [address] [place] [address]   Date: 2021  Admission date: 2021 Hospital Day: 5       Subjective/Interval History:   Patient admitted last night with altered mental status has remained unresponsive today. Some notes mention that he had vomiting at home before admission. His admission chest x-ray was relatively clear. He did go to MRI today came back to the floor Dr. Rishabh Mosqueda saw him at that time he had agonal respirations and anesthesia was called to intubate him. Chest x-ray shows extensive new left-sided infiltrate he is currently on the ventilator has been transferred to the ICU. Dr. Rishabh Mosqueda asked for me to see him for vent management    8 remains on the ventilator mildly sedated on propofol 10 mics. He has spontaneous respirations  8/ remains on the ventilator reinstituted propofol due to cough and discoordination with the ventilator. He has self respirations   on the ventilator has self respirations remains on small dose propofol to avoid coughing against the ventilator    Patient Active Problem List   Diagnosis Code    Altered mental status R41.82    Dysarthria R47.1    AMS (altered mental status) R41.82       IMPRESSION:   1. Acute hypoxic respiratory failure remains on mechanical ventilator  2. Aspiration pneumonia extensive left lung improving radiographically  3. Hypotension questionably sepsis resolved  4. Extensive acute CVA  cerebellar and brainstem seen on MRI remains unresponsive  5. Acute kidney injury resolved creatinine now normal  6. Elevated troponin probably acute myocardial infarct from anoxia  7. Hypophosphatemia to be repleted  8. Hypocalcemia repleated  9. History seizure disorder  10. History intracranial aneurysm repair years ago  6. Chronic left hemiparesis  Body mass index is 30.05 kg/m². 12.       RECOMMENDATIONS/PLAN:   1.  Have decreased baseline ventilatory rate and he has spontaneous respiration  2. Continue IV Zosyn  3. Replace phosphorus  4.   5. Continue IV fluids  6.   7.          Subjective/Initial History:       I was asked by Michael Tamez MD to see Blanca Rodriges a 76 y.o.  male  in consultation for a chief complaint of acute hypoxic respiratory failure aspiration pneumonia      The patient is unable to give any meaningful history or review of systems due to patient factors. Pt now in CCU. Patient PCP: None  PMH:  has a past medical history of CVA (cerebral vascular accident) ([facility] [address] and Seizure ([facility] [address] PSH:   has no past surgical history on file. FHX: family history is not on file. SHX:  reports that he has quit smoking. He has never used smokeless tobacco. He reports previous alcohol use. He reports that he does not use drugs.     Systemic review unobtainable as patient is unresponsive on the ventilator    No Known Allergies   MEDS:   Current Facility-Administered Medications   Medication    glycopyrrolate (ROBINUL) tablet 1 mg    zinc oxide-white petrolatum 17-57 % topical paste    levETIRAcetam (KEPPRA) 500 mg in saline (iso-osm) 100 mL IVPB (premix)    metoclopramide HCl (REGLAN) injection 10 mg    0.9% sodium chloride infusion    acetaminophen (TYLENOL) tablet 650 mg    Or    acetaminophen (TYLENOL) suppository 650 mg    polyethylene glycol (MIRALAX) packet 17 g    ondansetron (ZOFRAN ODT) tablet 4 mg    Or    ondansetron (ZOFRAN) injection 4 mg    enoxaparin (LOVENOX) injection 40 mg    aspirin tablet 325 mg    atorvastatin (LIPITOR) tablet 40 mg    piperacillin-tazobactam (ZOSYN) 3.375 g in 0.9% sodium chloride (MBP/ADV) 100 mL MBP    propofol (DIPRIVAN) 10 mg/mL infusion    dexamethasone (DECADRON) 4 mg/mL injection 4 mg    NOREPINephrine (LEVOPHED) 8 mg in 5% dextrose 250mL (32 mcg/mL) infusion        Current Facility-Administered Medications:     glycopyrrolate (ROBINUL) tablet 1 mg, 1 mg, Oral, BID, Greg Connor MD, 1 mg at 08/08/21 7256    zinc oxide-white petrolatum 17-57 % topical paste, , Topical, TID, Lupis Taylor MD, Given at 08/08/21 0828    levETIRAcetam (KEPPRA) 500 mg in saline (iso-osm) 100 mL IVPB (premix), 500 mg, IntraVENous, Q12H, Lupis Taylor MD, 500 mg at 08/08/21 0612    metoclopramide HCl (REGLAN) injection 10 mg, 10 mg, IntraVENous, Q4H PRN, Mojgan Taylor MD    0.9% sodium chloride infusion, 100 mL/hr, IntraVENous, CONTINUOUS, Lupis Taylor MD, Last Rate: 100 mL/hr at 08/08/21 0359, 100 mL/hr at 08/08/21 0359    acetaminophen (TYLENOL) tablet 650 mg, 650 mg, Oral, Q6H PRN, 650 mg at 08/06/21 2110 **OR** acetaminophen (TYLENOL) suppository 650 mg, 650 mg, Rectal, Q6H PRN, Mojgan Taylor MD    polyethylene glycol (MIRALAX) packet 17 g, 17 g, Oral, DAILY PRN, Mojgan Taylor MD    ondansetron (ZOFRAN ODT) tablet 4 mg, 4 mg, Oral, Q8H PRN **OR** ondansetron (ZOFRAN) injection 4 mg, 4 mg, IntraVENous, Q6H PRN, Lupis Taylor MD    enoxaparin (LOVENOX) injection 40 mg, 40 mg, SubCUTAneous, DAILY, uLpis Taylor MD, 40 mg at 08/08/21 5466    aspirin tablet 325 mg, 325 mg, Oral, DAILY, Lupis Taylor MD, 325 mg at 08/08/21 0686    atorvastatin (LIPITOR) tablet 40 mg, 40 mg, Oral, QHS, Lupis Taylor MD, 40 mg at 08/07/21 2105    piperacillin-tazobactam (ZOSYN) 3.375 g in 0.9% sodium chloride (MBP/ADV) 100 mL MBP, 3.375 g, IntraVENous, Q8H, Lupis Taylor MD, Last Rate: 200 mL/hr at 08/08/21 0357, 3.375 g at 08/08/21 0357    propofol (DIPRIVAN) 10 mg/mL infusion, 0-50 mcg/kg/min, IntraVENous, TITRATE, Greg Connor MD, Last Rate: 9.9 mL/hr at 08/07/21 2105, 25 mcg/kg/min at 08/07/21 2105    dexamethasone (DECADRON) 4 mg/mL injection 4 mg, 4 mg, IntraVENous, Q6H, Greg Connor MD, 4 mg at 08/08/21 0828    NOREPINephrine (LEVOPHED) 8 mg in 5% dextrose 250mL (32 mcg/mL) infusion, 10 mcg/min, IntraVENous, TITRATE, Claudia, Jeremias Duff MD, Stopped at 21 1111      Objective:     Vital Signs: Telemetry:    normal sinus rhythm Intake/Output:   Visit Vitals  /86 (BP 1 Location: Left upper arm, BP Patient Position: At rest)   Pulse 91   Temp 99.6 °F (37.6 °C)   Resp 20   Ht 5' 5\" (1.651 m)   Wt 81.9 kg (180 lb 8.9 oz)   SpO2 99%   BMI 30.05 kg/m²       Temp (24hrs), Av.2 °F (36.8 °C), Min:97.4 °F (36.3 °C), Max:99.6 °F (37.6 °C)        O2 Device: Ventilator           Body mass index is 30.05 kg/m². Wt Readings from Last 4 Encounters:   21 81.9 kg (180 lb 8.9 oz)          Intake/Output Summary (Last 24 hours) at 2021 1046  Last data filed at 2021 0300  Gross per 24 hour   Intake 2579 ml   Output 650 ml   Net 1929 ml       Last shift:      No intake/output data recorded. Last 3 shifts:  1901 -  0700  In: 3733.4 [I.V.:1502.4]  Out: 1200 [Urine:1200]       Hemodynamics:    CO:    CI:    CVP:    SVR:   PAP Systolic:    PAP Diastolic:    PVR:    SL16:       Ventilator Settings:      Mode Rate TV Press PEEP FiO2 PIP Min. Vent   Assist control, Volume control    450 ml    5 cm H20 50 %  22 cm H2O  9.68 l/min      Physical Exam:    General:  male; unresponsive on the ventilator   HEENT: NCAT, ET tube in place  Eyes: anicteric; conjunctiva clear no doll's eye reflex eyes are disconjugate  Neck: no nodes, no cuff leak, no accessory MM use. No JVD  Chest: no deformity,   Cardiac: Regular rate and rhythm  Lungs: Has spontaneous respiration relatively clear right anterior lateral and posterior, better breath sounds over the left chest with posterior rales   Abd: Soft a few bowel sounds no grimacing to palpation  Ext: no edema; no joint swelling; No clubbing  : clear urine  Neuro:  On the ventilator on propofol unresponsive no doll's eye reflex flaccid extremities  Psych-  unable to assess  Skin: warm, dry, no cyanosis;   Pulses: Brachial and radial pulses intact  Capillary: Normal capillary refill      Labs:    Recent Labs     08/08/21  0500 08/07/21  0301 08/06/21  0414   WBC 18.2* 18.3* 24.5*   HGB 11.8* 12.1 13.0   * 128* 147*     Recent Labs     08/08/21  0500 08/07/21  0301 08/06/21  0414    143  143 141  140   K 4.1 3.8  3.8 3.9  3.9   * 113*  113* 110*  110*   CO2 26 25  25 24  25   * 123*  123* 103*  102*   BUN 21* 23*  24* 29*  29*   CREA 0.59* 0.80  0.77 1.13  1.15   CA 7.4* 7.8*  7.8* 8.0*  8.0*   MG  --   --  2.0   PHOS 1.9* 1.9* 2.9   ALB 2.0* 2.2*  2.2* 2.6*  2.6*   ALT  --  79* 61       Recent Labs     08/06/21  0414   TROIQ 1.61*       Lab Results   Component Value Date/Time    Culture result: No growth 2 days 08/05/2021 01:02 AM    Culture result: Light Normal respiratory jesus 08/04/2021 10:30 PM    Culture result: No Growth (<1000 cfu/mL) 08/04/2021 06:41 PM   Urine no growth  Sputum rare polys with normal jesus  Blood no growth  Nasal MRSA smear negative    Imaging:  I have personally reviewed the patients radiographs and have reviewed the reports:    CXR Results  (Last 48 hours)  8/7 chest x-ray ET and NG tube in place extensive left infiltrate with left base better inflation slowly clearing  8/6 chest x-ray ET NG tube in place extensive left infiltrate mildly improved  8/4 chest x-ray after intubation shows ET tube a little low left lung reinflated extensive infiltrate persist  8/4 radiology has called MRI or results as acute cerebellar and brainstem infarct               08/04/21 1710  XR CHEST PORT Final result    Impression:  New diffuse airspace opacity in the left lung. Narrative:  Chest, frontal view, 8/4/2021       History: Shortness of breath. Comparison: Chest, same day. Findings: The cardiac silhouette is stable. The right lung is adequately   expanded and clear. The left lung has diffuse airspace opacity, new as compared   to study performed earlier the same day.   Differential considerations include aspiration, collapse secondary to mucous plugging. Clinical correlation is   recommended. Left pleural effusion is not excluded. No pneumothorax is   identified. Degenerative changes are present in the thoracic spine. 08/04/21 0853  XR CHEST PORT Final result    Impression:  The cardiomediastinal silhouette is appropriate for age, technique,   and lung expansion. Pulmonary vasculature is not congested. The lungs are   essentially clear. No effusion or pneumothorax is seen. Narrative:  1 view               Results from Hospital Encounter encounter on 08/04/21    XR CHEST PORT    Narrative  XR CHEST PORT    Comparison:  8/7/2021    Single view:  Stable airspace disease in the right infrahilar lung and diffusely  within the left lung suspicious for pneumonia. No pneumothorax or large pleural  effusion apparent. The heart size is normal. Endotracheal tube tip in the  midthoracic trachea, unchanged. Enteric tube tip projects over the gastric  fundus, unchanged. Impression  No significant change. XR CHEST PORT    Narrative  XR CHEST PORT    Comparison: Chest radiograph dated August 6, 2021    Impression  FINDINGS/IMPRESSION:  Support lines and tubes are appropriate in radiographic position. Persistent airspace disease throughout the left lung, likely pneumonia. Cardiac  silhouette stable in size. Hemodynamic status stable. XR CHEST PORT    Narrative  1 view comparison yesterday    ET and NG tube remain    Mild improvement of diffuse left-sided mixed disease. Right lung remains clear. No effusion or pneumothorax.  Normal heart and mediastinum    Results from Hospital Encounter encounter on 08/04/21    CT CODE NEURO HEAD WO CONTRAST    Narrative  CT head without contrast    Dose reduction: All CT scans at this facility are performed using dose reduction  optimization techniques as appropriate to a performed exam including the  following: Automated exposure control, adjustments of the mA and/or kV according  to patient size, or use of iterative reconstruction technique. INDICATION: Code neuro    FINDINGS:    No acute intracranial bleed or midline shift. Areas of encephalomalacia in the  right hemisphere and left frontal lobe. Probable white matter small vessel  ischemic changes. Mild ex vacuo dilatation of right lateral ventricle. MRI is  more sensitive for evaluation of brain parenchyma. Atherosclerosis. No acute  skull fracture. Postoperative changes right skull. No fluid in the paranasal  sinuses or mastoid air cells. Caries and periapical infection/abscess involving  multiple teeth. Impression  No acute intracranial bleed. Bilateral areas of encephalomalacia. Atherosclerosis. A noncontrast head CT does not exclude the possibility of an  acute ischemic stroke. MRI is more sensitive for evaluation of brain parenchyma. Clinical correlation is recommended. Follow-up as clinically indicated. The  results were called and verbally communicated to Dr. David Foster on 8/4/2021 at  1:00 AM.    Dental disease. Please see full report. Discussion aspiration pneumonia with acute respiratory failure secondary to vomiting and brainstem and cerebellar infarct. Can add Decadron to try and prevent edema but it is not likely to help. 8/5 remains unresponsive does have some generalized movement when sternal rub. Renal function has deteriorated questionable ATN. Chest x-ray shows extensive infiltrate from aspiration. Troponin has elevated. MRI results as mentioned with new acute cerebellar infarcts and probable brainstem as well recovery seems unlikely  8/6 remains unresponsive on the ventilator on small dose of propofol to avoid cough on ventilator. He does have spontaneous respirations. Sputum does show gram negative zeke a few gram-positive cocci nasal MRSA smear is negative we will maintain Zosyn  8/7 remains unresponsive on the ventilator.   Propofol reintroduced at low rate as he was bucking the ventilator. No specific pathogen on sputum culture likely aspiration we will continue Zosyn phosphorus is low will replete  8/8 unchanged remains on the ventilator unresponsive continue propofol to avoid coughing against the ventilator. Phosphorus remains low. Creatinine normal will decrease IV fluids  Time of care 30 minutes           Thank you for allowing us to participate in the care of this patient.   We will follow along with you    Lucero Plaza MD

## 2021-08-08 NOTE — PROGRESS NOTES
Progress Note    Patient: Blanca Rodriges MRN: [id number]  SSN: [id number]    YOB: 1955  Age: 76 y.o. Sex: male      Admit Date: 8/4/2021    LOS: 4 days     Subjective:     76years old patient intubated admitted for aspiration pneumonia, acute positive respiratory failure acute kidney injury with altered mental status    Objective:     Vitals:    08/08/21 0700 08/08/21 0758 08/08/21 0800 08/08/21 0900   BP: 116/88  129/77 128/86   Pulse: 90 94 (!) 105 91   Resp: 28 18 22 20   Temp: 97.9 °F (36.6 °C)  99 °F (37.2 °C) 99.6 °F (37.6 °C)   SpO2: 100% 100% 100% 99%   Weight:       Height:            Intake and Output:  Current Shift: No intake/output data recorded. Last three shifts: 08/06 1901 - 08/08 0700  In: 3733.4 [I.V.:1502.4]  Out: 1200 [Urine:1200]    Physical Exam:   General:   Intubated   Eyes:     Ears:  intubated   Nose: Nares normal. Septum midline. Mucosa normal. No drainage or sinus tenderness. Mouth/Throat: Lips, mucosa, and tongue normal. Teeth and gums normal.   Neck: Supple, symmetrical, trachea midline, no adenopathy, thyroid: no enlargment/tenderness/nodules, no carotid bruit and no JVD. Back:   Symmetric, no curvature. ROM normal. No CVA tenderness. Lungs:   Clear to auscultation bilaterally. Heart:  Regular rate and rhythm, S1, S2 normal, no murmur, click, rub or gallop. Abdomen:   Soft, non-tender. Bowel sounds normal. No masses,  No organomegaly. Extremities: Extremities normal, atraumatic, no cyanosis or edema. Pulses: 2+ and symmetric all extremities. Skin: Skin color, texture, turgor normal. No rashes or lesions   Lymph nodes: Cervical, supraclavicular, and axillary nodes normal.   Neurologic:  Intubated        Lab/Data Review: All lab results for the last 24 hours reviewed.      Recent Results (from the past 24 hour(s))   GLUCOSE, POC    Collection Time: 08/07/21 12:02 PM   Result Value Ref Range    Glucose (POC) 110 65 - 117 mg/dL    Performed by EMILIE TOMAS    GLUCOSE, POC    Collection Time: 08/07/21  6:17 PM   Result Value Ref Range    Glucose (POC) 135 (H) 65 - 117 mg/dL    Performed by EMILIE TOMAS    GLUCOSE, POC    Collection Time: 08/08/21  4:09 AM   Result Value Ref Range    Glucose (POC) 135 (H) 65 - 117 mg/dL    Performed by Janneth Bryant    BLOOD GAS, ARTERIAL    Collection Time: 08/08/21  4:15 AM   Result Value Ref Range    pH 7.50 (H) 7.35 - 7.45      PCO2 32 (L) 35 - 45 mmHg    PO2 165 (H) 75 - 100 mmHg    O2 SAT >100 >95 %    BICARBONATE 25 22 - 26 mmol/L    BASE EXCESS 1.9 0 - 2 mmol/L    O2 METHOD VENT      FIO2 50.0 %    MODE Assist Control/Volume Control      Tidal volume 450      SET RATE 5      IPAP/PIP 0      EPAP/CPAP/PEEP 5.0      SITE Right Brachial      KRUNAL'S TEST PASS     RENAL FUNCTION PANEL    Collection Time: 08/08/21  5:00 AM   Result Value Ref Range    Sodium 142 136 - 145 mmol/L    Potassium 4.1 3.5 - 5.1 mmol/L    Chloride 111 (H) 97 - 108 mmol/L    CO2 26 21 - 32 mmol/L    Anion gap 5 5 - 15 mmol/L    Glucose 140 (H) 65 - 100 mg/dL    BUN 21 (H) 6 - 20 mg/dL    Creatinine 0.59 (L) 0.70 - 1.30 mg/dL    BUN/Creatinine ratio 36 (H) 12 - 20      GFR est AA >60 >60 ml/min/1.73m2    GFR est non-AA >60 >60 ml/min/1.73m2    Calcium 7.4 (L) 8.5 - 10.1 mg/dL    Phosphorus 1.9 (L) 2.6 - 4.7 mg/dL    Albumin 2.0 (L) 3.5 - 5.0 g/dL   CBC WITH AUTOMATED DIFF    Collection Time: 08/08/21  5:00 AM   Result Value Ref Range    WBC 18.2 (H) 4.1 - 11.1 K/uL    RBC 3.89 (L) 4.10 - 5.70 M/uL    HGB 11.8 (L) 12.1 - 17.0 g/dL    HCT 34.6 (L) 36.6 - 50.3 %    MCV 88.9 80.0 - 99.0 FL    MCH 30.3 26.0 - 34.0 PG    MCHC 34.1 30.0 - 36.5 g/dL    RDW 13.0 11.5 - 14.5 %    PLATELET 008 (L) 753 - 400 K/uL    MPV 12.3 8.9 - 12.9 FL    NRBC 0.0 0.0  WBC    ABSOLUTE NRBC 0.00 0.00 - 0.01 K/uL    NEUTROPHILS 96 (H) 32 - 75 %    LYMPHOCYTES 1 (L) 12 - 49 %    MONOCYTES 3 (L) 5 - 13 %    EOSINOPHILS 0 0 - 7 %    BASOPHILS 0 0 - 1 %    IMMATURE GRANULOCYTES 0 0 - 0.5 %    ABS. NEUTROPHILS 17.4 (H) 1.8 - 8.0 K/UL    ABS. LYMPHOCYTES 0.2 (L) 0.8 - 3.5 K/UL    ABS. MONOCYTES 0.5 0.0 - 1.0 K/UL    ABS. EOSINOPHILS 0.0 0.0 - 0.4 K/UL    ABS. BASOPHILS 0.0 0.0 - 0.1 K/UL    ABS. IMM.  GRANS. 0.1 (H) 0.00 - 0.04 K/UL    DF AUTOMATED           Assessment:     Active Problems:    Altered mental status (8/4/2021)      Dysarthria (8/4/2021)      AMS (altered mental status) (8/4/2021)    Acute hypoxic respiratory failure  Aspiration pneumonia  Metabolic encephalopathy    Plan:     Continue present treatment    Signed By: Kelsie Garcia MD     August 8, 2021

## 2021-08-08 NOTE — PROGRESS NOTES
NEURO PROGRESS NOTE        SUBJECTIVE    Embolic stroke to the posterior circulation  Status change  Respiratory failure  Other medical problems    EXAM:  Still on vent  Responsive to verbal stimulus  Corneals were sluggish  Oculocephalics sluggish  Oculovestibular is absent  Ciliospinals absent  Left spastic hemiparesis unchanged  Right-sided weakness unchanged      ASSESSMENT/PLAN:  Neurological evaluation and prognosis has not improved at this time    ALLERGIES:    No Known Allergies    MEDS:      Current Facility-Administered Medications:     potassium, sodium phosphates (NEUTRA-PHOS) packet 2 Packet, 2 Packet, Oral, Q4H, Lesly Franco MD, 2 Packet at 08/08/21 1134    glycopyrrolate (ROBINUL) tablet 1 mg, 1 mg, Oral, BID, Lesly Franco MD, 1 mg at 08/08/21 8504    zinc oxide-white petrolatum 17-57 % topical paste, , Topical, TID, Lupis Taylor MD, Given at 08/08/21 0828    levETIRAcetam (KEPPRA) 500 mg in saline (iso-osm) 100 mL IVPB (premix), 500 mg, IntraVENous, Q12H, Lupis Taylor MD, 500 mg at 08/08/21 0612    metoclopramide HCl (REGLAN) injection 10 mg, 10 mg, IntraVENous, Q4H PRN, Mohiuddin, Laban Mcardle, MD    0.9% sodium chloride infusion, 40 mL/hr, IntraVENous, CONTINUOUS, Lesly Franco MD, Last Rate: 40 mL/hr at 08/08/21 1054, 40 mL/hr at 08/08/21 1054    acetaminophen (TYLENOL) tablet 650 mg, 650 mg, Oral, Q6H PRN, 650 mg at 08/06/21 2110 **OR** acetaminophen (TYLENOL) suppository 650 mg, 650 mg, Rectal, Q6H PRN, Mohiuddin, Laban Mcardle, MD    polyethylene glycol (MIRALAX) packet 17 g, 17 g, Oral, DAILY PRN, Mohiuddin, Laban Mcardle, MD    ondansetron (ZOFRAN ODT) tablet 4 mg, 4 mg, Oral, Q8H PRN **OR** ondansetron (ZOFRAN) injection 4 mg, 4 mg, IntraVENous, Q6H PRN, Lupis Taylor MD    enoxaparin (LOVENOX) injection 40 mg, 40 mg, SubCUTAneous, DAILY, Lupis Taylor MD, 40 mg at 08/08/21 2982    aspirin tablet 325 mg, 325 mg, Oral, DAILY, Lupis Taylor MD, 325 mg at 08/08/21 7984   atorvastatin (LIPITOR) tablet 40 mg, 40 mg, Oral, QHS, Lupis Taylor MD, 40 mg at 08/07/21 2105    piperacillin-tazobactam (ZOSYN) 3.375 g in 0.9% sodium chloride (MBP/ADV) 100 mL MBP, 3.375 g, IntraVENous, Q8H, Lupis Taylor MD, Last Rate: 200 mL/hr at 08/08/21 0357, 3.375 g at 08/08/21 0357    propofol (DIPRIVAN) 10 mg/mL infusion, 0-50 mcg/kg/min, IntraVENous, TITRATE, Elvia Barker MD, Last Rate: 9.9 mL/hr at 08/07/21 2105, 25 mcg/kg/min at 08/07/21 2105    dexamethasone (DECADRON) 4 mg/mL injection 4 mg, 4 mg, IntraVENous, Q6H, Elvia Barker MD, 4 mg at 08/08/21 0828    NOREPINephrine (LEVOPHED) 8 mg in 5% dextrose 250mL (32 mcg/mL) infusion, 10 mcg/min, IntraVENous, TITRATE, Lupis Taylor MD, Stopped at 08/05/21 1111    LABS:  Recent Results (from the past 24 hour(s))   GLUCOSE, POC    Collection Time: 08/07/21  6:17 PM   Result Value Ref Range    Glucose (POC) 135 (H) 65 - 117 mg/dL    Performed by EMILIE TOMAS    GLUCOSE, POC    Collection Time: 08/08/21  4:09 AM   Result Value Ref Range    Glucose (POC) 135 (H) 65 - 117 mg/dL    Performed by Radha Patrick    BLOOD GAS, ARTERIAL    Collection Time: 08/08/21  4:15 AM   Result Value Ref Range    pH 7.50 (H) 7.35 - 7.45      PCO2 32 (L) 35 - 45 mmHg    PO2 165 (H) 75 - 100 mmHg    O2 SAT >100 >95 %    BICARBONATE 25 22 - 26 mmol/L    BASE EXCESS 1.9 0 - 2 mmol/L    O2 METHOD VENT      FIO2 50.0 %    MODE Assist Control/Volume Control      Tidal volume 450      SET RATE 5      IPAP/PIP 0      EPAP/CPAP/PEEP 5.0      SITE Right Brachial      KRUNAL'S TEST PASS     RENAL FUNCTION PANEL    Collection Time: 08/08/21  5:00 AM   Result Value Ref Range    Sodium 142 136 - 145 mmol/L    Potassium 4.1 3.5 - 5.1 mmol/L    Chloride 111 (H) 97 - 108 mmol/L    CO2 26 21 - 32 mmol/L    Anion gap 5 5 - 15 mmol/L    Glucose 140 (H) 65 - 100 mg/dL    BUN 21 (H) 6 - 20 mg/dL    Creatinine 0.59 (L) 0.70 - 1.30 mg/dL    BUN/Creatinine ratio 36 (H) 12 - 20 GFR est AA >60 >60 ml/min/1.73m2    GFR est non-AA >60 >60 ml/min/1.73m2    Calcium 7.4 (L) 8.5 - 10.1 mg/dL    Phosphorus 1.9 (L) 2.6 - 4.7 mg/dL    Albumin 2.0 (L) 3.5 - 5.0 g/dL   CBC WITH AUTOMATED DIFF    Collection Time: 08/08/21  5:00 AM   Result Value Ref Range    WBC 18.2 (H) 4.1 - 11.1 K/uL    RBC 3.89 (L) 4.10 - 5.70 M/uL    HGB 11.8 (L) 12.1 - 17.0 g/dL    HCT 34.6 (L) 36.6 - 50.3 %    MCV 88.9 80.0 - 99.0 FL    MCH 30.3 26.0 - 34.0 PG    MCHC 34.1 30.0 - 36.5 g/dL    RDW 13.0 11.5 - 14.5 %    PLATELET 115 (L) 352 - 400 K/uL    MPV 12.3 8.9 - 12.9 FL    NRBC 0.0 0.0  WBC    ABSOLUTE NRBC 0.00 0.00 - 0.01 K/uL    NEUTROPHILS 96 (H) 32 - 75 %    LYMPHOCYTES 1 (L) 12 - 49 %    MONOCYTES 3 (L) 5 - 13 %    EOSINOPHILS 0 0 - 7 %    BASOPHILS 0 0 - 1 %    IMMATURE GRANULOCYTES 0 0 - 0.5 %    ABS. NEUTROPHILS 17.4 (H) 1.8 - 8.0 K/UL    ABS. LYMPHOCYTES 0.2 (L) 0.8 - 3.5 K/UL    ABS. MONOCYTES 0.5 0.0 - 1.0 K/UL    ABS. EOSINOPHILS 0.0 0.0 - 0.4 K/UL    ABS. BASOPHILS 0.0 0.0 - 0.1 K/UL    ABS. IMM. GRANS. 0.1 (H) 0.00 - 0.04 K/UL    DF AUTOMATED     GLUCOSE, POC    Collection Time: 08/08/21 11:45 AM   Result Value Ref Range    Glucose (POC) 135 (H) 65 - 117 mg/dL    Performed by Omar Sierra        Visit Vitals  /83 (BP 1 Location: Left upper arm, BP Patient Position: At rest)   Pulse (!) 105   Temp (!) 100.7 °F (38.2 °C)   Resp 25   Ht 5' 5\" (1.651 m)   Wt 81.9 kg (180 lb 8.9 oz)   SpO2 100%   BMI 30.05 kg/m²       Imaging:  XR CHEST PORT   Final Result   No significant change. XR CHEST PORT   Final Result   FINDINGS/IMPRESSION:   Support lines and tubes are appropriate in radiographic position. Persistent airspace disease throughout the left lung, likely pneumonia. Cardiac   silhouette stable in size. Hemodynamic status stable.           XR CHEST PORT   Final Result      DUPLEX CAROTID BILATERAL   Final Result      XR CHEST PORT   Final Result      XR CHEST PORT   Final Result   ET tube placed, with tip 7 mm above the doretha. Asymmetric airspace   disease noted throughout the left lung, consistent with pneumonia, improved from   previous. Right lung clear. Stable normal heart size. Calcified aortic knob. XR CHEST PORT   Final Result   New diffuse airspace opacity in the left lung. MRI BRAIN WO CONT   Final Result   1. Restricted diffusion involving the cerebellar hemispheres, nemesio, and   possibly the left midbrain most likely representing acute ischemic infarcts   although the brainstem involvement may be partially artifactual. Consider a   short-term interval follow-up MRI. 2.  Attenuated vertebrobasilar flow voids which may be related to underlying   stenosis. CTA or MRA is recommended for further evaluation. 3.  Extensive multifocal encephalomalacia as described above. Report called to the patient's ICU nurse, Tu Staples at 5:45 PM on   8/4/2021 via phone conversation. XR CHEST PORT   Final Result   The cardiomediastinal silhouette is appropriate for age, technique,   and lung expansion. Pulmonary vasculature is not congested. The lungs are   essentially clear. No effusion or pneumothorax is seen. CT CODE NEURO HEAD WO CONTRAST   Final Result      No acute intracranial bleed. Bilateral areas of encephalomalacia. Atherosclerosis. A noncontrast head CT does not exclude the possibility of an   acute ischemic stroke. MRI is more sensitive for evaluation of brain parenchyma. Clinical correlation is recommended. Follow-up as clinically indicated. The   results were called and verbally communicated to Dr. Mayco Arias on 8/4/2021 at   1:00 AM.      Dental disease. Please see full report.       XR CHEST PORT    (Results Pending)

## 2021-08-09 NOTE — PROGRESS NOTES
/General Daily Progress Note          Patient Name:   Zelda Hare       YOB: 1955       Age:  76 y.o. Admit Date: 8/4/2021      Subjective:     Patient admitted five days ago with altered mental status has remained unresponsive.  Patient's son reports that the patient's baseline, he is walking and talking.  A follow-up MRI revealed acute infarction of cerebellar hemispheres, nemesio and midbrian.  Chest x-ray since admission shows extensive new left-sided infiltrate, he is currently on the ventilator. Per Pulmonology, has self respirations, but remains on small dose propofol to avoid coughing against the ventilator. He is in the CCU, with no changes in mentation today. He remains intubated ad on ventilator at 55% O2. CXR on 8/8/2021:  Comparison:  8/7/2021     Single view:  Stable airspace disease in the right infrahilar lung and diffusely  within the left lung suspicious for pneumonia. No pneumothorax or large pleural  effusion apparent. The heart size is normal. Endotracheal tube tip in the  midthoracic trachea, unchanged. Enteric tube tip projects over the gastric  fundus, unchanged.     Objective:     Visit Vitals  BP (!) 141/90 (BP 1 Location: Left upper arm, BP Patient Position: At rest)   Pulse 87   Temp 98.5 °F (36.9 °C)   Resp 29   Ht 5' 5\" (1.651 m)   Wt 81.4 kg (179 lb 7.3 oz)   SpO2 97%   BMI 29.86 kg/m²        Recent Results (from the past 24 hour(s))   GLUCOSE, POC    Collection Time: 08/08/21 11:45 AM   Result Value Ref Range    Glucose (POC) 135 (H) 65 - 117 mg/dL    Performed by [address] [place] [address], POC    Collection Time: 08/08/21  6:02 PM   Result Value Ref Range    Glucose (POC) 147 (H) 65 - 117 mg/dL    Performed by [address] [place] [address], POC    Collection Time: 08/08/21  8:41 PM   Result Value Ref Range    Glucose (POC) 130 (H) 65 - 117 mg/dL    Performed by [address] [place] [address] 17-M, ARTERIAL    Collection Time: 08/09/21  5:15 AM Result Value Ref Range    pH 7.55 (H) 7.35 - 7.45      PCO2 29 (L) 35 - 45 mmHg    PO2 107 (H) 75 - 100 mmHg    O2 SAT 98 >95 %    BICARBONATE 28 (H) 22 - 26 mmol/L    BASE EXCESS 3.6 (H) 0 - 2 mmol/L    O2 METHOD VENT      FIO2 40.0 %    MODE Assist Control/Volume Control      Tidal volume 450      SET RATE 5      EPAP/CPAP/PEEP 5.0      SITE Right Radial      KRUNAL'S TEST PASS       [unfilled]      Review of Systems    Limited secondary to altered mental status      Physical Exam:      Constitutional: pt is non-responsive  HENT:   Head: Normocephalic and atraumatic. Eyes: Pupils are equal, round, and reactive to light. EOM are normal.   Cardiovascular: Normal rate, regular rhythm and normal heart sounds. Pulmonary/Chest: Breath sounds normal. No wheezes. No rales. Abdominal: Soft. Bowel sounds are normal. There is no abdominal tenderness. There is no rebound and no guarding. Musculoskeletal: Normal range of motion. Neurological: pt is non-responsive, patient is posturing (decerebrate)    XR CHEST PORT   Final Result   No significant interval change. XR CHEST PORT   Final Result   No significant change. XR CHEST PORT   Final Result   FINDINGS/IMPRESSION:   Support lines and tubes are appropriate in radiographic position. Persistent airspace disease throughout the left lung, likely pneumonia. Cardiac   silhouette stable in size. Hemodynamic status stable. XR CHEST PORT   Final Result      DUPLEX CAROTID BILATERAL   Final Result      XR CHEST PORT   Final Result      XR CHEST PORT   Final Result   ET tube placed, with tip 7 mm above the doretha. Asymmetric airspace   disease noted throughout the left lung, consistent with pneumonia, improved from   previous. Right lung clear. Stable normal heart size. Calcified aortic knob. XR CHEST PORT   Final Result   New diffuse airspace opacity in the left lung. MRI BRAIN WO CONT   Final Result   1.   Restricted diffusion involving the cerebellar hemispheres, nemesio, and   possibly the left midbrain most likely representing acute ischemic infarcts   although the brainstem involvement may be partially artifactual. Consider a   short-term interval follow-up MRI. 2.  Attenuated vertebrobasilar flow voids which may be related to underlying   stenosis. CTA or MRA is recommended for further evaluation. 3.  Extensive multifocal encephalomalacia as described above. Report called to the patient's ICU nurse, Jerry Magana at 5:45 PM on   8/4/2021 via phone conversation. XR CHEST PORT   Final Result   The cardiomediastinal silhouette is appropriate for age, technique,   and lung expansion. Pulmonary vasculature is not congested. The lungs are   essentially clear. No effusion or pneumothorax is seen. CT CODE NEURO HEAD WO CONTRAST   Final Result      No acute intracranial bleed. Bilateral areas of encephalomalacia. Atherosclerosis. A noncontrast head CT does not exclude the possibility of an   acute ischemic stroke. MRI is more sensitive for evaluation of brain parenchyma. Clinical correlation is recommended. Follow-up as clinically indicated. The   results were called and verbally communicated to Dr. Asuncion Angel on 8/4/2021 at   1:00 AM.      Dental disease. Please see full report.       XR CHEST PORT    (Results Pending)        Recent Results (from the past 24 hour(s))   GLUCOSE, POC    Collection Time: 08/08/21 11:45 AM   Result Value Ref Range    Glucose (POC) 135 (H) 65 - 117 mg/dL    Performed by Mary Suarez, POC    Collection Time: 08/08/21  6:02 PM   Result Value Ref Range    Glucose (POC) 147 (H) 65 - 117 mg/dL    Performed by Mary Suarez, POC    Collection Time: 08/08/21  8:41 PM   Result Value Ref Range    Glucose (POC) 130 (H) 65 - 117 mg/dL    Performed by [id number], ARTERIAL    Collection Time: 08/09/21  5:15 AM   Result Value Ref Range    pH 7.55 (H) 7.35 - 7.45      PCO2 29 (L) 35 - 45 mmHg    PO2 107 (H) 75 - 100 mmHg    O2 SAT 98 >95 %    BICARBONATE 28 (H) 22 - 26 mmol/L    BASE EXCESS 3.6 (H) 0 - 2 mmol/L    O2 METHOD VENT      FIO2 40.0 %    MODE Assist Control/Volume Control      Tidal volume 450      SET RATE 5      EPAP/CPAP/PEEP 5.0      SITE Right Radial      KRUNAL'S TEST PASS         Results     Procedure Component Value Units Date/Time    CULTURE, BLOOD, LINE [[id number]] Collected: 08/05/21 0102    Order Status: Completed Specimen: Blood Updated: 08/09/21 0733     Special Requests: No Special Requests        Culture result: No growth 3 days       CULTURE, RESPIRATORY/SPUTUM/BRONCH Bessy Ee STAIN [[id number]] Collected: 08/04/21 2230    Order Status: Completed Specimen: Sputum Updated: 08/07/21 1134     Special Requests: No Special Requests        GRAM STAIN Rare WBCs seen         Rare EPITHELIAL CELLS               Occasional Gram Positive Cocci in clusters                  Occasional Gram Negative Rods           Culture result:       Light Normal respiratory jesus          CULTURE, URINE [[id number]] Collected: 08/04/21 1841    Order Status: Completed Specimen: Urine Updated: 08/06/21 0729     Special Requests: No Special Requests        Culture result: No Growth (<1000 cfu/mL)       MRSA SCREEN - PCR (NASAL) [[id number]] Collected: 08/04/21 1841    Order Status: Completed Specimen: Swab Updated: 08/05/21 0027     MRSA by PCR, Nasal Not Detected       CULTURE, URINE [[id number]] Collected: 08/04/21 1100    Order Status: Completed Specimen: Urine Updated: 08/05/21 1416     Special Requests: No Special Requests        Culture result:       No significant growth, <10,000 CFU/mL          CULTURE, BLOOD #2 [[id number]] Collected: 08/04/21 1025    Order Status: Completed Specimen: Blood Updated: 08/09/21 0733     Special Requests: No Special Requests        Culture result: No growth 5 days       CULTURE, BLOOD #1 [[id number]] Collected: 08/04/21 1020 Order Status: Completed Specimen: Blood Updated: 08/09/21 0733     Special Requests: No Special Requests        Culture result: No growth 5 days              Labs:     Recent Labs     08/08/21  0500 08/07/21  0301   WBC 18.2* 18.3*   HGB 11.8* 12.1   HCT 34.6* 36.7   * 128*     Recent Labs     08/08/21  0500 08/07/21  0301    143  143   K 4.1 3.8  3.8   * 113*  113*   CO2 26 25  25   BUN 21* 23*  24*   CREA 0.59* 0.80  0.77   * 123*  123*   CA 7.4* 7.8*  7.8*   PHOS 1.9* 1.9*     Recent Labs     08/08/21  0500 08/07/21  0301   ALT  --  79*   AP  --  42*   TBILI  --  0.7   TP  --  6.0*   ALB 2.0* 2.2*  2.2*   GLOB  --  3.8     No results for input(s): INR, PTP, APTT, INREXT, INREXT in the last 72 hours. No results for input(s): FE, TIBC, PSAT, FERR in the last 72 hours. No results found for: FOL, RBCF   Recent Labs     08/09/21  0515 08/08/21  0415   PH 7.55* 7.50*   PCO2 29* 32*   PO2 107* 165*     No results for input(s): CPK, CKNDX, TROIQ in the last 72 hours.     No lab exists for component: CPKMB  No results found for: CHOL, CHOLX, CHLST, CHOLV, HDL, HDLP, LDL, LDLC, DLDLP, TGLX, TRIGL, TRIGP, CHHD, CHHDX  Lab Results   Component Value Date/Time    Glucose (POC) 130 (H) 08/08/2021 08:41 PM    Glucose (POC) 147 (H) 08/08/2021 06:02 PM    Glucose (POC) 135 (H) 08/08/2021 11:45 AM    Glucose (POC) 135 (H) 08/08/2021 04:09 AM    Glucose (POC) 135 (H) 08/07/2021 06:17 PM     Lab Results   Component Value Date/Time    Color Yellow/Straw 08/04/2021 06:41 PM    Appearance Clear 08/04/2021 06:41 PM    Specific gravity 1.026 08/04/2021 06:41 PM    pH (UA) 6.0 08/04/2021 06:41 PM    Protein 30 (A) 08/04/2021 06:41 PM    Glucose 50 (A) 08/04/2021 06:41 PM    Ketone 5 (A) 08/04/2021 06:41 PM    Bilirubin Negative 08/04/2021 06:41 PM    Urobilinogen 4.0 (H) 08/04/2021 06:41 PM    Nitrites Negative 08/04/2021 06:41 PM    Leukocyte Esterase Negative 08/04/2021 06:41 PM    Bacteria Negative 08/04/2021 06:41 PM    WBC 0-4 08/04/2021 06:41 PM    RBC 0-5 08/04/2021 06:41 PM         Assessment:     Acute Brainstem and cerebellar infarct  Acute hypoxemic respiratory failure intubated 40%  Aspiration pneumonia  Sepsis  History of CVA  History of seizures  Acute kidney injury      Plan:     Patient in ICU on ventilator 40% O2  On propofol drip for sedation  On Keppra 500 IV every 12 hours for seizures  On IV Zosyn for aspiration pneumonia  On aspirin and Lipitor  On Decadron  Continue IV fluids     We continue NG tube feedings  Dietitian consult     Overall prognosis poor    Patient is still full code        Current Facility-Administered Medications:     glycopyrrolate (ROBINUL) tablet 1 mg, 1 mg, Oral, BID, Wood Fletcher MD, 1 mg at 08/08/21 2228    zinc oxide-white petrolatum 17-57 % topical paste, , Topical, TID, Lupis Taylor MD, Given at 08/08/21 2228    levETIRAcetam (KEPPRA) 500 mg in saline (iso-osm) 100 mL IVPB (premix), 500 mg, IntraVENous, Q12H, Lupis Taylor MD, 500 mg at 08/09/21 0610    metoclopramide HCl (REGLAN) injection 10 mg, 10 mg, IntraVENous, Q4H PRN, Brittanie Taylor MD    0.9% sodium chloride infusion, 40 mL/hr, IntraVENous, CONTINUOUS, Wood Fletcher MD, Last Rate: 40 mL/hr at 08/08/21 1054, 40 mL/hr at 08/08/21 1054    acetaminophen (TYLENOL) tablet 650 mg, 650 mg, Oral, Q6H PRN, 650 mg at 08/06/21 2110 **OR** acetaminophen (TYLENOL) suppository 650 mg, 650 mg, Rectal, Q6H PRN, Brittanie Taylor MD    polyethylene glycol (MIRALAX) packet 17 g, 17 g, Oral, DAILY PRN, Brittanie Taylor MD    ondansetron (ZOFRAN ODT) tablet 4 mg, 4 mg, Oral, Q8H PRN **OR** ondansetron (ZOFRAN) injection 4 mg, 4 mg, IntraVENous, Q6H PRN, Lupis Taylor MD    enoxaparin (LOVENOX) injection 40 mg, 40 mg, SubCUTAneous, DAILY, Lupis Taylor MD, 40 mg at 08/08/21 3874    aspirin tablet 325 mg, 325 mg, Oral, DAILY, Lupis Taylor MD, 325 mg at 08/08/21 0828    atorvastatin (LIPITOR) tablet 40 mg, 40 mg, Oral, QHS, Lupis Taylor MD, 40 mg at 08/08/21 2228    piperacillin-tazobactam (ZOSYN) 3.375 g in 0.9% sodium chloride (MBP/ADV) 100 mL MBP, 3.375 g, IntraVENous, Q8H, Lupis Taylor MD, Last Rate: 200 mL/hr at 08/09/21 0325, 3.375 g at 08/09/21 0325    propofol (DIPRIVAN) 10 mg/mL infusion, 0-50 mcg/kg/min, IntraVENous, TITRATE, Lissette Rodrigues MD, Last Rate: 5.9 mL/hr at 08/08/21 1558, 15 mcg/kg/min at 08/08/21 1558    dexamethasone (DECADRON) 4 mg/mL injection 4 mg, 4 mg, IntraVENous, Q6H, Lissette Rodrigues MD, 4 mg at 08/09/21 0610    NOREPINephrine (LEVOPHED) 8 mg in 5% dextrose 250mL (32 mcg/mL) infusion, 10 mcg/min, IntraVENous, TITRATE, Karlos Taylor MD, Stopped at 08/05/21 1111                                                                                              *ATTENTION:  This note has been created by a medical student for educational purposes only. Please do not refer to the content of this note for clinical decision-making, billing, or other purposes. Please see attending physicians note to obtain clinical information on this patient. *

## 2021-08-09 NOTE — PROGRESS NOTES
initial visit for Spiritual Care Assessment. Patient non engaging, there were no visitors present.  provided silent prayer at doorway, consulted with unit manager regarding status.  advised staff of chaplains availability for follow up upon further referrals.      Visited by Mark Higgins, [facility]    can be contacted by calling  and requesting  on call

## 2021-08-09 NOTE — PROGRESS NOTES
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  Mental status changes  Respiratory failure  Other medical problems      EXAM:  Still on vent nonresponsive to verbal stimulus  Pulmonary input appreciated  Minimal stereotypic response to pain with decerebration  Left spastic hemiplegia stable      ASSESSMENT/PLAN:  No significant neurological improvement at this time.     ALLERGIES:    No Known Allergies    MEDS:      Current Facility-Administered Medications:     glycopyrrolate (ROBINUL) tablet 1 mg, 1 mg, Oral, BID, Jame Erickson MD, 1 mg at 08/08/21 2228    zinc oxide-white petrolatum 17-57 % topical paste, , Topical, TID, Lupis Taylor MD, Given at 08/08/21 2228    levETIRAcetam (KEPPRA) 500 mg in saline (iso-osm) 100 mL IVPB (premix), 500 mg, IntraVENous, Q12H, Lupis Taylor MD, 500 mg at 08/09/21 0610    metoclopramide HCl (REGLAN) injection 10 mg, 10 mg, IntraVENous, Q4H PRN, Sangeeta Taylor MD    0.9% sodium chloride infusion, 40 mL/hr, IntraVENous, CONTINUOUS, Jame Erickson MD, Last Rate: 40 mL/hr at 08/08/21 1054, 40 mL/hr at 08/08/21 1054    acetaminophen (TYLENOL) tablet 650 mg, 650 mg, Oral, Q6H PRN, 650 mg at 08/06/21 2110 **OR** acetaminophen (TYLENOL) suppository 650 mg, 650 mg, Rectal, Q6H PRN, Sangeeta Taylor MD    polyethylene glycol (MIRALAX) packet 17 g, 17 g, Oral, DAILY PRN, Sangeeta Taylor MD    ondansetron (ZOFRAN ODT) tablet 4 mg, 4 mg, Oral, Q8H PRN **OR** ondansetron (ZOFRAN) injection 4 mg, 4 mg, IntraVENous, Q6H PRN, Lupis Taylor MD    enoxaparin (LOVENOX) injection 40 mg, 40 mg, SubCUTAneous, DAILY, Lupis Taylor MD, 40 mg at 08/08/21 9050    aspirin tablet 325 mg, 325 mg, Oral, DAILY, Lupis Taylor MD, 325 mg at 08/08/21 7839    atorvastatin (LIPITOR) tablet 40 mg, 40 mg, Oral, QHS, Lupis Taylor MD, 40 mg at 08/08/21 2220    piperacillin-tazobactam (ZOSYN) 3.375 g in 0.9% sodium chloride (MBP/ADV) 100 mL MBP, 3.375 g, IntraVENous, Q8H, Lupis Taylor MD, Last Rate: 200 mL/hr at 08/09/21 0325, 3.375 g at 08/09/21 0325    propofol (DIPRIVAN) 10 mg/mL infusion, 0-50 mcg/kg/min, IntraVENous, TITRATE, Elia Gould MD, Last Rate: 5.9 mL/hr at 08/08/21 1558, 15 mcg/kg/min at 08/08/21 1558    dexamethasone (DECADRON) 4 mg/mL injection 4 mg, 4 mg, IntraVENous, Q6H, Elia Gould MD, 4 mg at 08/09/21 0610    NOREPINephrine (LEVOPHED) 8 mg in 5% dextrose 250mL (32 mcg/mL) infusion, 10 mcg/min, IntraVENous, TITRATE, Lupis Taylor MD, Stopped at 08/05/21 1111    LABS:  Recent Results (from the past 24 hour(s))   GLUCOSE, POC    Collection Time: 08/08/21 11:45 AM   Result Value Ref Range    Glucose (POC) 135 (H) 65 - 117 mg/dL    Performed by [address] [place] [address], POC    Collection Time: 08/08/21  6:02 PM   Result Value Ref Range    Glucose (POC) 147 (H) 65 - 117 mg/dL    Performed by [address] [place] [address], POC    Collection Time: 08/08/21  8:41 PM   Result Value Ref Range    Glucose (POC) 130 (H) 65 - 117 mg/dL    Performed by [id number], ARTERIAL    Collection Time: 08/09/21  5:15 AM   Result Value Ref Range    pH 7.55 (H) 7.35 - 7.45      PCO2 29 (L) 35 - 45 mmHg    PO2 107 (H) 75 - 100 mmHg    O2 SAT 98 >95 %    BICARBONATE 28 (H) 22 - 26 mmol/L    BASE EXCESS 3.6 (H) 0 - 2 mmol/L    O2 METHOD VENT      FIO2 40.0 %    MODE Assist Control/Volume Control      Tidal volume 450      SET RATE 5      EPAP/CPAP/PEEP 5.0      SITE Right Radial      KRUNAL'S TEST PASS         Visit Vitals  BP (!) 141/90 (BP 1 Location: Left upper arm, BP Patient Position: At rest)   Pulse 87   Temp 98.5 °F (36.9 °C)   Resp 29   Ht 5' 5\" (1.651 m)   Wt 81.4 kg (179 lb 7.3 oz)   SpO2 97%   BMI 29.86 kg/m²       Imaging:  XR CHEST PORT   Final Result   No significant interval change. XR CHEST PORT   Final Result   No significant change.       XR CHEST PORT   Final Result FINDINGS/IMPRESSION:   Support lines and tubes are appropriate in radiographic position. Persistent airspace disease throughout the left lung, likely pneumonia. Cardiac   silhouette stable in size. Hemodynamic status stable. XR CHEST PORT   Final Result      DUPLEX CAROTID BILATERAL   Final Result      XR CHEST PORT   Final Result      XR CHEST PORT   Final Result   ET tube placed, with tip 7 mm above the doretha. Asymmetric airspace   disease noted throughout the left lung, consistent with pneumonia, improved from   previous. Right lung clear. Stable normal heart size. Calcified aortic knob. XR CHEST PORT   Final Result   New diffuse airspace opacity in the left lung. MRI BRAIN WO CONT   Final Result   1. Restricted diffusion involving the cerebellar hemispheres, nemesio, and   possibly the left midbrain most likely representing acute ischemic infarcts   although the brainstem involvement may be partially artifactual. Consider a   short-term interval follow-up MRI. 2.  Attenuated vertebrobasilar flow voids which may be related to underlying   stenosis. CTA or MRA is recommended for further evaluation. 3.  Extensive multifocal encephalomalacia as described above. Report called to the patient's ICU nurse, Tu Staples at 5:45 PM on   8/4/2021 via phone conversation. XR CHEST PORT   Final Result   The cardiomediastinal silhouette is appropriate for age, technique,   and lung expansion. Pulmonary vasculature is not congested. The lungs are   essentially clear. No effusion or pneumothorax is seen. CT CODE NEURO HEAD WO CONTRAST   Final Result      No acute intracranial bleed. Bilateral areas of encephalomalacia. Atherosclerosis. A noncontrast head CT does not exclude the possibility of an   acute ischemic stroke. MRI is more sensitive for evaluation of brain parenchyma. Clinical correlation is recommended. Follow-up as clinically indicated.  The   results were called and verbally communicated to Dr. Angelique Carrel on 8/4/2021 at   1:00 AM.      Dental disease. Please see full report.       XR CHEST PORT    (Results Pending)

## 2021-08-09 NOTE — MED STUDENT NOTES
General Daily Progress Note          Patient Name:   Ny Monge       YOB: 1955       Age:  76 y.o. Admit Date: 8/4/2021      Subjective:     Patient admitted five days ago with altered mental status has remained unresponsive.  Patient's son reports that the patient's baseline, he is walking and talking.  A follow-up MRI revealed acute infarction of cerebellar hemispheres, nemesio and midbrian.  Chest x-ray since admission shows extensive new left-sided infiltrate, he is currently on the ventilator. Per Pulmonology, has self respirations, but remains on small dose propofol to avoid coughing against the ventilator. He is in the CCU, with no changes in mentation today. He remains intubated ad on ventilator at 55% O2. CXR on 8/8/2021:  Comparison:  8/7/2021     Single view:  Stable airspace disease in the right infrahilar lung and diffusely  within the left lung suspicious for pneumonia. No pneumothorax or large pleural  effusion apparent. The heart size is normal. Endotracheal tube tip in the  midthoracic trachea, unchanged. Enteric tube tip projects over the gastric  fundus, unchanged.     Objective:     Visit Vitals  BP (!) 141/90 (BP 1 Location: Left upper arm, BP Patient Position: At rest)   Pulse 85   Temp 98.5 °F (36.9 °C)   Resp 25   Ht 5' 5\" (1.651 m)   Wt 179 lb 7.3 oz (81.4 kg)   SpO2 100%   BMI 29.86 kg/m²        Recent Results (from the past 24 hour(s))   GLUCOSE, POC    Collection Time: 08/08/21 11:45 AM   Result Value Ref Range    Glucose (POC) 135 (H) 65 - 117 mg/dL    Performed by [address] [place] [address], POC    Collection Time: 08/08/21  6:02 PM   Result Value Ref Range    Glucose (POC) 147 (H) 65 - 117 mg/dL    Performed by [address] [place] [address], POC    Collection Time: 08/08/21  8:41 PM   Result Value Ref Range    Glucose (POC) 130 (H) 65 - 117 mg/dL    Performed by [address] [place] [address] 17-M, ARTERIAL    Collection Time: 08/09/21  5:15 AM Result Value Ref Range    pH 7.55 (H) 7.35 - 7.45      PCO2 29 (L) 35 - 45 mmHg    PO2 107 (H) 75 - 100 mmHg    O2 SAT 98 >95 %    BICARBONATE 28 (H) 22 - 26 mmol/L    BASE EXCESS 3.6 (H) 0 - 2 mmol/L    O2 METHOD VENT      FIO2 40.0 %    MODE Assist Control/Volume Control      Tidal volume 450      SET RATE 5      EPAP/CPAP/PEEP 5.0      SITE Right Radial      KRUNAL'S TEST PASS       [unfilled]      Review of Systems    Limited secondary to altered mental status      Physical Exam:      Constitutional: pt is non-responsive  HENT:   Head: Normocephalic and atraumatic. Eyes: Pupils are equal, round, and reactive to light. EOM are normal.   Cardiovascular: Normal rate, regular rhythm and normal heart sounds. Pulmonary/Chest: Breath sounds normal. No wheezes. No rales. Abdominal: Soft. Bowel sounds are normal. There is no abdominal tenderness. There is no rebound and no guarding. Musculoskeletal: Normal range of motion. Neurological: pt is non-responsive, patient is posturing (decerebrate)    XR CHEST PORT   Final Result   No significant change. XR CHEST PORT   Final Result   FINDINGS/IMPRESSION:   Support lines and tubes are appropriate in radiographic position. Persistent airspace disease throughout the left lung, likely pneumonia. Cardiac   silhouette stable in size. Hemodynamic status stable. XR CHEST PORT   Final Result      DUPLEX CAROTID BILATERAL   Final Result      XR CHEST PORT   Final Result      XR CHEST PORT   Final Result   ET tube placed, with tip 7 mm above the doretha. Asymmetric airspace   disease noted throughout the left lung, consistent with pneumonia, improved from   previous. Right lung clear. Stable normal heart size. Calcified aortic knob. XR CHEST PORT   Final Result   New diffuse airspace opacity in the left lung. MRI BRAIN WO CONT   Final Result   1.   Restricted diffusion involving the cerebellar hemispheres, nemesio, and   possibly the left midbrain most likely representing acute ischemic infarcts   although the brainstem involvement may be partially artifactual. Consider a   short-term interval follow-up MRI. 2.  Attenuated vertebrobasilar flow voids which may be related to underlying   stenosis. CTA or MRA is recommended for further evaluation. 3.  Extensive multifocal encephalomalacia as described above. Report called to the patient's ICU nurse, Rd Cabezas at 5:45 PM on   8/4/2021 via phone conversation. XR CHEST PORT   Final Result   The cardiomediastinal silhouette is appropriate for age, technique,   and lung expansion. Pulmonary vasculature is not congested. The lungs are   essentially clear. No effusion or pneumothorax is seen. CT CODE NEURO HEAD WO CONTRAST   Final Result      No acute intracranial bleed. Bilateral areas of encephalomalacia. Atherosclerosis. A noncontrast head CT does not exclude the possibility of an   acute ischemic stroke. MRI is more sensitive for evaluation of brain parenchyma. Clinical correlation is recommended. Follow-up as clinically indicated. The   results were called and verbally communicated to Dr. Holly Garcia on 8/4/2021 at   1:00 AM.      Dental disease. Please see full report.       XR CHEST PORT    (Results Pending)        Recent Results (from the past 24 hour(s))   GLUCOSE, POC    Collection Time: 08/08/21 11:45 AM   Result Value Ref Range    Glucose (POC) 135 (H) 65 - 117 mg/dL    Performed by [address] [place] [address], POC    Collection Time: 08/08/21  6:02 PM   Result Value Ref Range    Glucose (POC) 147 (H) 65 - 117 mg/dL    Performed by [address] [place] [address], POC    Collection Time: 08/08/21  8:41 PM   Result Value Ref Range    Glucose (POC) 130 (H) 65 - 117 mg/dL    Performed by [address] [place] [address] 17, ARTERIAL    Collection Time: 08/09/21  5:15 AM   Result Value Ref Range    pH 7.55 (H) 7.35 - 7.45      PCO2 29 (L) 35 - 45 mmHg    PO2 107 (H) 75 - 100 mmHg    O2 SAT 98 >95 %    BICARBONATE 28 (H) 22 - 26 mmol/L    BASE EXCESS 3.6 (H) 0 - 2 mmol/L    O2 METHOD VENT      FIO2 40.0 %    MODE Assist Control/Volume Control      Tidal volume 450      SET RATE 5      EPAP/CPAP/PEEP 5.0      SITE Right Radial      KRUNAL'S TEST PASS         Results     Procedure Component Value Units Date/Time    CULTURE, BLOOD, LINE [[id number]] Collected: 08/05/21 0102    Order Status: Completed Specimen: Blood Updated: 08/09/21 0733     Special Requests: No Special Requests        Culture result: No growth 3 days       CULTURE, RESPIRATORY/SPUTUM/BRONCH Rhiannon Keane STAIN [[id number]] Collected: 08/04/21 2230    Order Status: Completed Specimen: Sputum Updated: 08/07/21 1134     Special Requests: No Special Requests        GRAM STAIN Rare WBCs seen         Rare EPITHELIAL CELLS               Occasional Gram Positive Cocci in clusters                  Occasional Gram Negative Rods           Culture result:       Light Normal respiratory jesus          CULTURE, URINE [[id number]] Collected: 08/04/21 1841    Order Status: Completed Specimen: Urine Updated: 08/06/21 0729     Special Requests: No Special Requests        Culture result: No Growth (<1000 cfu/mL)       MRSA SCREEN - PCR (NASAL) [[id number]] Collected: 08/04/21 1841    Order Status: Completed Specimen: Swab Updated: 08/05/21 0027     MRSA by PCR, Nasal Not Detected       CULTURE, URINE [[id number]] Collected: 08/04/21 1100    Order Status: Completed Specimen: Urine Updated: 08/05/21 1416     Special Requests: No Special Requests        Culture result:       No significant growth, <10,000 CFU/mL          CULTURE, BLOOD #2 [[id number]] Collected: 08/04/21 1025    Order Status: Completed Specimen: Blood Updated: 08/09/21 0733     Special Requests: No Special Requests        Culture result: No growth 5 days       CULTURE, BLOOD #1 [[id number]] Collected: 08/04/21 1020    Order Status: Completed Specimen: Blood Updated: 08/09/21 0578 Special Requests: No Special Requests        Culture result: No growth 5 days              Labs:     Recent Labs     08/08/21  0500 08/07/21  0301   WBC 18.2* 18.3*   HGB 11.8* 12.1   HCT 34.6* 36.7   * 128*     Recent Labs     08/08/21  0500 08/07/21  0301    143  143   K 4.1 3.8  3.8   * 113*  113*   CO2 26 25  25   BUN 21* 23*  24*   CREA 0.59* 0.80  0.77   * 123*  123*   CA 7.4* 7.8*  7.8*   PHOS 1.9* 1.9*     Recent Labs     08/08/21  0500 08/07/21  0301   ALT  --  79*   AP  --  42*   TBILI  --  0.7   TP  --  6.0*   ALB 2.0* 2.2*  2.2*   GLOB  --  3.8     No results for input(s): INR, PTP, APTT, INREXT in the last 72 hours. No results for input(s): FE, TIBC, PSAT, FERR in the last 72 hours. No results found for: FOL, RBCF   Recent Labs     08/09/21  0515 08/08/21  0415   PH 7.55* 7.50*   PCO2 29* 32*   PO2 107* 165*     No results for input(s): CPK, CKNDX, TROIQ in the last 72 hours.     No lab exists for component: CPKMB  No results found for: CHOL, CHOLX, CHLST, CHOLV, HDL, HDLP, LDL, LDLC, DLDLP, TGLX, TRIGL, TRIGP, CHHD, CHHDX  Lab Results   Component Value Date/Time    Glucose (POC) 130 (H) 08/08/2021 08:41 PM    Glucose (POC) 147 (H) 08/08/2021 06:02 PM    Glucose (POC) 135 (H) 08/08/2021 11:45 AM    Glucose (POC) 135 (H) 08/08/2021 04:09 AM    Glucose (POC) 135 (H) 08/07/2021 06:17 PM     Lab Results   Component Value Date/Time    Color Yellow/Straw 08/04/2021 06:41 PM    Appearance Clear 08/04/2021 06:41 PM    Specific gravity 1.026 08/04/2021 06:41 PM    pH (UA) 6.0 08/04/2021 06:41 PM    Protein 30 (A) 08/04/2021 06:41 PM    Glucose 50 (A) 08/04/2021 06:41 PM    Ketone 5 (A) 08/04/2021 06:41 PM    Bilirubin Negative 08/04/2021 06:41 PM    Urobilinogen 4.0 (H) 08/04/2021 06:41 PM    Nitrites Negative 08/04/2021 06:41 PM    Leukocyte Esterase Negative 08/04/2021 06:41 PM    Bacteria Negative 08/04/2021 06:41 PM    WBC 0-4 08/04/2021 06:41 PM    RBC 0-5 08/04/2021 06:41 PM         Assessment:     Acute Brainstem and cerebellar infarct  Acute hypoxemic respiratory failure intubated  Aspiration pneumonia  Sepsis  History of CVA  History of seizures  Acute kidney injury      Plan:     Patient in ICU on ventilator 55% O2  On propofol drip for sedation  On Keppra 500 IV every 12 hours for seizures  On IV Zosyn for aspiration pneumonia  Patient on Levophed drip for hypotension  On aspirin and Lipitor     Continue IV fluids     We continue NG tube feedings  Dietitian consult     Overall prognosis poor    Patient is still full code        Current Facility-Administered Medications:     glycopyrrolate (ROBINUL) tablet 1 mg, 1 mg, Oral, BID, Najma Casarez MD, 1 mg at 08/08/21 2228    zinc oxide-white petrolatum 17-57 % topical paste, , Topical, TID, Lupis Taylor MD, Given at 08/08/21 2228    levETIRAcetam (KEPPRA) 500 mg in saline (iso-osm) 100 mL IVPB (premix), 500 mg, IntraVENous, Q12H, Lupis Taylor MD, 500 mg at 08/09/21 0610    metoclopramide HCl (REGLAN) injection 10 mg, 10 mg, IntraVENous, Q4H PRN, Mohiuddin, Curtis Halsted, MD    0.9% sodium chloride infusion, 40 mL/hr, IntraVENous, CONTINUOUS, Najma Casarez MD, Last Rate: 40 mL/hr at 08/08/21 1054, 40 mL/hr at 08/08/21 1054    acetaminophen (TYLENOL) tablet 650 mg, 650 mg, Oral, Q6H PRN, 650 mg at 08/06/21 2110 **OR** acetaminophen (TYLENOL) suppository 650 mg, 650 mg, Rectal, Q6H PRN, Mohiuddin, Curtis Halsted, MD    polyethylene glycol (MIRALAX) packet 17 g, 17 g, Oral, DAILY PRN, Mohiuddin, Curtis Halsted, MD    ondansetron (ZOFRAN ODT) tablet 4 mg, 4 mg, Oral, Q8H PRN **OR** ondansetron (ZOFRAN) injection 4 mg, 4 mg, IntraVENous, Q6H PRN, Lupis Taylor MD    enoxaparin (LOVENOX) injection 40 mg, 40 mg, SubCUTAneous, DAILY, Lupis Taylor MD, 40 mg at 08/08/21 1513    aspirin tablet 325 mg, 325 mg, Oral, DAILY, Lupis Taylor MD, 325 mg at 08/08/21 0828    atorvastatin (LIPITOR) tablet 40 mg, 40 mg, Oral, QHS, Gregorio Banda MD, 40 mg at 08/08/21 2228    piperacillin-tazobactam (ZOSYN) 3.375 g in 0.9% sodium chloride (MBP/ADV) 100 mL MBP, 3.375 g, IntraVENous, Q8H, Lupis Taylor MD, Last Rate: 200 mL/hr at 08/09/21 0325, 3.375 g at 08/09/21 0325    propofol (DIPRIVAN) 10 mg/mL infusion, 0-50 mcg/kg/min, IntraVENous, TITRATE, Tin Cobian MD, Last Rate: 5.9 mL/hr at 08/08/21 1558, 15 mcg/kg/min at 08/08/21 1558    dexamethasone (DECADRON) 4 mg/mL injection 4 mg, 4 mg, IntraVENous, Q6H, Tin Cobian MD, 4 mg at 08/09/21 0610    NOREPINephrine (LEVOPHED) 8 mg in 5% dextrose 250mL (32 mcg/mL) infusion, 10 mcg/min, IntraVENous, TITRATE, Jeremias Taylor MD, Stopped at 08/05/21 1111                                                                                              *ATTENTION:  This note has been created by a medical student for educational purposes only. Please do not refer to the content of this note for clinical decision-making, billing, or other purposes. Please see attending physicians note to obtain clinical information on this patient. *

## 2021-08-09 NOTE — PROGRESS NOTES
Consult  Pulmonary, Critical Care    Name: Trena Franco MRN: [id number]   : 1955 Hospital: [facility]   Date: 2021  Admission date: 2021 Hospital Day: 6       Subjective/Interval History:   Patient admitted last night with altered mental status has remained unresponsive today. Some notes mention that he had vomiting at home before admission. His admission chest x-ray was relatively clear. He did go to MRI today came back to the floor Dr. Cinthia Peña saw him at that time he had agonal respirations and anesthesia was called to intubate him. Chest x-ray shows extensive new left-sided infiltrate he is currently on the ventilator has been transferred to the ICU. Dr. Cinthia Peña asked for me to see him for vent management    8 remains on the ventilator mildly sedated on propofol 10 mics. He has spontaneous respirations  8/ remains on the ventilator reinstituted propofol due to cough and discoordination with the ventilator. He has self respirations   no change on the ventilator has self respirations remains on small dose propofol to avoid coughing against the ventilator    Patient Active Problem List   Diagnosis Code    Altered mental status R41.82    Dysarthria R47.1    AMS (altered mental status) R41.82       IMPRESSION:   1. Acute hypoxic respiratory failure remains on mechanical ventilator FiO2 40%  2. Aspiration pneumonia extensive left lung unchanged radiographically  3. Hypotension questionably sepsis resolved  4. Extensive acute CVA  cerebellar and brainstem seen on MRI remains unresponsive  5. Acute kidney injury resolved creatinine now normal  6. Elevated troponin probably acute myocardial infarct from anoxia  7. Hypophosphatemia to be repleted labs pending today  8. Hypocalcemia repleated  9. History seizure disorder  10. History intracranial aneurysm repair years ago  6. Chronic left hemiparesis  Body mass index is 29.86 kg/m².   12. RECOMMENDATIONS/PLAN:   1. Have decreased baseline ventilatory rate and he has spontaneous respiration  2. Continue IV Zosyn  3. Replace phosphorus  4.   5. Continue IV fluids  6.   7.          Subjective/Initial History:       I was asked by Garth Barillas MD to see Celestina Senior a 76 y.o.  male  in consultation for a chief complaint of acute hypoxic respiratory failure aspiration pneumonia      The patient is unable to give any meaningful history or review of systems due to patient factors. Patient PCP: None  PMH:  has a past medical history of CVA (cerebral vascular accident) ([facility] [address] and Seizure ([facility] [address] PSH:   has no past surgical history on file. FHX: family history is not on file. SHX:  reports that he has quit smoking. He has never used smokeless tobacco. He reports previous alcohol use. He reports that he does not use drugs.     Systemic review unobtainable as patient is unresponsive on the ventilator    No Known Allergies   MEDS:   Current Facility-Administered Medications   Medication    glycopyrrolate (ROBINUL) tablet 1 mg    zinc oxide-white petrolatum 17-57 % topical paste    levETIRAcetam (KEPPRA) 500 mg in saline (iso-osm) 100 mL IVPB (premix)    metoclopramide HCl (REGLAN) injection 10 mg    0.9% sodium chloride infusion    acetaminophen (TYLENOL) tablet 650 mg    Or    acetaminophen (TYLENOL) suppository 650 mg    polyethylene glycol (MIRALAX) packet 17 g    ondansetron (ZOFRAN ODT) tablet 4 mg    Or    ondansetron (ZOFRAN) injection 4 mg    enoxaparin (LOVENOX) injection 40 mg    aspirin tablet 325 mg    atorvastatin (LIPITOR) tablet 40 mg    piperacillin-tazobactam (ZOSYN) 3.375 g in 0.9% sodium chloride (MBP/ADV) 100 mL MBP    propofol (DIPRIVAN) 10 mg/mL infusion    dexamethasone (DECADRON) 4 mg/mL injection 4 mg    NOREPINephrine (LEVOPHED) 8 mg in 5% dextrose 250mL (32 mcg/mL) infusion        Current Facility-Administered Medications:    glycopyrrolate (ROBINUL) tablet 1 mg, 1 mg, Oral, BID, Daniella Morrow MD, 1 mg at 08/08/21 2228    zinc oxide-white petrolatum 17-57 % topical paste, , Topical, TID, Lupis Taylor MD, Given at 08/08/21 2228    levETIRAcetam (KEPPRA) 500 mg in saline (iso-osm) 100 mL IVPB (premix), 500 mg, IntraVENous, Q12H, Lupis Taylor MD, 500 mg at 08/09/21 0610    metoclopramide HCl (REGLAN) injection 10 mg, 10 mg, IntraVENous, Q4H PRN, Jeff Taylor MD    0.9% sodium chloride infusion, 40 mL/hr, IntraVENous, CONTINUOUS, Daniella Morrow MD, Last Rate: 40 mL/hr at 08/08/21 1054, 40 mL/hr at 08/08/21 1054    acetaminophen (TYLENOL) tablet 650 mg, 650 mg, Oral, Q6H PRN, 650 mg at 08/06/21 2110 **OR** acetaminophen (TYLENOL) suppository 650 mg, 650 mg, Rectal, Q6H PRN, Jeff Taylor MD    polyethylene glycol (MIRALAX) packet 17 g, 17 g, Oral, DAILY PRN, Jeff Taylor MD    ondansetron (ZOFRAN ODT) tablet 4 mg, 4 mg, Oral, Q8H PRN **OR** ondansetron (ZOFRAN) injection 4 mg, 4 mg, IntraVENous, Q6H PRN, Lupis Taylor MD    enoxaparin (LOVENOX) injection 40 mg, 40 mg, SubCUTAneous, DAILY, Lupis Taylor MD, 40 mg at 08/08/21 9766    aspirin tablet 325 mg, 325 mg, Oral, DAILY, Lupis Taylor MD, 325 mg at 08/08/21 3791    atorvastatin (LIPITOR) tablet 40 mg, 40 mg, Oral, QHS, Lupis Taylor MD, 40 mg at 08/08/21 2228    piperacillin-tazobactam (ZOSYN) 3.375 g in 0.9% sodium chloride (MBP/ADV) 100 mL MBP, 3.375 g, IntraVENous, Q8H, Lupis Taylor MD, Last Rate: 200 mL/hr at 08/09/21 0325, 3.375 g at 08/09/21 0325    propofol (DIPRIVAN) 10 mg/mL infusion, 0-50 mcg/kg/min, IntraVENous, TITRATE, Daniella Morrow MD, Last Rate: 5.9 mL/hr at 08/08/21 1558, 15 mcg/kg/min at 08/08/21 1558    dexamethasone (DECADRON) 4 mg/mL injection 4 mg, 4 mg, IntraVENous, Q6H, Daniella Morrow MD, 4 mg at 08/09/21 0610    NOREPINephrine (LEVOPHED) 8 mg in 5% dextrose 250mL (32 mcg/mL) infusion, 10 mcg/min, IntraVENous, TITRATE, Selvin Taylor MD, Stopped at 21 1111      Objective:     Vital Signs: Telemetry:    normal sinus rhythm Intake/Output:   Visit Vitals  BP (!) 141/90 (BP 1 Location: Left upper arm, BP Patient Position: At rest)   Pulse 87   Temp 98.5 °F (36.9 °C)   Resp 29   Ht 5' 5\" (1.651 m)   Wt 81.4 kg (179 lb 7.3 oz)   SpO2 97%   BMI 29.86 kg/m²       Temp (24hrs), Av.4 °F (37.4 °C), Min:97.5 °F (36.4 °C), Max:100.9 °F (38.3 °C)        O2 Device: Ventilator           Body mass index is 29.86 kg/m². Wt Readings from Last 4 Encounters:   21 81.4 kg (179 lb 7.3 oz)          Intake/Output Summary (Last 24 hours) at 2021 0851  Last data filed at 2021 0330  Gross per 24 hour   Intake 1175 ml   Output 1325 ml   Net -150 ml       Last shift:      No intake/output data recorded. Last 3 shifts:  1901 -  0700  In: 1755.9 [I.V.:705.9]  Out: 1725 [Urine:1725]       Hemodynamics:    CO:    CI:    CVP:    SVR:   PAP Systolic:    PAP Diastolic:    PVR:    FO70:       Ventilator Settings:      Mode Rate TV Press PEEP FiO2 PIP Min. Vent   Assist control, Volume control    450 ml    5 cm H20 40 %  21 cm H2O  9.37 l/min      Physical Exam:    General:  male; unresponsive on the ventilator   HEENT: NCAT, ET tube in place  Eyes: anicteric; conjunctiva clear no doll's eye reflex eyes are disconjugate  Neck: no nodes, no cuff leak, no accessory MM use. No JVD  Chest: no deformity,   Cardiac: Regular rate and rhythm  Lungs: Has spontaneous respiration relatively clear right anterior lateral and posterior, better breath sounds over the left chest with posterior rales   Abd: Soft a few bowel sounds no grimacing to palpation  Ext: no edema; no joint swelling; No clubbing  : clear urine  Neuro:  On the ventilator on propofol unresponsive no doll's eye reflex flaccid extremities has extremely pronounced posturing when stimulated  Psych-  unable to assess  Skin: warm, dry, no cyanosis;   Pulses: Brachial and radial pulses intact  Capillary: Normal capillary refill      Labs:    Recent Labs     08/08/21  0500 08/07/21  0301   WBC 18.2* 18.3*   HGB 11.8* 12.1   * 128*     Recent Labs     08/08/21  0500 08/07/21  0301    143  143   K 4.1 3.8  3.8   * 113*  113*   CO2 26 25  25   * 123*  123*   BUN 21* 23*  24*   CREA 0.59* 0.80  0.77   CA 7.4* 7.8*  7.8*   PHOS 1.9* 1.9*   ALB 2.0* 2.2*  2.2*   ALT  --  79*           Lab Results   Component Value Date/Time    Culture result: No growth 3 days 08/05/2021 01:02 AM    Culture result: Light Normal respiratory jesus 08/04/2021 10:30 PM    Culture result: No Growth (<1000 cfu/mL) 08/04/2021 06:41 PM   Urine no growth  Sputum rare polys with normal jesus  Blood no growth  Nasal MRSA smear negative    Imaging:  I have personally reviewed the patients radiographs and have reviewed the reports:    CXR Results  (Last 48 hours)  8/7 chest x-ray ET and NG tube in place extensive left infiltrate with left base better inflation slowly clearing  8/6 chest x-ray ET NG tube in place extensive left infiltrate mildly improved  8/4 chest x-ray after intubation shows ET tube a little low left lung reinflated extensive infiltrate persist  8/4 radiology has called MRI or results as acute cerebellar and brainstem infarct               08/04/21 1710  XR CHEST PORT Final result    Impression:  New diffuse airspace opacity in the left lung. Narrative:  Chest, frontal view, 8/4/2021       History: Shortness of breath. Comparison: Chest, same day. Findings: The cardiac silhouette is stable. The right lung is adequately   expanded and clear. The left lung has diffuse airspace opacity, new as compared   to study performed earlier the same day. Differential considerations include   aspiration, collapse secondary to mucous plugging. Clinical correlation is   recommended. Left pleural effusion is not excluded.   No pneumothorax is   identified. Degenerative changes are present in the thoracic spine. 08/04/21 0853  XR CHEST PORT Final result    Impression:  The cardiomediastinal silhouette is appropriate for age, technique,   and lung expansion. Pulmonary vasculature is not congested. The lungs are   essentially clear. No effusion or pneumothorax is seen. Narrative:  1 view               Results from Hospital Encounter encounter on 08/04/21    XR CHEST PORT    Narrative  Chest, 2 frontal views, 8/9/2021    History: Aspiration pneumonia. Comparison: Including chest 8/8/2021. Findings: The chest is obscured by artifact. Endotracheal tube tip is 4.3 cm  from the doretha. Feeding tube tip is at the stomach. The cardiac silhouette is  stable. Lung volumes are not significantly changed. Airspace opacities  throughout the left lung and right base are not significantly changed. No  pleural effusion or pneumothorax identified. The osseous structures are stable. Impression  No significant interval change. XR CHEST PORT    Narrative  XR CHEST PORT    Comparison:  8/7/2021    Single view:  Stable airspace disease in the right infrahilar lung and diffusely  within the left lung suspicious for pneumonia. No pneumothorax or large pleural  effusion apparent. The heart size is normal. Endotracheal tube tip in the  midthoracic trachea, unchanged. Enteric tube tip projects over the gastric  fundus, unchanged. Impression  No significant change. XR CHEST PORT    Narrative  XR CHEST PORT    Comparison: Chest radiograph dated August 6, 2021    Impression  FINDINGS/IMPRESSION:  Support lines and tubes are appropriate in radiographic position. Persistent airspace disease throughout the left lung, likely pneumonia. Cardiac  silhouette stable in size. Hemodynamic status stable.     Results from [place] encounter on 08/04/21    CT CODE NEURO HEAD WO CONTRAST    Narrative  CT head without contrast    Dose reduction: All CT scans at this facility are performed using dose reduction  optimization techniques as appropriate to a performed exam including the  following: Automated exposure control, adjustments of the mA and/or kV according  to patient size, or use of iterative reconstruction technique. INDICATION: Code neuro    FINDINGS:    No acute intracranial bleed or midline shift. Areas of encephalomalacia in the  right hemisphere and left frontal lobe. Probable white matter small vessel  ischemic changes. Mild ex vacuo dilatation of right lateral ventricle. MRI is  more sensitive for evaluation of brain parenchyma. Atherosclerosis. No acute  skull fracture. Postoperative changes right skull. No fluid in the paranasal  sinuses or mastoid air cells. Caries and periapical infection/abscess involving  multiple teeth. Impression  No acute intracranial bleed. Bilateral areas of encephalomalacia. Atherosclerosis. A noncontrast head CT does not exclude the possibility of an  acute ischemic stroke. MRI is more sensitive for evaluation of brain parenchyma. Clinical correlation is recommended. Follow-up as clinically indicated. The  results were called and verbally communicated to Dr. Pawel Wills on 8/4/2021 at  1:00 AM.    Dental disease. Please see full report. Discussion aspiration pneumonia with acute respiratory failure secondary to vomiting and brainstem and cerebellar infarct. Can add Decadron to try and prevent edema but it is not likely to help. 8/5 remains unresponsive does have some generalized movement when sternal rub. Renal function has deteriorated questionable ATN. Chest x-ray shows extensive infiltrate from aspiration. Troponin has elevated. MRI results as mentioned with new acute cerebellar infarcts and probable brainstem as well recovery seems unlikely  8/6 remains unresponsive on the ventilator on small dose of propofol to avoid cough on ventilator.   He does have spontaneous respirations. Sputum does show gram negative zeke a few gram-positive cocci nasal MRSA smear is negative we will maintain Zosyn  8/7 remains unresponsive on the ventilator. Propofol reintroduced at low rate as he was bucking the ventilator. No specific pathogen on sputum culture likely aspiration we will continue Zosyn phosphorus is low will replete  8/8 unchanged remains on the ventilator unresponsive continue propofol to avoid coughing against the ventilator. Phosphorus remains low. Creatinine normal will decrease IV fluids  8/9 unresponsive on the ventilator receiving low-dose propofol to prevent coughing against the ventilator has profound posturing when he is stimulated otherwise unresponsive. Chest x-ray still shows extensive infiltrate  Time of care 30 minutes           Thank you for allowing us to participate in the care of this patient.   We will follow along with you    Renetta Solorzano MD

## 2021-08-10 NOTE — PROGRESS NOTES
Son at bedside to discuss comfort care/code status. Witnessed phone call with Dr. Lam Lincoln discussing family wishes to make pt DNR. Per pt, family will be arriving between [phone] on 08/11/21 to withdraw care.

## 2021-08-10 NOTE — PROGRESS NOTES
Comprehensive Nutrition Assessment    Type and Reason for Visit: Reassess (interim)    Nutrition Recommendations/Plan:   Continue TF via OG of Osmolite 1.2 at 63mL/hr, continuous  Flush with 125mL free water q4hr  Goal feeds provide 1814kcal, 84g protein, 1990mL fluid (>/= 95%kcal +pro, 101%fluid)         Propofol (20mcg/kg/min) providing 208kcal/day (~106% kcal needs with TF)    Document TF rate, water flushes, and GRVs in EMR    Nutrition Assessment:  Unresponsive, emesis pta. CT head without acute findings. MRI (8/4) suggestive of L acute ischemic infarcts. Plans for EEG. Rapid response s/p MRI, intubated and transferred to ICU. CXR finding extensive new L sided infiltrate, likely aspiration. Remains intubated, sedated. Previously on 1x pressor, now d/c. Remain unable to wean from vent. TF initiated and achieved goal rate 8/6, has remained at goal since. Good tolerance per RN. Labs: BUN wnl, Cr 0.54, , Ca 8.2, Phos (8/10) 2.9, , , ALP wnl. Meds: Propofol at 20mcg/kg/min, zosyn, dexamethasone, statin, keppra, rubinol, KPhos. Malnutrition Assessment:  Malnutrition Status:  Mild malnutrition    Context:  Acute illness       Estimated Daily Nutrient Needs:  Energy (kcal): 1900kcal (PSU 2003b); Weight Used for Energy Requirements: Current  Protein (g): 86g (1.3g/kg); Weight Used for Protein Requirements: Current  Fluid (ml/day): 1900mL; Method Used for Fluid Requirements: 1 ml/kcal    Nutrition Related Findings:  Limited NFPE finding mild muscle wasting. No noted hx dysphagia, however possible 2/2 stroke and L hemiparesis- noted son feeds patient. 1x episode of emesis pta, no further. No noted c/d, last BM 8/5. Wounds:    None       Current Nutrition Therapies:  DIET NPO  ADULT TUBE FEEDING Nasogastric; Standard without Fiber; Delivery Method: Continuous; Continuous Initial Rate (mL/hr): 30; Continuous Advance Tube Feeding: Yes; Advancement Volume (mL/hr): 20;  Advancement Frequency: Q 4 hours; Continuous Goal Rate . .. Anthropometric Measures:  · Height:  5' 5\" (165.1 cm)  · Current Body Wt:  65.8 kg (145 lb 1 oz) (8/4)    · Ideal Body Wt:  136 lbs:  106.7 %   · BMI Category:  Normal weight (BMI 18.5-24. 9)       Nutrition Diagnosis:   · Inadequate oral intake related to impaired respiratory function as evidenced by intubation    Nutrition Interventions:   Food and/or Nutrient Delivery: Continue NPO, Continue tube feeding  Nutrition Education and Counseling: No recommendations at this time  Coordination of Nutrition Care: Continue to monitor while inpatient    Goals:  Meet >75% EENs in 5-7 days, Maintain skin integrity, Lytes wnl       Nutrition Monitoring and Evaluation:   Behavioral-Environmental Outcomes: None identified  Food/Nutrient Intake Outcomes: Enteral nutrition intake/tolerance, Diet advancement/tolerance  Physical Signs/Symptoms Outcomes: Weight, Skin    Discharge Planning:     Too soon to determine     Electronically signed by Colleenant Carranza on 8/10/2021 at 12:37 PM    Contact:

## 2021-08-10 NOTE — PROGRESS NOTES
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  Respiratory failure  Mental status changes  Other medical problems      EXAM:  Still on vent  Nonresponsive  Continues to decerebrate  Left spastic hemiplegia unchanged      ASSESSMENT/PLAN:  Neurological prognosis has not changed for now    ALLERGIES:    No Known Allergies    MEDS:      Current Facility-Administered Medications:     heparin (porcine) injection 5,000 Units, 5,000 Units, SubCUTAneous, BID, Lupis Taylor MD, 5,000 Units at 08/10/21 7661    glycopyrrolate (ROBINUL) tablet 1 mg, 1 mg, Oral, BID, Kiersten Pascal MD, 1 mg at 08/10/21 4724    zinc oxide-white petrolatum 17-57 % topical paste, , Topical, TID, Lupis Taylor MD, Given at 08/10/21 0848    levETIRAcetam (KEPPRA) 500 mg in saline (iso-osm) 100 mL IVPB (premix), 500 mg, IntraVENous, Q12H, Lupis Taylor MD, 500 mg at 08/10/21 0849    metoclopramide HCl (REGLAN) injection 10 mg, 10 mg, IntraVENous, Q4H PRN, Ghada Taylor MD    0.9% sodium chloride infusion, 40 mL/hr, IntraVENous, CONTINUOUS, Kiersten Pascal MD, Last Rate: 40 mL/hr at 08/08/21 1054, 40 mL/hr at 08/08/21 1054    acetaminophen (TYLENOL) tablet 650 mg, 650 mg, Oral, Q6H PRN, 650 mg at 08/06/21 2110 **OR** acetaminophen (TYLENOL) suppository 650 mg, 650 mg, Rectal, Q6H PRN, Ghada Taylor MD    polyethylene glycol (MIRALAX) packet 17 g, 17 g, Oral, DAILY PRN, Ghada Taylor MD    ondansetron (ZOFRAN ODT) tablet 4 mg, 4 mg, Oral, Q8H PRN **OR** ondansetron (ZOFRAN) injection 4 mg, 4 mg, IntraVENous, Q6H PRN, Lupis Taylor MD    aspirin tablet 325 mg, 325 mg, Oral, DAILY, Lupis Taylor MD, 325 mg at 08/10/21 0849    atorvastatin (LIPITOR) tablet 40 mg, 40 mg, Oral, QHS, Lupis Taylor MD, 40 mg at 08/09/21 2239    piperacillin-tazobactam (ZOSYN) 3.375 g in 0.9% sodium chloride (MBP/ADV) 100 mL MBP, 3.375 g, IntraVENous, Q8H, Lupis Taylor MD, Last Rate: 200 mL/hr at 08/10/21 0300, 3.375 g at 08/10/21 0300    propofol (DIPRIVAN) 10 mg/mL infusion, 0-50 mcg/kg/min, IntraVENous, TITRATE, Lesly Franco MD, Last Rate: 7.9 mL/hr at 08/10/21 0852, 20 mcg/kg/min at 08/10/21 0852    dexamethasone (DECADRON) 4 mg/mL injection 4 mg, 4 mg, IntraVENous, Q6H, Lesly Franco MD, 4 mg at 08/10/21 0852    NOREPINephrine (LEVOPHED) 8 mg in 5% dextrose 250mL (32 mcg/mL) infusion, 10 mcg/min, IntraVENous, TITRATE, Lupis Taylor MD, Stopped at 08/05/21 1111    LABS:  Recent Results (from the past 24 hour(s))   MAGNESIUM    Collection Time: 08/09/21 10:50 AM   Result Value Ref Range    Magnesium 2.4 1.6 - 2.4 mg/dL   CBC WITH AUTOMATED DIFF    Collection Time: 08/09/21 10:50 AM   Result Value Ref Range    WBC 17.9 (H) 4.1 - 11.1 K/uL    RBC 3.89 (L) 4.10 - 5.70 M/uL    HGB 11.6 (L) 12.1 - 17.0 g/dL    HCT 35.2 (L) 36.6 - 50.3 %    MCV 90.5 80.0 - 99.0 FL    MCH 29.8 26.0 - 34.0 PG    MCHC 33.0 30.0 - 36.5 g/dL    RDW 13.2 11.5 - 14.5 %    PLATELET 747 (L) 699 - 400 K/uL    MPV 12.1 8.9 - 12.9 FL    NRBC 0.0 0.0  WBC    ABSOLUTE NRBC 0.00 0.00 - 0.01 K/uL    NEUTROPHILS 94 (H) 32 - 75 %    LYMPHOCYTES 2 (L) 12 - 49 %    MONOCYTES 3 (L) 5 - 13 %    EOSINOPHILS 0 0 - 7 %    BASOPHILS 0 0 - 1 %    IMMATURE GRANULOCYTES 1 (H) 0 - 0.5 %    ABS. NEUTROPHILS 16.8 (H) 1.8 - 8.0 K/UL    ABS. LYMPHOCYTES 0.3 (L) 0.8 - 3.5 K/UL    ABS. MONOCYTES 0.5 0.0 - 1.0 K/UL    ABS. EOSINOPHILS 0.0 0.0 - 0.4 K/UL    ABS. BASOPHILS 0.0 0.0 - 0.1 K/UL    ABS. IMM.  GRANS. 0.2 (H) 0.00 - 0.04 K/UL    DF AUTOMATED     RENAL FUNCTION PANEL    Collection Time: 08/09/21 10:50 AM   Result Value Ref Range    Sodium 142 136 - 145 mmol/L    Potassium 4.2 3.5 - 5.1 mmol/L    Chloride 112 (H) 97 - 108 mmol/L    CO2 27 21 - 32 mmol/L    Anion gap 3 (L) 5 - 15 mmol/L    Glucose 115 (H) 65 - 100 mg/dL    BUN 20 6 - 20 mg/dL    Creatinine 0.66 (L) 0.70 - 1.30 mg/dL    BUN/Creatinine ratio 30 (H) 12 - 20 GFR est AA >60 >60 ml/min/1.73m2    GFR est non-AA >60 >60 ml/min/1.73m2    Calcium 7.8 (L) 8.5 - 10.1 mg/dL    Phosphorus 3.0 2.6 - 4.7 mg/dL    Albumin 1.9 (L) 3.5 - 5.0 g/dL   HEPATIC FUNCTION PANEL    Collection Time: 08/09/21 10:50 AM   Result Value Ref Range    Protein, total 5.8 (L) 6.4 - 8.2 g/dL    Albumin 1.9 (L) 3.5 - 5.0 g/dL    Globulin 3.9 2.0 - 4.0 g/dL    A-G Ratio 0.5 (L) 1.1 - 2.2      Bilirubin, total 0.6 0.2 - 1.0 mg/dL    Bilirubin, direct 0.2 0.0 - 0.2 mg/dL    Alk.  phosphatase 105 45 - 117 U/L    AST (SGOT) 350 (H) 15 - 37 U/L    ALT (SGPT) 433 (H) 12 - 78 U/L   GLUCOSE, POC    Collection Time: 08/09/21 11:41 PM   Result Value Ref Range    Glucose (POC) 117 65 - 117 mg/dL    Performed by Jenny Mcclain    BLOOD GAS, ARTERIAL    Collection Time: 08/10/21  4:51 AM   Result Value Ref Range    pH 7.51 (H) 7.35 - 7.45      PCO2 34 (L) 35 - 45 mmHg    PO2 79 75 - 100 mmHg    O2 SAT 97 >95 %    BICARBONATE 28 (H) 22 - 26 mmol/L    BASE EXCESS 4.2 (H) 0 - 2 mmol/L    O2 METHOD VENT      FIO2 35.0 %    MODE SIMV      Tidal volume 450      PRESSURE SUPPORT 15.0      EPAP/CPAP/PEEP 5.0      Sample source Arterial      SITE Right Radial      KRUNAL'S TEST PASS     RENAL FUNCTION PANEL    Collection Time: 08/10/21  4:55 AM   Result Value Ref Range    Sodium 139 136 - 145 mmol/L    Potassium 4.2 3.5 - 5.1 mmol/L    Chloride 109 (H) 97 - 108 mmol/L    CO2 26 21 - 32 mmol/L    Anion gap 4 (L) 5 - 15 mmol/L    Glucose 120 (H) 65 - 100 mg/dL    BUN 20 6 - 20 mg/dL    Creatinine 0.54 (L) 0.70 - 1.30 mg/dL    BUN/Creatinine ratio 37 (H) 12 - 20      GFR est AA >60 >60 ml/min/1.73m2    GFR est non-AA >60 >60 ml/min/1.73m2    Calcium 8.2 (L) 8.5 - 10.1 mg/dL    Phosphorus 2.9 2.6 - 4.7 mg/dL    Albumin 1.9 (L) 3.5 - 5.0 g/dL   CBC WITH AUTOMATED DIFF    Collection Time: 08/10/21  4:55 AM   Result Value Ref Range    WBC 19.0 (H) 4.1 - 11.1 K/uL    RBC 3.85 (L) 4.10 - 5.70 M/uL    HGB 11.4 (L) 12.1 - 17.0 g/dL    HCT 34.2 (L) 36.6 - 50.3 %    MCV 88.8 80.0 - 99.0 FL    MCH 29.6 26.0 - 34.0 PG    MCHC 33.3 30.0 - 36.5 g/dL    RDW 13.1 11.5 - 14.5 %    PLATELET 224 824 - 179 K/uL    MPV 12.1 8.9 - 12.9 FL    NRBC 0.0 0.0  WBC    ABSOLUTE NRBC 0.00 0.00 - 0.01 K/uL    NEUTROPHILS 94 (H) 32 - 75 %    LYMPHOCYTES 3 (L) 12 - 49 %    MONOCYTES 2 (L) 5 - 13 %    EOSINOPHILS 0 0 - 7 %    BASOPHILS 0 0 - 1 %    IMMATURE GRANULOCYTES 1 (H) 0 - 0.5 %    ABS. NEUTROPHILS 17.8 (H) 1.8 - 8.0 K/UL    ABS. LYMPHOCYTES 0.5 (L) 0.8 - 3.5 K/UL    ABS. MONOCYTES 0.4 0.0 - 1.0 K/UL    ABS. EOSINOPHILS 0.0 0.0 - 0.4 K/UL    ABS. BASOPHILS 0.0 0.0 - 0.1 K/UL    ABS. IMM. GRANS. 0.2 (H) 0.00 - 0.04 K/UL    DF AUTOMATED     METABOLIC PANEL, COMPREHENSIVE    Collection Time: 08/10/21  4:55 AM   Result Value Ref Range    Sodium 142 136 - 145 mmol/L    Potassium 3.8 3.5 - 5.1 mmol/L    Chloride 111 (H) 97 - 108 mmol/L    CO2 27 21 - 32 mmol/L    Anion gap 4 (L) 5 - 15 mmol/L    Glucose 129 (H) 65 - 100 mg/dL    BUN 19 6 - 20 mg/dL    Creatinine 0.59 (L) 0.70 - 1.30 mg/dL    BUN/Creatinine ratio 32 (H) 12 - 20      GFR est AA >60 >60 ml/min/1.73m2    GFR est non-AA >60 >60 ml/min/1.73m2    Calcium 8.0 (L) 8.5 - 10.1 mg/dL    Bilirubin, total 0.5 0.2 - 1.0 mg/dL    AST (SGOT) 236 (H) 15 - 37 U/L    ALT (SGPT) 377 (H) 12 - 78 U/L    Alk.  phosphatase 102 45 - 117 U/L    Protein, total 5.9 (L) 6.4 - 8.2 g/dL    Albumin 2.0 (L) 3.5 - 5.0 g/dL    Globulin 3.9 2.0 - 4.0 g/dL    A-G Ratio 0.5 (L) 1.1 - 2.2     GLUCOSE, POC    Collection Time: 08/10/21  6:37 AM   Result Value Ref Range    Glucose (POC) 102 65 - 117 mg/dL    Performed by Gricel Winter        Visit Vitals  /74 (BP 1 Location: Left upper arm, BP Patient Position: At rest)   Pulse 84   Temp 98.3 °F (36.8 °C)   Resp 23   Ht 5' 5\" (1.651 m)   Wt 79.5 kg (175 lb 4.3 oz)   SpO2 98%   BMI 29.17 kg/m²       Imaging:  XR CHEST PORT   Final Result      XR CHEST PORT   Final Result   No significant interval change. XR CHEST PORT   Final Result   No significant change. XR CHEST PORT   Final Result   FINDINGS/IMPRESSION:   Support lines and tubes are appropriate in radiographic position. Persistent airspace disease throughout the left lung, likely pneumonia. Cardiac   silhouette stable in size. Hemodynamic status stable. XR CHEST PORT   Final Result      DUPLEX CAROTID BILATERAL   Final Result      XR CHEST PORT   Final Result      XR CHEST PORT   Final Result   ET tube placed, with tip 7 mm above the doretha. Asymmetric airspace   disease noted throughout the left lung, consistent with pneumonia, improved from   previous. Right lung clear. Stable normal heart size. Calcified aortic knob. XR CHEST PORT   Final Result   New diffuse airspace opacity in the left lung. MRI BRAIN WO CONT   Final Result   1. Restricted diffusion involving the cerebellar hemispheres, nemesio, and   possibly the left midbrain most likely representing acute ischemic infarcts   although the brainstem involvement may be partially artifactual. Consider a   short-term interval follow-up MRI. 2.  Attenuated vertebrobasilar flow voids which may be related to underlying   stenosis. CTA or MRA is recommended for further evaluation. 3.  Extensive multifocal encephalomalacia as described above. Report called to the patient's ICU nurse, Mireille Ash at 5:45 PM on   8/4/2021 via phone conversation. XR CHEST PORT   Final Result   The cardiomediastinal silhouette is appropriate for age, technique,   and lung expansion. Pulmonary vasculature is not congested. The lungs are   essentially clear. No effusion or pneumothorax is seen. CT CODE NEURO HEAD WO CONTRAST   Final Result      No acute intracranial bleed. Bilateral areas of encephalomalacia. Atherosclerosis. A noncontrast head CT does not exclude the possibility of an   acute ischemic stroke.  MRI is more sensitive for evaluation of brain parenchyma. Clinical correlation is recommended. Follow-up as clinically indicated. The   results were called and verbally communicated to Dr. Cam Padron on 8/4/2021 at   1:00 AM.      Dental disease. Please see full report.       XR CHEST PORT    (Results Pending)

## 2021-08-10 NOTE — MED STUDENT NOTES
General Daily Progress Note          Patient Name:   Marla Perez       YOB: 1955       Age:  76 y.o. Admit Date: 8/4/2021      Subjective:   Patient admitted five days ago with altered mental status has remained unresponsive.  Patient's son reports that the patient's baseline, he is walking and talking.  A follow-up MRI revealed acute infarction of cerebellar hemispheres, nemesio and midbrian.  Chest x-ray since admission shows extensive new left-sided infiltrate, he is currently on the ventilator. Per Pulmonology, the patient has decreased vent support this morning, respirations have increase and O2 saturation had dropped, but has returned to 98% saturation per recent vitals. He is in the CCU, with no changes in mentation today. He remains intubated ad on ventilator at 40% O2      Objective:     Visit Vitals  /74 (BP 1 Location: Left upper arm, BP Patient Position: At rest)   Pulse 84   Temp 98.3 °F (36.8 °C)   Resp 23   Ht 5' 5\" (1.651 m)   Wt 175 lb 4.3 oz (79.5 kg)   SpO2 98%   BMI 29.17 kg/m²        Recent Results (from the past 24 hour(s))   MAGNESIUM    Collection Time: 08/09/21 10:50 AM   Result Value Ref Range    Magnesium 2.4 1.6 - 2.4 mg/dL   CBC WITH AUTOMATED DIFF    Collection Time: 08/09/21 10:50 AM   Result Value Ref Range    WBC 17.9 (H) 4.1 - 11.1 K/uL    RBC 3.89 (L) 4.10 - 5.70 M/uL    HGB 11.6 (L) 12.1 - 17.0 g/dL    HCT 35.2 (L) 36.6 - 50.3 %    MCV 90.5 80.0 - 99.0 FL    MCH 29.8 26.0 - 34.0 PG    MCHC 33.0 30.0 - 36.5 g/dL    RDW 13.2 11.5 - 14.5 %    PLATELET 026 (L) 013 - 400 K/uL    MPV 12.1 8.9 - 12.9 FL    NRBC 0.0 0.0  WBC    ABSOLUTE NRBC 0.00 0.00 - 0.01 K/uL    NEUTROPHILS 94 (H) 32 - 75 %    LYMPHOCYTES 2 (L) 12 - 49 %    MONOCYTES 3 (L) 5 - 13 %    EOSINOPHILS 0 0 - 7 %    BASOPHILS 0 0 - 1 %    IMMATURE GRANULOCYTES 1 (H) 0 - 0.5 %    ABS. NEUTROPHILS 16.8 (H) 1.8 - 8.0 K/UL    ABS. LYMPHOCYTES 0.3 (L) 0.8 - 3.5 K/UL    ABS.  MONOCYTES 0.5 0.0 - 1.0 K/UL    ABS. EOSINOPHILS 0.0 0.0 - 0.4 K/UL    ABS. BASOPHILS 0.0 0.0 - 0.1 K/UL    ABS. IMM. GRANS. 0.2 (H) 0.00 - 0.04 K/UL    DF AUTOMATED     RENAL FUNCTION PANEL    Collection Time: 08/09/21 10:50 AM   Result Value Ref Range    Sodium 142 136 - 145 mmol/L    Potassium 4.2 3.5 - 5.1 mmol/L    Chloride 112 (H) 97 - 108 mmol/L    CO2 27 21 - 32 mmol/L    Anion gap 3 (L) 5 - 15 mmol/L    Glucose 115 (H) 65 - 100 mg/dL    BUN 20 6 - 20 mg/dL    Creatinine 0.66 (L) 0.70 - 1.30 mg/dL    BUN/Creatinine ratio 30 (H) 12 - 20      GFR est AA >60 >60 ml/min/1.73m2    GFR est non-AA >60 >60 ml/min/1.73m2    Calcium 7.8 (L) 8.5 - 10.1 mg/dL    Phosphorus 3.0 2.6 - 4.7 mg/dL    Albumin 1.9 (L) 3.5 - 5.0 g/dL   HEPATIC FUNCTION PANEL    Collection Time: 08/09/21 10:50 AM   Result Value Ref Range    Protein, total 5.8 (L) 6.4 - 8.2 g/dL    Albumin 1.9 (L) 3.5 - 5.0 g/dL    Globulin 3.9 2.0 - 4.0 g/dL    A-G Ratio 0.5 (L) 1.1 - 2.2      Bilirubin, total 0.6 0.2 - 1.0 mg/dL    Bilirubin, direct 0.2 0.0 - 0.2 mg/dL    Alk.  phosphatase 105 45 - 117 U/L    AST (SGOT) 350 (H) 15 - 37 U/L    ALT (SGPT) 433 (H) 12 - 78 U/L   GLUCOSE, POC    Collection Time: 08/09/21 11:41 PM   Result Value Ref Range    Glucose (POC) 117 65 - 117 mg/dL    Performed by Emeli Lewis    BLOOD GAS, ARTERIAL    Collection Time: 08/10/21  4:51 AM   Result Value Ref Range    pH 7.51 (H) 7.35 - 7.45      PCO2 34 (L) 35 - 45 mmHg    PO2 79 75 - 100 mmHg    O2 SAT 97 >95 %    BICARBONATE 28 (H) 22 - 26 mmol/L    BASE EXCESS 4.2 (H) 0 - 2 mmol/L    O2 METHOD VENT      FIO2 35.0 %    MODE SIMV      Tidal volume 450      PRESSURE SUPPORT 15.0      EPAP/CPAP/PEEP 5.0      Sample source Arterial      SITE Right Radial      KRUNAL'S TEST PASS     RENAL FUNCTION PANEL    Collection Time: 08/10/21  4:55 AM   Result Value Ref Range    Sodium 139 136 - 145 mmol/L    Potassium 4.2 3.5 - 5.1 mmol/L    Chloride 109 (H) 97 - 108 mmol/L    CO2 26 21 - 32 mmol/L    Anion gap 4 (L) 5 - 15 mmol/L    Glucose 120 (H) 65 - 100 mg/dL    BUN 20 6 - 20 mg/dL    Creatinine 0.54 (L) 0.70 - 1.30 mg/dL    BUN/Creatinine ratio 37 (H) 12 - 20      GFR est AA >60 >60 ml/min/1.73m2    GFR est non-AA >60 >60 ml/min/1.73m2    Calcium 8.2 (L) 8.5 - 10.1 mg/dL    Phosphorus 2.9 2.6 - 4.7 mg/dL    Albumin 1.9 (L) 3.5 - 5.0 g/dL   CBC WITH AUTOMATED DIFF    Collection Time: 08/10/21  4:55 AM   Result Value Ref Range    WBC 19.0 (H) 4.1 - 11.1 K/uL    RBC 3.85 (L) 4.10 - 5.70 M/uL    HGB 11.4 (L) 12.1 - 17.0 g/dL    HCT 34.2 (L) 36.6 - 50.3 %    MCV 88.8 80.0 - 99.0 FL    MCH 29.6 26.0 - 34.0 PG    MCHC 33.3 30.0 - 36.5 g/dL    RDW 13.1 11.5 - 14.5 %    PLATELET 218 979 - 115 K/uL    MPV 12.1 8.9 - 12.9 FL    NRBC 0.0 0.0  WBC    ABSOLUTE NRBC 0.00 0.00 - 0.01 K/uL    NEUTROPHILS 94 (H) 32 - 75 %    LYMPHOCYTES 3 (L) 12 - 49 %    MONOCYTES 2 (L) 5 - 13 %    EOSINOPHILS 0 0 - 7 %    BASOPHILS 0 0 - 1 %    IMMATURE GRANULOCYTES 1 (H) 0 - 0.5 %    ABS. NEUTROPHILS 17.8 (H) 1.8 - 8.0 K/UL    ABS. LYMPHOCYTES 0.5 (L) 0.8 - 3.5 K/UL    ABS. MONOCYTES 0.4 0.0 - 1.0 K/UL    ABS. EOSINOPHILS 0.0 0.0 - 0.4 K/UL    ABS. BASOPHILS 0.0 0.0 - 0.1 K/UL    ABS. IMM. GRANS. 0.2 (H) 0.00 - 0.04 K/UL    DF AUTOMATED     METABOLIC PANEL, COMPREHENSIVE    Collection Time: 08/10/21  4:55 AM   Result Value Ref Range    Sodium 142 136 - 145 mmol/L    Potassium 3.8 3.5 - 5.1 mmol/L    Chloride 111 (H) 97 - 108 mmol/L    CO2 27 21 - 32 mmol/L    Anion gap 4 (L) 5 - 15 mmol/L    Glucose 129 (H) 65 - 100 mg/dL    BUN 19 6 - 20 mg/dL    Creatinine 0.59 (L) 0.70 - 1.30 mg/dL    BUN/Creatinine ratio 32 (H) 12 - 20      GFR est AA >60 >60 ml/min/1.73m2    GFR est non-AA >60 >60 ml/min/1.73m2    Calcium 8.0 (L) 8.5 - 10.1 mg/dL    Bilirubin, total 0.5 0.2 - 1.0 mg/dL    AST (SGOT) 236 (H) 15 - 37 U/L    ALT (SGPT) 377 (H) 12 - 78 U/L    Alk.  phosphatase 102 45 - 117 U/L    Protein, total 5.9 (L) 6.4 - 8.2 g/dL    Albumin 2.0 (L) 3.5 - 5.0 g/dL    Globulin 3.9 2.0 - 4.0 g/dL    A-G Ratio 0.5 (L) 1.1 - 2.2     GLUCOSE, POC    Collection Time: 08/10/21  6:37 AM   Result Value Ref Range    Glucose (POC) 102 65 - 117 mg/dL    Performed by Henrik Baez      [unfilled]      Review of Systems    Constitutional: Negative for chills and fever. HENT: Negative. Eyes: Negative. Respiratory: Negative. Cardiovascular: Negative. Gastrointestinal: Negative for abdominal pain and nausea. Skin: Negative. Neurological: Negative. Physical Exam:      Constitutional: pt is oriented to person, place, and time. HENT:   Head: Normocephalic and atraumatic. Eyes: Pupils are equal, round, and reactive to light. EOM are normal.   Cardiovascular: Normal rate, regular rhythm and normal heart sounds. Pulmonary/Chest: Breath sounds normal. No wheezes. No rales. Exhibits no tenderness. Abdominal: Soft. Bowel sounds are normal. There is no abdominal tenderness. There is no rebound and no guarding. Musculoskeletal: Normal range of motion. Neurological: pt is alert and oriented to person, place, and time. XR CHEST PORT   Final Result      XR CHEST PORT   Final Result   No significant interval change. XR CHEST PORT   Final Result   No significant change. XR CHEST PORT   Final Result   FINDINGS/IMPRESSION:   Support lines and tubes are appropriate in radiographic position. Persistent airspace disease throughout the left lung, likely pneumonia. Cardiac   silhouette stable in size. Hemodynamic status stable. XR CHEST PORT   Final Result      DUPLEX CAROTID BILATERAL   Final Result      XR CHEST PORT   Final Result      XR CHEST PORT   Final Result   ET tube placed, with tip 7 mm above the doretha. Asymmetric airspace   disease noted throughout the left lung, consistent with pneumonia, improved from   previous. Right lung clear. Stable normal heart size. Calcified aortic knob.       XR CHEST PORT   Final Result New diffuse airspace opacity in the left lung. MRI BRAIN WO CONT   Final Result   1. Restricted diffusion involving the cerebellar hemispheres, nemesio, and   possibly the left midbrain most likely representing acute ischemic infarcts   although the brainstem involvement may be partially artifactual. Consider a   short-term interval follow-up MRI. 2.  Attenuated vertebrobasilar flow voids which may be related to underlying   stenosis. CTA or MRA is recommended for further evaluation. 3.  Extensive multifocal encephalomalacia as described above. Report called to the patient's ICU nurse, [facility] Dr. Jeffery Chandra at 5:45 PM on   8/4/2021 via phone conversation. XR CHEST PORT   Final Result   The cardiomediastinal silhouette is appropriate for age, technique,   and lung expansion. Pulmonary vasculature is not congested. The lungs are   essentially clear. No effusion or pneumothorax is seen. CT CODE NEURO HEAD WO CONTRAST   Final Result      No acute intracranial bleed. Bilateral areas of encephalomalacia. Atherosclerosis. A noncontrast head CT does not exclude the possibility of an   acute ischemic stroke. MRI is more sensitive for evaluation of brain parenchyma. Clinical correlation is recommended. Follow-up as clinically indicated. The   results were called and verbally communicated to Dr. Michelle Metzger on 8/4/2021 at   1:00 AM.      Dental disease. Please see full report.       XR CHEST PORT    (Results Pending)        Recent Results (from the past 24 hour(s))   MAGNESIUM    Collection Time: 08/09/21 10:50 AM   Result Value Ref Range    Magnesium 2.4 1.6 - 2.4 mg/dL   CBC WITH AUTOMATED DIFF    Collection Time: 08/09/21 10:50 AM   Result Value Ref Range    WBC 17.9 (H) 4.1 - 11.1 K/uL    RBC 3.89 (L) 4.10 - 5.70 M/uL    HGB 11.6 (L) 12.1 - 17.0 g/dL    HCT 35.2 (L) 36.6 - 50.3 %    MCV 90.5 80.0 - 99.0 FL    MCH 29.8 26.0 - 34.0 PG    MCHC 33.0 30.0 - 36.5 g/dL    RDW 13.2 11.5 - 14.5 % PLATELET 744 (L) 466 - 400 K/uL    MPV 12.1 8.9 - 12.9 FL    NRBC 0.0 0.0  WBC    ABSOLUTE NRBC 0.00 0.00 - 0.01 K/uL    NEUTROPHILS 94 (H) 32 - 75 %    LYMPHOCYTES 2 (L) 12 - 49 %    MONOCYTES 3 (L) 5 - 13 %    EOSINOPHILS 0 0 - 7 %    BASOPHILS 0 0 - 1 %    IMMATURE GRANULOCYTES 1 (H) 0 - 0.5 %    ABS. NEUTROPHILS 16.8 (H) 1.8 - 8.0 K/UL    ABS. LYMPHOCYTES 0.3 (L) 0.8 - 3.5 K/UL    ABS. MONOCYTES 0.5 0.0 - 1.0 K/UL    ABS. EOSINOPHILS 0.0 0.0 - 0.4 K/UL    ABS. BASOPHILS 0.0 0.0 - 0.1 K/UL    ABS. IMM. GRANS. 0.2 (H) 0.00 - 0.04 K/UL    DF AUTOMATED     RENAL FUNCTION PANEL    Collection Time: 08/09/21 10:50 AM   Result Value Ref Range    Sodium 142 136 - 145 mmol/L    Potassium 4.2 3.5 - 5.1 mmol/L    Chloride 112 (H) 97 - 108 mmol/L    CO2 27 21 - 32 mmol/L    Anion gap 3 (L) 5 - 15 mmol/L    Glucose 115 (H) 65 - 100 mg/dL    BUN 20 6 - 20 mg/dL    Creatinine 0.66 (L) 0.70 - 1.30 mg/dL    BUN/Creatinine ratio 30 (H) 12 - 20      GFR est AA >60 >60 ml/min/1.73m2    GFR est non-AA >60 >60 ml/min/1.73m2    Calcium 7.8 (L) 8.5 - 10.1 mg/dL    Phosphorus 3.0 2.6 - 4.7 mg/dL    Albumin 1.9 (L) 3.5 - 5.0 g/dL   HEPATIC FUNCTION PANEL    Collection Time: 08/09/21 10:50 AM   Result Value Ref Range    Protein, total 5.8 (L) 6.4 - 8.2 g/dL    Albumin 1.9 (L) 3.5 - 5.0 g/dL    Globulin 3.9 2.0 - 4.0 g/dL    A-G Ratio 0.5 (L) 1.1 - 2.2      Bilirubin, total 0.6 0.2 - 1.0 mg/dL    Bilirubin, direct 0.2 0.0 - 0.2 mg/dL    Alk.  phosphatase 105 45 - 117 U/L    AST (SGOT) 350 (H) 15 - 37 U/L    ALT (SGPT) 433 (H) 12 - 78 U/L   GLUCOSE, POC    Collection Time: 08/09/21 11:41 PM   Result Value Ref Range    Glucose (POC) 117 65 - 117 mg/dL    Performed by Nic [facility]    BLOOD GAS, ARTERIAL    Collection Time: 08/10/21  4:51 AM   Result Value Ref Range    pH 7.51 (H) 7.35 - 7.45      PCO2 34 (L) 35 - 45 mmHg    PO2 79 75 - 100 mmHg    O2 SAT 97 >95 %    BICARBONATE 28 (H) 22 - 26 mmol/L    BASE EXCESS 4.2 (H) 0 - 2 mmol/L O2 METHOD VENT      FIO2 35.0 %    MODE SIMV      Tidal volume 450      PRESSURE SUPPORT 15.0      EPAP/CPAP/PEEP 5.0      Sample source Arterial      SITE Right Radial      KRUNAL'S TEST PASS     RENAL FUNCTION PANEL    Collection Time: 08/10/21  4:55 AM   Result Value Ref Range    Sodium 139 136 - 145 mmol/L    Potassium 4.2 3.5 - 5.1 mmol/L    Chloride 109 (H) 97 - 108 mmol/L    CO2 26 21 - 32 mmol/L    Anion gap 4 (L) 5 - 15 mmol/L    Glucose 120 (H) 65 - 100 mg/dL    BUN 20 6 - 20 mg/dL    Creatinine 0.54 (L) 0.70 - 1.30 mg/dL    BUN/Creatinine ratio 37 (H) 12 - 20      GFR est AA >60 >60 ml/min/1.73m2    GFR est non-AA >60 >60 ml/min/1.73m2    Calcium 8.2 (L) 8.5 - 10.1 mg/dL    Phosphorus 2.9 2.6 - 4.7 mg/dL    Albumin 1.9 (L) 3.5 - 5.0 g/dL   CBC WITH AUTOMATED DIFF    Collection Time: 08/10/21  4:55 AM   Result Value Ref Range    WBC 19.0 (H) 4.1 - 11.1 K/uL    RBC 3.85 (L) 4.10 - 5.70 M/uL    HGB 11.4 (L) 12.1 - 17.0 g/dL    HCT 34.2 (L) 36.6 - 50.3 %    MCV 88.8 80.0 - 99.0 FL    MCH 29.6 26.0 - 34.0 PG    MCHC 33.3 30.0 - 36.5 g/dL    RDW 13.1 11.5 - 14.5 %    PLATELET 174 157 - 058 K/uL    MPV 12.1 8.9 - 12.9 FL    NRBC 0.0 0.0  WBC    ABSOLUTE NRBC 0.00 0.00 - 0.01 K/uL    NEUTROPHILS 94 (H) 32 - 75 %    LYMPHOCYTES 3 (L) 12 - 49 %    MONOCYTES 2 (L) 5 - 13 %    EOSINOPHILS 0 0 - 7 %    BASOPHILS 0 0 - 1 %    IMMATURE GRANULOCYTES 1 (H) 0 - 0.5 %    ABS. NEUTROPHILS 17.8 (H) 1.8 - 8.0 K/UL    ABS. LYMPHOCYTES 0.5 (L) 0.8 - 3.5 K/UL    ABS. MONOCYTES 0.4 0.0 - 1.0 K/UL    ABS. EOSINOPHILS 0.0 0.0 - 0.4 K/UL    ABS. BASOPHILS 0.0 0.0 - 0.1 K/UL    ABS. IMM.  GRANS. 0.2 (H) 0.00 - 0.04 K/UL    DF AUTOMATED     METABOLIC PANEL, COMPREHENSIVE    Collection Time: 08/10/21  4:55 AM   Result Value Ref Range    Sodium 142 136 - 145 mmol/L    Potassium 3.8 3.5 - 5.1 mmol/L    Chloride 111 (H) 97 - 108 mmol/L    CO2 27 21 - 32 mmol/L    Anion gap 4 (L) 5 - 15 mmol/L    Glucose 129 (H) 65 - 100 mg/dL BUN 19 6 - 20 mg/dL    Creatinine 0.59 (L) 0.70 - 1.30 mg/dL    BUN/Creatinine ratio 32 (H) 12 - 20      GFR est AA >60 >60 ml/min/1.73m2    GFR est non-AA >60 >60 ml/min/1.73m2    Calcium 8.0 (L) 8.5 - 10.1 mg/dL    Bilirubin, total 0.5 0.2 - 1.0 mg/dL    AST (SGOT) 236 (H) 15 - 37 U/L    ALT (SGPT) 377 (H) 12 - 78 U/L    Alk.  phosphatase 102 45 - 117 U/L    Protein, total 5.9 (L) 6.4 - 8.2 g/dL    Albumin 2.0 (L) 3.5 - 5.0 g/dL    Globulin 3.9 2.0 - 4.0 g/dL    A-G Ratio 0.5 (L) 1.1 - 2.2     GLUCOSE, POC    Collection Time: 08/10/21  6:37 AM   Result Value Ref Range    Glucose (POC) 102 65 - 117 mg/dL    Performed by Eda Rajan        Results     Procedure Component Value Units Date/Time    CULTURE, BLOOD, LINE [[id number]] Collected: 08/05/21 0102    Order Status: Completed Specimen: Blood Updated: 08/10/21 0839     Special Requests: No Special Requests        Culture result: No growth 4 days       CULTURE, RESPIRATORY/SPUTUM/BRONCH [place] STAIN [[id number]] Collected: 08/04/21 2230    Order Status: Completed Specimen: Sputum Updated: 08/07/21 1134     Special Requests: No Special Requests        GRAM STAIN Rare WBCs seen         Rare EPITHELIAL CELLS               Occasional Gram Positive Cocci in clusters                  Occasional Gram Negative Rods           Culture result:       Light Normal respiratory jesus          CULTURE, URINE [[id number]] Collected: 08/04/21 1841    Order Status: Completed Specimen: Urine Updated: 08/06/21 0729     Special Requests: No Special Requests        Culture result: No Growth (<1000 cfu/mL)       MRSA SCREEN - PCR (NASAL) [[id number]] Collected: 08/04/21 1841    Order Status: Completed Specimen: Swab Updated: 08/05/21 0027     MRSA by PCR, Nasal Not Detected       CULTURE, URINE [[id number]] Collected: 08/04/21 1100    Order Status: Completed Specimen: Urine Updated: 08/05/21 1416     Special Requests: No Special Requests        Culture result:       No significant growth, <10,000 CFU/mL          CULTURE, BLOOD #2 [[id number]] Collected: 08/04/21 1025    Order Status: Completed Specimen: Blood Updated: 08/10/21 0839     Special Requests: No Special Requests        Culture result: No growth 6 days       CULTURE, BLOOD #1 [[id number]] Collected: 08/04/21 1020    Order Status: Completed Specimen: Blood Updated: 08/10/21 0839     Special Requests: No Special Requests        Culture result: No growth 6 days              Labs:     Recent Labs     08/10/21  0455 08/09/21  1050   WBC 19.0* 17.9*   HGB 11.4* 11.6*   HCT 34.2* 35.2*    124*     Recent Labs     08/10/21  0455 08/09/21  1050 08/08/21  0500     139 142 142   K 3.8  4.2 4.2 4.1   *  109* 112* 111*   CO2 27  26 27 26   BUN 19  20 20 21*   CREA 0.59*  0.54* 0.66* 0.59*   *  120* 115* 140*   CA 8.0*  8.2* 7.8* 7.4*   MG  --  2.4  --    PHOS 2.9 3.0 1.9*     Recent Labs     08/10/21  0455 08/09/21  1050 08/08/21  0500   * 433*  --     105  --    TBILI 0.5 0.6  --    TP 5.9* 5.8*  --    ALB 2.0*  1.9* 1.9*  1.9* 2.0*   GLOB 3.9 3.9  --      No results for input(s): INR, PTP, APTT, INREXT in the last 72 hours. No results for input(s): FE, TIBC, PSAT, FERR in the last 72 hours. No results found for: FOL, RBCF   Recent Labs     08/10/21  0451 08/09/21  0515   PH 7.51* 7.55*   PCO2 34* 29*   PO2 79 107*     No results for input(s): CPK, CKNDX, TROIQ in the last 72 hours.     No lab exists for component: CPKMB  No results found for: CHOL, CHOLX, CHLST, CHOLV, HDL, HDLP, LDL, LDLC, DLDLP, TGLX, TRIGL, TRIGP, [facility], [facility]  Lab Results   Component Value Date/Time    Glucose (POC) 102 08/10/2021 06:37 AM    Glucose (POC) 117 08/09/2021 11:41 PM    Glucose (POC) 130 (H) 08/08/2021 08:41 PM    Glucose (POC) 147 (H) 08/08/2021 06:02 PM    Glucose (POC) 135 (H) 08/08/2021 11:45 AM     Lab Results   Component Value Date/Time    Color Yellow/Straw 08/04/2021 06:41 PM    Appearance Clear 08/04/2021 06:41 PM    Specific gravity 1.026 08/04/2021 06:41 PM    pH (UA) 6.0 08/04/2021 06:41 PM    Protein 30 (A) 08/04/2021 06:41 PM    Glucose 50 (A) 08/04/2021 06:41 PM    Ketone 5 (A) 08/04/2021 06:41 PM    Bilirubin Negative 08/04/2021 06:41 PM    Urobilinogen 4.0 (H) 08/04/2021 06:41 PM    Nitrites Negative 08/04/2021 06:41 PM    Leukocyte Esterase Negative 08/04/2021 06:41 PM    Bacteria Negative 08/04/2021 06:41 PM    WBC 0-4 08/04/2021 06:41 PM    RBC 0-5 08/04/2021 06:41 PM         Assessment:     Acute Brainstem and cerebellar infarct  Acute hypoxemic respiratory failure intubated 40%  Aspiration pneumonia  Sepsis  History of CVA  History of seizures  Acute kidney injury      Plan:   Patient in ICU on ventilator 40% O2  On propofol drip for sedation  On Keppra 500 IV every 12 hours for seizures  On IV Zosyn for aspiration pneumonia  On aspirin and Lipitor  On Decadron  Continue IV fluids     We continue NG tube feedings  Dietitian consult     Overall prognosis poor     Patient is still full code          Current Facility-Administered Medications:     heparin (porcine) injection 5,000 Units, 5,000 Units, SubCUTAneous, BID, Lupis Taylor MD, 5,000 Units at 08/10/21 8608    glycopyrrolate (ROBINUL) tablet 1 mg, 1 mg, Oral, BID, Greg Connor MD, 1 mg at 08/10/21 5968    zinc oxide-white petrolatum 17-57 % topical paste, , Topical, TID, Lupis Taylor MD, Given at 08/10/21 0848    levETIRAcetam (KEPPRA) 500 mg in saline (iso-osm) 100 mL IVPB (premix), 500 mg, IntraVENous, Q12H, Lupis Taylor MD, 500 mg at 08/10/21 0849    metoclopramide HCl (REGLAN) injection 10 mg, 10 mg, IntraVENous, Q4H PRN, Lupis Taylor MD    0.9% sodium chloride infusion, 40 mL/hr, IntraVENous, CONTINUOUS, Greg Connor MD, Last Rate: 40 mL/hr at 08/08/21 1054, 40 mL/hr at 08/08/21 1054    acetaminophen (TYLENOL) tablet 650 mg, 650 mg, Oral, Q6H PRN, 650 mg at 08/06/21 2110 **OR** acetaminophen (TYLENOL) suppository 650 mg, 650 mg, Rectal, Q6H PRN, Mohiuddin, Curtis Halsted, MD    polyethylene glycol (MIRALAX) packet 17 g, 17 g, Oral, DAILY PRN, Mohiuddin, Curtis Halsted, MD    ondansetron (ZOFRAN ODT) tablet 4 mg, 4 mg, Oral, Q8H PRN **OR** ondansetron (ZOFRAN) injection 4 mg, 4 mg, IntraVENous, Q6H PRN, Lupis Taylor MD    aspirin tablet 325 mg, 325 mg, Oral, DAILY, Lupis Taylor MD, 325 mg at 08/10/21 0849    atorvastatin (LIPITOR) tablet 40 mg, 40 mg, Oral, QHS, Lupis Taylor MD, 40 mg at 08/09/21 2239    piperacillin-tazobactam (ZOSYN) 3.375 g in 0.9% sodium chloride (MBP/ADV) 100 mL MBP, 3.375 g, IntraVENous, Q8H, Lupis Taylor MD, Last Rate: 200 mL/hr at 08/10/21 0300, 3.375 g at 08/10/21 0300    propofol (DIPRIVAN) 10 mg/mL infusion, 0-50 mcg/kg/min, IntraVENous, TITRATE, Najma Casarez MD, Last Rate: 7.9 mL/hr at 08/10/21 0852, 20 mcg/kg/min at 08/10/21 0852    dexamethasone (DECADRON) 4 mg/mL injection 4 mg, 4 mg, IntraVENous, Q6H, Najma Casarez MD, 4 mg at 08/10/21 4563    NOREPINephrine (LEVOPHED) 8 mg in 5% dextrose 250mL (32 mcg/mL) infusion, 10 mcg/min, IntraVENous, TITRATE, Mohiuddin, Curtis Halsted, MD, Stopped at 08/05/21 1111                                                                                              *ATTENTION:  This note has been created by a medical student for educational purposes only. Please do not refer to the content of this note for clinical decision-making, billing, or other purposes. Please see attending physicians note to obtain clinical information on this patient. *

## 2021-08-10 NOTE — PROGRESS NOTES
Consult  Pulmonary, Critical Care    Name: Elana Candelario MRN: [id number]   : 1955 Hospital: [facility]   Date: 8/10/2021  Admission date: 2021 Hospital Day: 7       Subjective/Interval History:   Patient admitted last night with altered mental status has remained unresponsive today. Some notes mention that he had vomiting at home before admission. His admission chest x-ray was relatively clear. He did go to MRI today came back to the floor Dr. Jewel Pino saw him at that time he had agonal respirations and anesthesia was called to intubate him. Chest x-ray shows extensive new left-sided infiltrate he is currently on the ventilator has been transferred to the ICU. Dr. Jewel Pino asked for me to see him for vent management     remains on the ventilator mildly sedated on propofol 10 mics. He has spontaneous respirations   remains on the ventilator reinstituted propofol due to cough and discoordination with the ventilator. He has self respirations  89 no change on the ventilator has self respirations remains on small dose propofol to avoid coughing against the ventilator   810 decrease vent support this morning respirations have increased and oxygen saturation dropped    Patient Active Problem List   Diagnosis Code    Altered mental status R41.82    Dysarthria R47.1    AMS (altered mental status) R41.82       IMPRESSION:   1. Acute hypoxic respiratory failure remains on mechanical ventilator FiO2 increased  back to 40%  2. Aspiration pneumonia extensive left lung and right base left base clearing  3. Hypotension questionably sepsis resolved  4. Extensive acute CVA  cerebellar and brainstem seen on MRI remains unresponsive  5. Acute kidney injury resolved creatinine now normal  6. Elevated troponin probably acute myocardial infarct from anoxia  7. Hypophosphatemia  repleted   8. Hypocalcemia repleated  9. History seizure disorder  10.  History intracranial aneurysm repair years ago  6. Chronic left hemiparesis  Body mass index is 29.17 kg/m². 12.       RECOMMENDATIONS/PLAN:   1. Have increased ventilatory support due to tachypnea  2. Continue IV Zosyn  3. Continue tube feedings  4. Continue IV fluids  5.   6.          Subjective/Initial History:       I was asked by Jose Luis Fritz MD to see Marla Perez a 76 y.o.  male  in consultation for a chief complaint of acute hypoxic respiratory failure aspiration pneumonia      The patient is unable to give any meaningful history or review of systems due to patient factors. Patient PCP: None  PMH:  has a past medical history of CVA (cerebral vascular accident) ([facility] [address] and Seizure ([facility] [address] PSH:   has no past surgical history on file. FHX: family history is not on file. SHX:  reports that he has quit smoking. He has never used smokeless tobacco. He reports previous alcohol use. He reports that he does not use drugs.     Systemic review unobtainable as patient is unresponsive on the ventilator    No Known Allergies   MEDS:   Current Facility-Administered Medications   Medication    heparin (porcine) injection 5,000 Units    glycopyrrolate (ROBINUL) tablet 1 mg    zinc oxide-white petrolatum 17-57 % topical paste    levETIRAcetam (KEPPRA) 500 mg in saline (iso-osm) 100 mL IVPB (premix)    metoclopramide HCl (REGLAN) injection 10 mg    0.9% sodium chloride infusion    acetaminophen (TYLENOL) tablet 650 mg    Or    acetaminophen (TYLENOL) suppository 650 mg    polyethylene glycol (MIRALAX) packet 17 g    ondansetron (ZOFRAN ODT) tablet 4 mg    Or    ondansetron (ZOFRAN) injection 4 mg    aspirin tablet 325 mg    atorvastatin (LIPITOR) tablet 40 mg    piperacillin-tazobactam (ZOSYN) 3.375 g in 0.9% sodium chloride (MBP/ADV) 100 mL MBP    propofol (DIPRIVAN) 10 mg/mL infusion    dexamethasone (DECADRON) 4 mg/mL injection 4 mg    NOREPINephrine (LEVOPHED) 8 mg in 5% dextrose 250mL (32 mcg/mL) infusion Current Facility-Administered Medications:     heparin (porcine) injection 5,000 Units, 5,000 Units, SubCUTAneous, BID, Lupis Taylor MD, 5,000 Units at 08/10/21 8019    glycopyrrolate (ROBINUL) tablet 1 mg, 1 mg, Oral, BID, Leonora Martin MD, 1 mg at 08/10/21 6062    zinc oxide-white petrolatum 17-57 % topical paste, , Topical, TID, Lupis Taylor MD, Given at 08/10/21 0848    levETIRAcetam (KEPPRA) 500 mg in saline (iso-osm) 100 mL IVPB (premix), 500 mg, IntraVENous, Q12H, Lupis Taylor MD, 500 mg at 08/10/21 0849    metoclopramide HCl (REGLAN) injection 10 mg, 10 mg, IntraVENous, Q4H PRN, Myron Taylor MD    0.9% sodium chloride infusion, 40 mL/hr, IntraVENous, CONTINUOUS, Leonora Martin MD, Last Rate: 40 mL/hr at 08/08/21 1054, 40 mL/hr at 08/08/21 1054    acetaminophen (TYLENOL) tablet 650 mg, 650 mg, Oral, Q6H PRN, 650 mg at 08/06/21 2110 **OR** acetaminophen (TYLENOL) suppository 650 mg, 650 mg, Rectal, Q6H PRN, Myron Taylor MD    polyethylene glycol (MIRALAX) packet 17 g, 17 g, Oral, DAILY PRN, Myron Taylor MD    ondansetron (ZOFRAN ODT) tablet 4 mg, 4 mg, Oral, Q8H PRN **OR** ondansetron (ZOFRAN) injection 4 mg, 4 mg, IntraVENous, Q6H PRN, Lupis Taylor MD    aspirin tablet 325 mg, 325 mg, Oral, DAILY, Lupis Taylor MD, 325 mg at 08/10/21 0849    atorvastatin (LIPITOR) tablet 40 mg, 40 mg, Oral, QHS, Lupis Taylor MD, 40 mg at 08/09/21 2239    piperacillin-tazobactam (ZOSYN) 3.375 g in 0.9% sodium chloride (MBP/ADV) 100 mL MBP, 3.375 g, IntraVENous, Q8H, Lupis Taylor MD, Last Rate: 200 mL/hr at 08/10/21 0300, 3.375 g at 08/10/21 0300    propofol (DIPRIVAN) 10 mg/mL infusion, 0-50 mcg/kg/min, IntraVENous, TITRATE, Leonora Martin MD, Last Rate: 7.9 mL/hr at 08/10/21 0852, 20 mcg/kg/min at 08/10/21 0852    dexamethasone (DECADRON) 4 mg/mL injection 4 mg, 4 mg, IntraVENous, Q6H, Leonora Martin MD, 4 mg at 08/10/21 0852    NOREPINephrine (LEVOPHED) 8 mg in 5% dextrose 250mL (32 mcg/mL) infusion, 10 mcg/min, IntraVENous, TITRATE, Lupis Taylor MD, Stopped at 21 1111      Objective:     Vital Signs: Telemetry:    normal sinus rhythm Intake/Output:   Visit Vitals  /74 (BP 1 Location: Left upper arm, BP Patient Position: At rest)   Pulse 84   Temp 98.3 °F (36.8 °C)   Resp 18   Ht 5' 5\" (1.651 m)   Wt 79.5 kg (175 lb 4.3 oz)   SpO2 95%   BMI 29.17 kg/m²       Temp (24hrs), Av.8 °F (37.1 °C), Min:98.2 °F (36.8 °C), Max:99.7 °F (37.6 °C)        O2 Device: Ventilator           Body mass index is 29.17 kg/m². Wt Readings from Last 4 Encounters:   08/10/21 79.5 kg (175 lb 4.3 oz)          Intake/Output Summary (Last 24 hours) at 8/10/2021 0856  Last data filed at 8/10/2021 0544  Gross per 24 hour   Intake 2093.67 ml   Output 925 ml   Net 1168.67 ml       Last shift:      No intake/output data recorded. Last 3 shifts:  1901 - 08/10 0700  In: 2643.7 [I.V.:1468.7]  Out:  [Urine:2000]       Hemodynamics:    CO:    CI:    CVP:    SVR:   PAP Systolic:    PAP Diastolic:    PVR:    LG84:       Ventilator Settings:      Mode Rate TV Press PEEP FiO2 PIP Min. Vent   SIMV, Volume control    450 ml 15 cm H2O  5 cm H20 35 %  22 cm H2O  12.1 l/min      Physical Exam:    General:  male; unresponsive on the ventilator   HEENT: NCAT, ET tube in place  Eyes: anicteric; conjunctiva clear no doll's eye reflex eyes are disconjugate  Neck: no nodes, no cuff leak, no accessory MM use. No JVD  Chest: no deformity,   Cardiac: Regular rate and rhythm  Lungs: Has spontaneous respiration relatively clear right anterior lateral and posterior, better breath sounds over the left chest with posterior rales   Abd: Soft a few bowel sounds no grimacing to palpation  Ext: no edema; no joint swelling; No clubbing  : clear urine  Neuro:  On the ventilator on propofol unresponsive no doll's eye reflex flaccid extremities has extremely pronounced posturing when stimulated  Psych-  unable to assess  Skin: warm, dry, no cyanosis;   Pulses: Brachial and radial pulses intact  Capillary: Normal capillary refill      Labs:    Recent Labs     08/10/21  0455 08/09/21  1050 08/08/21  0500   WBC 19.0* 17.9* 18.2*   HGB 11.4* 11.6* 11.8*    124* 131*     Recent Labs     08/10/21  0455 08/09/21  1050 08/08/21  0500     139 142 142   K 3.8  4.2 4.2 4.1   *  109* 112* 111*   CO2 27  26 27 26   *  120* 115* 140*   BUN 19  20 20 21*   CREA 0.59*  0.54* 0.66* 0.59*   CA 8.0*  8.2* 7.8* 7.4*   MG  --  2.4  --    PHOS 2.9 3.0 1.9*   ALB 2.0*  1.9* 1.9*  1.9* 2.0*   * 433*  --            Lab Results   Component Value Date/Time    Culture result: No growth 4 days 08/05/2021 01:02 AM    Culture result: Light Normal respiratory jesus 08/04/2021 10:30 PM    Culture result: No Growth (<1000 cfu/mL) 08/04/2021 06:41 PM   Urine no growth  Sputum rare polys with normal jesus  Blood no growth  Nasal MRSA smear negative    Imaging:  I have personally reviewed the patients radiographs and have reviewed the reports:    CXR Results  (Last 48 hours)  8/10 ET and NG tube remain in place extensive left lung infiltrate slightly less dense the left bases improved right medial base unchanged  8/7 chest x-ray ET and NG tube in place extensive left infiltrate with left base better inflation slowly clearing  8/6 chest x-ray ET NG tube in place extensive left infiltrate mildly improved  8/4 chest x-ray after intubation shows ET tube a little low left lung reinflated extensive infiltrate persist  8/4 radiology has called MRI or results as acute cerebellar and brainstem infarct               08/04/21 1710  XR CHEST PORT Final result    Impression:  New diffuse airspace opacity in the left lung. Narrative:  Chest, frontal view, 8/4/2021       History: Shortness of breath. Comparison: Chest, same day. Findings:  The cardiac silhouette is stable. The right lung is adequately   expanded and clear. The left lung has diffuse airspace opacity, new as compared   to study performed earlier the same day. Differential considerations include   aspiration, collapse secondary to mucous plugging. Clinical correlation is   recommended. Left pleural effusion is not excluded. No pneumothorax is   identified. Degenerative changes are present in the thoracic spine. 08/04/21 0853  XR CHEST PORT Final result    Impression:  The cardiomediastinal silhouette is appropriate for age, technique,   and lung expansion. Pulmonary vasculature is not congested. The lungs are   essentially clear. No effusion or pneumothorax is seen. Narrative:  1 view               Results from Hospital Encounter encounter on 08/04/21    XR CHEST PORT    Narrative  Chest single view. Comparison single view chest 8/9/2021. Stable ET tube and NG tube. Hazy opacity throughout the entire left lung and right lower lobe lung, similar  distribution compared to prior imaging. Cardiac and mediastinal structures  unchanged. No pneumothorax or sizable pleural effusion. XR CHEST PORT    Narrative  Chest, 2 frontal views, 8/9/2021    History: Aspiration pneumonia. Comparison: Including chest 8/8/2021. Findings: The chest is obscured by artifact. Endotracheal tube tip is 4.3 cm  from the doretha. Feeding tube tip is at the stomach. The cardiac silhouette is  stable. Lung volumes are not significantly changed. Airspace opacities  throughout the left lung and right base are not significantly changed. No  pleural effusion or pneumothorax identified. The osseous structures are stable. Impression  No significant interval change. XR CHEST PORT    Narrative  XR CHEST PORT    Comparison:  8/7/2021    Single view:  Stable airspace disease in the right infrahilar lung and diffusely  within the left lung suspicious for pneumonia.  No pneumothorax or large pleural  effusion apparent. The heart size is normal. Endotracheal tube tip in the  midthoracic trachea, unchanged. Enteric tube tip projects over the gastric  fundus, unchanged. Impression  No significant change. Results from [facility] encounter on 08/04/21    CT CODE NEURO HEAD WO CONTRAST    Narrative  CT head without contrast    Dose reduction: All CT scans at this facility are performed using dose reduction  optimization techniques as appropriate to a performed exam including the  following: Automated exposure control, adjustments of the mA and/or kV according  to patient size, or use of iterative reconstruction technique. INDICATION: Code neuro    FINDINGS:    No acute intracranial bleed or midline shift. Areas of encephalomalacia in the  right hemisphere and left frontal lobe. Probable white matter small vessel  ischemic changes. Mild ex vacuo dilatation of right lateral ventricle. MRI is  more sensitive for evaluation of brain parenchyma. Atherosclerosis. No acute  skull fracture. Postoperative changes right skull. No fluid in the paranasal  sinuses or mastoid air cells. Caries and periapical infection/abscess involving  multiple teeth. Impression  No acute intracranial bleed. Bilateral areas of encephalomalacia. Atherosclerosis. A noncontrast head CT does not exclude the possibility of an  acute ischemic stroke. MRI is more sensitive for evaluation of brain parenchyma. Clinical correlation is recommended. Follow-up as clinically indicated. The  results were called and verbally communicated to Dr. Pawel Wills on 8/4/2021 at  1:00 AM.    Dental disease. Please see full report. Discussion aspiration pneumonia with acute respiratory failure secondary to vomiting and brainstem and cerebellar infarct. Can add Decadron to try and prevent edema but it is not likely to help. 8/5 remains unresponsive does have some generalized movement when sternal rub.   Renal function has deteriorated questionable ATN. Chest x-ray shows extensive infiltrate from aspiration. Troponin has elevated. MRI results as mentioned with new acute cerebellar infarcts and probable brainstem as well recovery seems unlikely  8/6 remains unresponsive on the ventilator on small dose of propofol to avoid cough on ventilator. He does have spontaneous respirations. Sputum does show gram negative zeke a few gram-positive cocci nasal MRSA smear is negative we will maintain Zosyn  8/7 remains unresponsive on the ventilator. Propofol reintroduced at low rate as he was bucking the ventilator. No specific pathogen on sputum culture likely aspiration we will continue Zosyn phosphorus is low will replete  8/8 unchanged remains on the ventilator unresponsive continue propofol to avoid coughing against the ventilator. Phosphorus remains low. Creatinine normal will decrease IV fluids  8/9 unresponsive on the ventilator receiving low-dose propofol to prevent coughing against the ventilator has profound posturing when he is stimulated otherwise unresponsive. Chest x-ray still shows extensive infiltrate  8/10 no change unresponsive on the ventilator tachypnea. Chest x-ray slowly improving but extensive infiltrate remains  Time of care 30 minutes           Thank you for allowing us to participate in the care of this patient.   We will follow along with you    Zara Faye MD

## 2021-08-10 NOTE — PROGRESS NOTES
General Daily Progress Note          Patient Name:   Michael Hanna       YOB: 1955       Age:  76 y.o. Admit Date: 8/4/2021      Subjective:   Patient admitted five days ago with altered mental status has remained unresponsive.  Patient's son reports that the patient's baseline, he is walking and talking.  A follow-up MRI revealed acute infarction of cerebellar hemispheres, nemesio and midbrian.  Chest x-ray since admission shows extensive new left-sided infiltrate, he is currently on the ventilator. Per Pulmonology, the patient has decreased vent support this morning, respirations have increase and O2 saturation had dropped, but has returned to 98% saturation per recent vitals. He is in the CCU, with no changes in mentation today.  He remains intubated ad on ventilator at 50% O2      Objective:     Visit Vitals  /74 (BP 1 Location: Left upper arm, BP Patient Position: At rest)   Pulse 84   Temp 98.3 °F (36.8 °C)   Resp 23   Ht 5' 5\" (1.651 m)   Wt 79.5 kg (175 lb 4.3 oz)   SpO2 98%   BMI 29.17 kg/m²        Recent Results (from the past 24 hour(s))   GLUCOSE, POC    Collection Time: 08/09/21 11:41 PM   Result Value Ref Range    Glucose (POC) 117 65 - 117 mg/dL    Performed by Jenny Mcclain    BLOOD GAS, ARTERIAL    Collection Time: 08/10/21  4:51 AM   Result Value Ref Range    pH 7.51 (H) 7.35 - 7.45      PCO2 34 (L) 35 - 45 mmHg    PO2 79 75 - 100 mmHg    O2 SAT 97 >95 %    BICARBONATE 28 (H) 22 - 26 mmol/L    BASE EXCESS 4.2 (H) 0 - 2 mmol/L    O2 METHOD VENT      FIO2 35.0 %    MODE SIMV      Tidal volume 450      PRESSURE SUPPORT 15.0      EPAP/CPAP/PEEP 5.0      Sample source Arterial      SITE Right Radial      KRUNAL'S TEST PASS     RENAL FUNCTION PANEL    Collection Time: 08/10/21  4:55 AM   Result Value Ref Range    Sodium 139 136 - 145 mmol/L    Potassium 4.2 3.5 - 5.1 mmol/L    Chloride 109 (H) 97 - 108 mmol/L    CO2 26 21 - 32 mmol/L    Anion gap 4 (L) 5 - 15 mmol/L    Glucose 120 (H) 65 - 100 mg/dL    BUN 20 6 - 20 mg/dL    Creatinine 0.54 (L) 0.70 - 1.30 mg/dL    BUN/Creatinine ratio 37 (H) 12 - 20      GFR est AA >60 >60 ml/min/1.73m2    GFR est non-AA >60 >60 ml/min/1.73m2    Calcium 8.2 (L) 8.5 - 10.1 mg/dL    Phosphorus 2.9 2.6 - 4.7 mg/dL    Albumin 1.9 (L) 3.5 - 5.0 g/dL   CBC WITH AUTOMATED DIFF    Collection Time: 08/10/21  4:55 AM   Result Value Ref Range    WBC 19.0 (H) 4.1 - 11.1 K/uL    RBC 3.85 (L) 4.10 - 5.70 M/uL    HGB 11.4 (L) 12.1 - 17.0 g/dL    HCT 34.2 (L) 36.6 - 50.3 %    MCV 88.8 80.0 - 99.0 FL    MCH 29.6 26.0 - 34.0 PG    MCHC 33.3 30.0 - 36.5 g/dL    RDW 13.1 11.5 - 14.5 %    PLATELET 146 352 - 586 K/uL    MPV 12.1 8.9 - 12.9 FL    NRBC 0.0 0.0  WBC    ABSOLUTE NRBC 0.00 0.00 - 0.01 K/uL    NEUTROPHILS 94 (H) 32 - 75 %    LYMPHOCYTES 3 (L) 12 - 49 %    MONOCYTES 2 (L) 5 - 13 %    EOSINOPHILS 0 0 - 7 %    BASOPHILS 0 0 - 1 %    IMMATURE GRANULOCYTES 1 (H) 0 - 0.5 %    ABS. NEUTROPHILS 17.8 (H) 1.8 - 8.0 K/UL    ABS. LYMPHOCYTES 0.5 (L) 0.8 - 3.5 K/UL    ABS. MONOCYTES 0.4 0.0 - 1.0 K/UL    ABS. EOSINOPHILS 0.0 0.0 - 0.4 K/UL    ABS. BASOPHILS 0.0 0.0 - 0.1 K/UL    ABS. IMM. GRANS. 0.2 (H) 0.00 - 0.04 K/UL    DF AUTOMATED     METABOLIC PANEL, COMPREHENSIVE    Collection Time: 08/10/21  4:55 AM   Result Value Ref Range    Sodium 142 136 - 145 mmol/L    Potassium 3.8 3.5 - 5.1 mmol/L    Chloride 111 (H) 97 - 108 mmol/L    CO2 27 21 - 32 mmol/L    Anion gap 4 (L) 5 - 15 mmol/L    Glucose 129 (H) 65 - 100 mg/dL    BUN 19 6 - 20 mg/dL    Creatinine 0.59 (L) 0.70 - 1.30 mg/dL    BUN/Creatinine ratio 32 (H) 12 - 20      GFR est AA >60 >60 ml/min/1.73m2    GFR est non-AA >60 >60 ml/min/1.73m2    Calcium 8.0 (L) 8.5 - 10.1 mg/dL    Bilirubin, total 0.5 0.2 - 1.0 mg/dL    AST (SGOT) 236 (H) 15 - 37 U/L    ALT (SGPT) 377 (H) 12 - 78 U/L    Alk.  phosphatase 102 45 - 117 U/L    Protein, total 5.9 (L) 6.4 - 8.2 g/dL    Albumin 2.0 (L) 3.5 - 5.0 g/dL    Globulin 3.9 2.0 - 4.0 g/dL    A-G Ratio 0.5 (L) 1.1 - 2.2     GLUCOSE, POC    Collection Time: 08/10/21  6:37 AM   Result Value Ref Range    Glucose (POC) 102 65 - 117 mg/dL    Performed by Bony Ayoub      [unfilled]      Review of Systems    Constitutional: Negative for chills and fever. HENT: Negative. Eyes: Negative. Respiratory: Negative. Cardiovascular: Negative. Gastrointestinal: Negative for abdominal pain and nausea. Skin: Negative. Neurological: Negative. Physical Exam:      Constitutional: pt is oriented to person, place, and time. HENT:   Head: Normocephalic and atraumatic. Eyes: Pupils are equal, round, and reactive to light. EOM are normal.   Cardiovascular: Normal rate, regular rhythm and normal heart sounds. Pulmonary/Chest: Breath sounds normal. No wheezes. No rales. Exhibits no tenderness. Abdominal: Soft. Bowel sounds are normal. There is no abdominal tenderness. There is no rebound and no guarding. Musculoskeletal: Normal range of motion. Neurological: pt is alert and oriented to person, place, and time. XR CHEST PORT   Final Result      XR CHEST PORT   Final Result   No significant interval change. XR CHEST PORT   Final Result   No significant change. XR CHEST PORT   Final Result   FINDINGS/IMPRESSION:   Support lines and tubes are appropriate in radiographic position. Persistent airspace disease throughout the left lung, likely pneumonia. Cardiac   silhouette stable in size. Hemodynamic status stable. XR CHEST PORT   Final Result      DUPLEX CAROTID BILATERAL   Final Result      XR CHEST PORT   Final Result      XR CHEST PORT   Final Result   ET tube placed, with tip 7 mm above the doretha. Asymmetric airspace   disease noted throughout the left lung, consistent with pneumonia, improved from   previous. Right lung clear. Stable normal heart size. Calcified aortic knob.       XR CHEST PORT   Final Result   New diffuse airspace opacity in the left lung. MRI BRAIN WO CONT   Final Result   1. Restricted diffusion involving the cerebellar hemispheres, nemesio, and   possibly the left midbrain most likely representing acute ischemic infarcts   although the brainstem involvement may be partially artifactual. Consider a   short-term interval follow-up MRI. 2.  Attenuated vertebrobasilar flow voids which may be related to underlying   stenosis. CTA or MRA is recommended for further evaluation. 3.  Extensive multifocal encephalomalacia as described above. Report called to the patient's ICU nurse, Milton Ash at 5:45 PM on   8/4/2021 via phone conversation. XR CHEST PORT   Final Result   The cardiomediastinal silhouette is appropriate for age, technique,   and lung expansion. Pulmonary vasculature is not congested. The lungs are   essentially clear. No effusion or pneumothorax is seen. CT CODE NEURO HEAD WO CONTRAST   Final Result      No acute intracranial bleed. Bilateral areas of encephalomalacia. Atherosclerosis. A noncontrast head CT does not exclude the possibility of an   acute ischemic stroke. MRI is more sensitive for evaluation of brain parenchyma. Clinical correlation is recommended. Follow-up as clinically indicated. The   results were called and verbally communicated to Dr. Kyle Reyes on 8/4/2021 at   1:00 AM.      Dental disease. Please see full report.       XR CHEST PORT    (Results Pending)        Recent Results (from the past 24 hour(s))   GLUCOSE, POC    Collection Time: 08/09/21 11:41 PM   Result Value Ref Range    Glucose (POC) 117 65 - 117 mg/dL    Performed by Andrea Herzog    BLOOD GAS, ARTERIAL    Collection Time: 08/10/21  4:51 AM   Result Value Ref Range    pH 7.51 (H) 7.35 - 7.45      PCO2 34 (L) 35 - 45 mmHg    PO2 79 75 - 100 mmHg    O2 SAT 97 >95 %    BICARBONATE 28 (H) 22 - 26 mmol/L    BASE EXCESS 4.2 (H) 0 - 2 mmol/L    O2 METHOD VENT      FIO2 35.0 %    MODE SIMV      Tidal volume 450 PRESSURE SUPPORT 15.0      EPAP/CPAP/PEEP 5.0      Sample source Arterial      SITE Right Radial      KRUNAL'S TEST PASS     RENAL FUNCTION PANEL    Collection Time: 08/10/21  4:55 AM   Result Value Ref Range    Sodium 139 136 - 145 mmol/L    Potassium 4.2 3.5 - 5.1 mmol/L    Chloride 109 (H) 97 - 108 mmol/L    CO2 26 21 - 32 mmol/L    Anion gap 4 (L) 5 - 15 mmol/L    Glucose 120 (H) 65 - 100 mg/dL    BUN 20 6 - 20 mg/dL    Creatinine 0.54 (L) 0.70 - 1.30 mg/dL    BUN/Creatinine ratio 37 (H) 12 - 20      GFR est AA >60 >60 ml/min/1.73m2    GFR est non-AA >60 >60 ml/min/1.73m2    Calcium 8.2 (L) 8.5 - 10.1 mg/dL    Phosphorus 2.9 2.6 - 4.7 mg/dL    Albumin 1.9 (L) 3.5 - 5.0 g/dL   CBC WITH AUTOMATED DIFF    Collection Time: 08/10/21  4:55 AM   Result Value Ref Range    WBC 19.0 (H) 4.1 - 11.1 K/uL    RBC 3.85 (L) 4.10 - 5.70 M/uL    HGB 11.4 (L) 12.1 - 17.0 g/dL    HCT 34.2 (L) 36.6 - 50.3 %    MCV 88.8 80.0 - 99.0 FL    MCH 29.6 26.0 - 34.0 PG    MCHC 33.3 30.0 - 36.5 g/dL    RDW 13.1 11.5 - 14.5 %    PLATELET 199 397 - 587 K/uL    MPV 12.1 8.9 - 12.9 FL    NRBC 0.0 0.0  WBC    ABSOLUTE NRBC 0.00 0.00 - 0.01 K/uL    NEUTROPHILS 94 (H) 32 - 75 %    LYMPHOCYTES 3 (L) 12 - 49 %    MONOCYTES 2 (L) 5 - 13 %    EOSINOPHILS 0 0 - 7 %    BASOPHILS 0 0 - 1 %    IMMATURE GRANULOCYTES 1 (H) 0 - 0.5 %    ABS. NEUTROPHILS 17.8 (H) 1.8 - 8.0 K/UL    ABS. LYMPHOCYTES 0.5 (L) 0.8 - 3.5 K/UL    ABS. MONOCYTES 0.4 0.0 - 1.0 K/UL    ABS. EOSINOPHILS 0.0 0.0 - 0.4 K/UL    ABS. BASOPHILS 0.0 0.0 - 0.1 K/UL    ABS. IMM.  GRANS. 0.2 (H) 0.00 - 0.04 K/UL    DF AUTOMATED     METABOLIC PANEL, COMPREHENSIVE    Collection Time: 08/10/21  4:55 AM   Result Value Ref Range    Sodium 142 136 - 145 mmol/L    Potassium 3.8 3.5 - 5.1 mmol/L    Chloride 111 (H) 97 - 108 mmol/L    CO2 27 21 - 32 mmol/L    Anion gap 4 (L) 5 - 15 mmol/L    Glucose 129 (H) 65 - 100 mg/dL    BUN 19 6 - 20 mg/dL    Creatinine 0.59 (L) 0.70 - 1.30 mg/dL BUN/Creatinine ratio 32 (H) 12 - 20      GFR est AA >60 >60 ml/min/1.73m2    GFR est non-AA >60 >60 ml/min/1.73m2    Calcium 8.0 (L) 8.5 - 10.1 mg/dL    Bilirubin, total 0.5 0.2 - 1.0 mg/dL    AST (SGOT) 236 (H) 15 - 37 U/L    ALT (SGPT) 377 (H) 12 - 78 U/L    Alk.  phosphatase 102 45 - 117 U/L    Protein, total 5.9 (L) 6.4 - 8.2 g/dL    Albumin 2.0 (L) 3.5 - 5.0 g/dL    Globulin 3.9 2.0 - 4.0 g/dL    A-G Ratio 0.5 (L) 1.1 - 2.2     GLUCOSE, POC    Collection Time: 08/10/21  6:37 AM   Result Value Ref Range    Glucose (POC) 102 65 - 117 mg/dL    Performed by Andrea Herzog        Results     Procedure Component Value Units Date/Time    CULTURE, BLOOD, LINE [[id number]] Collected: 08/05/21 0102    Order Status: Completed Specimen: Blood Updated: 08/10/21 0839     Special Requests: No Special Requests        Culture result: No growth 4 days       CULTURE, RESPIRATORY/SPUTUM/BRONCH Colin [place]atte STAIN [[id number]] Collected: 08/04/21 2230    Order Status: Completed Specimen: Sputum Updated: 08/07/21 1134     Special Requests: No Special Requests        GRAM STAIN Rare WBCs seen         Rare EPITHELIAL CELLS               Occasional Gram Positive Cocci in clusters                  Occasional Gram Negative Rods           Culture result:       Light Normal respiratory jesus          CULTURE, URINE [[id number]] Collected: 08/04/21 1841    Order Status: Completed Specimen: Urine Updated: 08/06/21 0729     Special Requests: No Special Requests        Culture result: No Growth (<1000 cfu/mL)       MRSA SCREEN - PCR (NASAL) [[id number]] Collected: 08/04/21 1841    Order Status: Completed Specimen: Swab Updated: 08/05/21 0027     MRSA by PCR, Nasal Not Detected       CULTURE, URINE [[id number]] Collected: 08/04/21 1100    Order Status: Completed Specimen: Urine Updated: 08/05/21 1416     Special Requests: No Special Requests        Culture result:       No significant growth, <10,000 CFU/mL          CULTURE, BLOOD #2 [[id number]] Collected: 08/04/21 1025    Order Status: Completed Specimen: Blood Updated: 08/10/21 0839     Special Requests: No Special Requests        Culture result: No growth 6 days       CULTURE, BLOOD #1 [[id number]] Collected: 08/04/21 1020    Order Status: Completed Specimen: Blood Updated: 08/10/21 0839     Special Requests: No Special Requests        Culture result: No growth 6 days              Labs:     Recent Labs     08/10/21  0455 08/09/21  1050   WBC 19.0* 17.9*   HGB 11.4* 11.6*   HCT 34.2* 35.2*    124*     Recent Labs     08/10/21  0455 08/09/21  1050 08/08/21  0500     139 142 142   K 3.8  4.2 4.2 4.1   *  109* 112* 111*   CO2 27  26 27 26   BUN 19  20 20 21*   CREA 0.59*  0.54* 0.66* 0.59*   *  120* 115* 140*   CA 8.0*  8.2* 7.8* 7.4*   MG  --  2.4  --    PHOS 2.9 3.0 1.9*     Recent Labs     08/10/21  0455 08/09/21  1050 08/08/21  0500   * 433*  --     105  --    TBILI 0.5 0.6  --    TP 5.9* 5.8*  --    ALB 2.0*  1.9* 1.9*  1.9* 2.0*   GLOB 3.9 3.9  --      No results for input(s): INR, PTP, APTT, INREXT, INREXT in the last 72 hours. No results for input(s): FE, TIBC, PSAT, FERR in the last 72 hours. No results found for: FOL, RBCF   Recent Labs     08/10/21  0451 08/09/21  0515   PH 7.51* 7.55*   PCO2 34* 29*   PO2 79 107*     No results for input(s): CPK, CKNDX, TROIQ in the last 72 hours.     No lab exists for component: CPKMB  No results found for: CHOL, CHOLX, CHLST, CHOLV, HDL, HDLP, LDL, LDLC, DLDLP, TGLX, TRIGL, TRIGP, [facility], [facility]  Lab Results   Component Value Date/Time    Glucose (POC) 102 08/10/2021 06:37 AM    Glucose (POC) 117 08/09/2021 11:41 PM    Glucose (POC) 130 (H) 08/08/2021 08:41 PM    Glucose (POC) 147 (H) 08/08/2021 06:02 PM    Glucose (POC) 135 (H) 08/08/2021 11:45 AM     Lab Results   Component Value Date/Time    Color Yellow/Straw 08/04/2021 06:41 PM    Appearance Clear 08/04/2021 06:41 PM    Specific gravity 1.026 08/04/2021 06:41 PM    pH (UA) 6.0 08/04/2021 06:41 PM    Protein 30 (A) 08/04/2021 06:41 PM    Glucose 50 (A) 08/04/2021 06:41 PM    Ketone 5 (A) 08/04/2021 06:41 PM    Bilirubin Negative 08/04/2021 06:41 PM    Urobilinogen 4.0 (H) 08/04/2021 06:41 PM    Nitrites Negative 08/04/2021 06:41 PM    Leukocyte Esterase Negative 08/04/2021 06:41 PM    Bacteria Negative 08/04/2021 06:41 PM    WBC 0-4 08/04/2021 06:41 PM    RBC 0-5 08/04/2021 06:41 PM         Assessment:     Acute Brainstem and cerebellar infarct  Acute hypoxemic respiratory failure intubated 50%  Aspiration pneumonia  Sepsis  History of CVA  History of seizures  Acute kidney injury  Leukocytosis      Plan:   Patient in ICU on ventilator 50% O2  On propofol drip for sedation  On Keppra 500 IV every 12 hours for seizures  On IV Zosyn for aspiration pneumonia  On aspirin and Lipitor  On Decadron  Continue IV fluids     We continue NG tube feedings  Dietitian consult     Overall prognosis poor     Patient is still full code          Current Facility-Administered Medications:     heparin (porcine) injection 5,000 Units, 5,000 Units, SubCUTAneous, BID, Lupis Taylor MD, 5,000 Units at 08/10/21 5610    glycopyrrolate (ROBINUL) tablet 1 mg, 1 mg, Oral, BID, Lesly Franco MD, 1 mg at 08/10/21 7802    zinc oxide-white petrolatum 17-57 % topical paste, , Topical, TID, Lupis Taylor MD, Given at 08/10/21 0848    levETIRAcetam (KEPPRA) 500 mg in saline (iso-osm) 100 mL IVPB (premix), 500 mg, IntraVENous, Q12H, Lupis Taylor MD, 500 mg at 08/10/21 0849    metoclopramide HCl (REGLAN) injection 10 mg, 10 mg, IntraVENous, Q4H PRN, Lupis Taylor MD    0.9% sodium chloride infusion, 40 mL/hr, IntraVENous, CONTINUOUS, Lesly Franco MD, Last Rate: 40 mL/hr at 08/08/21 1054, 40 mL/hr at 08/08/21 1054    acetaminophen (TYLENOL) tablet 650 mg, 650 mg, Oral, Q6H PRN, 650 mg at 08/06/21 2110 **OR** acetaminophen (TYLENOL) suppository 650 mg, 650 mg, Rectal, Q6H PRN, Claudia, Brian Khalil MD    polyethylene glycol [facility]) packet 17 g, 17 g, Oral, DAILY PRN, Brian Taylor MD    ondansetron (ZOFRAN ODT) tablet 4 mg, 4 mg, Oral, Q8H PRN **OR** ondansetron (ZOFRAN) injection 4 mg, 4 mg, IntraVENous, Q6H PRN, Lupis Taylor MD    aspirin tablet 325 mg, 325 mg, Oral, DAILY, Lupis Taylor MD, 325 mg at 08/10/21 0849    atorvastatin (LIPITOR) tablet 40 mg, 40 mg, Oral, QHS, Lupis Taylor MD, 40 mg at 08/09/21 2239    piperacillin-tazobactam (ZOSYN) 3.375 g in 0.9% sodium chloride (MBP/ADV) 100 mL MBP, 3.375 g, IntraVENous, Q8H, Lupis Taylor MD, Last Rate: 200 mL/hr at 08/10/21 0300, 3.375 g at 08/10/21 0300    propofol (DIPRIVAN) 10 mg/mL infusion, 0-50 mcg/kg/min, IntraVENous, TITRATE, Lesli Duke MD, Last Rate: 7.9 mL/hr at 08/10/21 0852, 20 mcg/kg/min at 08/10/21 0852    dexamethasone (DECADRON) 4 mg/mL injection 4 mg, 4 mg, IntraVENous, Q6H, Lesli Duke MD, 4 mg at 08/10/21 1386    NOREPINephrine (LEVOPHED) 8 mg in 5% dextrose 250mL (32 mcg/mL) infusion, 10 mcg/min, IntraVENous, TITRATE, Brian Taylor MD, Stopped at 08/05/21 1111                                                                                              *ATTENTION:  This note has been created by a medical student for educational purposes only. Please do not refer to the content of this note for clinical decision-making, billing, or other purposes. Please see attending physicians note to obtain clinical information on this patient. *

## 2021-08-11 NOTE — PROGRESS NOTES
Consult  Pulmonary, Critical Care    Name: Michael Hanna MRN: [id number]   : 1955 Hospital: [facility]   Date: 2021  Admission date: 2021 Hospital Day: 8       Subjective/Interval History:   Patient admitted last night with altered mental status has remained unresponsive today. Some notes mention that he had vomiting at home before admission. His admission chest x-ray was relatively clear. He did go to MRI today came back to the floor Dr. Hair Da Silva saw him at that time he had agonal respirations and anesthesia was called to intubate him. Chest x-ray shows extensive new left-sided infiltrate he is currently on the ventilator has been transferred to the ICU. Dr. Hair Da Silva asked for me to see him for vent management     remains on the ventilator mildly sedated on propofol 10 mics. He has spontaneous respirations   remains on the ventilator reinstituted propofol due to cough and discoordination with the ventilator. He has self respirations   no change on the ventilator has self respirations remains on small dose propofol to avoid coughing against the ventilator   8/10 decrease vent support this morning respirations have increased and oxygen saturation dropped   yesterday with worsening hypoxia and when NG tube feeding stopped it all resolved. NG put to suction and overnight 800 cc out. Robinul was discontinued. We will start Reglan today and clamp the NG with drainage every 4 hours    Patient Active Problem List   Diagnosis Code    Altered mental status R41.82    Dysarthria R47.1    AMS (altered mental status) R41.82       IMPRESSION:   1. Acute hypoxic respiratory failure remains on mechanical ventilator FiO2 now at 50% with PEEP of 7  2. Aspiration pneumonia extensive left lung and right base   3. Hypotension questionably sepsis resolved  4. Extensive acute CVA  cerebellar and brainstem seen on MRI remains unresponsive  5.  Acute kidney injury resolved creatinine now normal  6. Elevated troponin probably acute myocardial infarct from anoxia  7. Hypophosphatemia  repleted   8. Hypocalcemia repleated  9. History seizure disorder  10. History intracranial aneurysm repair years ago  6. Chronic left hemiparesis  Body mass index is 29.17 kg/m². 12.       RECOMMENDATIONS/PLAN:   1. Maintain ventilator as is today  2. Continue IV Zosyn  3. NG to suction overnight with 800 cc out. We will start Reglan. Clamp the NG tube if no residual after 8 hours we will resume tube feeding  4. Continue IV fluids  5.   6.          Subjective/Initial History:       I was asked by Analy Reese MD to see Vipul Barry a 76 y.o.  male  in consultation for a chief complaint of acute hypoxic respiratory failure aspiration pneumonia      The patient is unable to give any meaningful history or review of systems due to patient factors. Patient PCP: None  PMH:  has a past medical history of CVA (cerebral vascular accident) ([facility] and Seizure ([facility] PSH:   has no past surgical history on file. FHX: family history is not on file. SHX:  reports that he has quit smoking. He has never used smokeless tobacco. He reports previous alcohol use. He reports that he does not use drugs.     Systemic review unobtainable as patient is unresponsive on the ventilator    No Known Allergies   MEDS:   Current Facility-Administered Medications   Medication    heparin (porcine) injection 5,000 Units    [Held by provider] glycopyrrolate (ROBINUL) tablet 1 mg    zinc oxide-white petrolatum 17-57 % topical paste    levETIRAcetam (KEPPRA) 500 mg in saline (iso-osm) 100 mL IVPB (premix)    metoclopramide HCl (REGLAN) injection 10 mg    0.9% sodium chloride infusion    acetaminophen (TYLENOL) tablet 650 mg    Or    acetaminophen (TYLENOL) suppository 650 mg    polyethylene glycol (MIRALAX) packet 17 g    ondansetron (ZOFRAN ODT) tablet 4 mg    Or    ondansetron (ZOFRAN) injection 4 mg    aspirin tablet 325 mg    atorvastatin (LIPITOR) tablet 40 mg    piperacillin-tazobactam (ZOSYN) 3.375 g in 0.9% sodium chloride (MBP/ADV) 100 mL MBP    propofol (DIPRIVAN) 10 mg/mL infusion    dexamethasone (DECADRON) 4 mg/mL injection 4 mg    NOREPINephrine (LEVOPHED) 8 mg in 5% dextrose 250mL (32 mcg/mL) infusion        Current Facility-Administered Medications:     heparin (porcine) injection 5,000 Units, 5,000 Units, SubCUTAneous, BID, Lupis Taylor MD, 5,000 Units at 08/10/21 2117    [Held by provider] glycopyrrolate (ROBINUL) tablet 1 mg, 1 mg, Oral, BID, Lauren Jj MD, 1 mg at 08/10/21 3517    zinc oxide-white petrolatum 17-57 % topical paste, , Topical, TID, Lupis Taylor MD, Given at 08/10/21 2118    levETIRAcetam (KEPPRA) 500 mg in saline (iso-osm) 100 mL IVPB (premix), 500 mg, IntraVENous, Q12H, Lupis Taylor MD, 500 mg at 08/11/21 0740    metoclopramide HCl (REGLAN) injection 10 mg, 10 mg, IntraVENous, Q4H PRN, Parth Taylor MD    0.9% sodium chloride infusion, 40 mL/hr, IntraVENous, CONTINUOUS, Lauren Jj MD, Last Rate: 40 mL/hr at 08/08/21 1054, 40 mL/hr at 08/08/21 1054    acetaminophen (TYLENOL) tablet 650 mg, 650 mg, Oral, Q6H PRN, 650 mg at 08/06/21 2110 **OR** acetaminophen (TYLENOL) suppository 650 mg, 650 mg, Rectal, Q6H PRN, Parth Taylor MD    polyethylene glycol (MIRALAX) packet 17 g, 17 g, Oral, DAILY PRN, Parth Taylor MD    ondansetron (ZOFRAN ODT) tablet 4 mg, 4 mg, Oral, Q8H PRN **OR** ondansetron (ZOFRAN) injection 4 mg, 4 mg, IntraVENous, Q6H PRN, Lupis Taylor MD    aspirin tablet 325 mg, 325 mg, Oral, DAILY, Lupis Taylor MD, 325 mg at 08/10/21 0849    atorvastatin (LIPITOR) tablet 40 mg, 40 mg, Oral, QHS, Lupis Taylor MD, 40 mg at 08/10/21 2118    piperacillin-tazobactam (ZOSYN) 3.375 g in 0.9% sodium chloride (MBP/ADV) 100 mL MBP, 3.375 g, IntraVENous, Q8H, Lupis Taylor MD, Last Rate: 200 mL/hr at 21 0452, 3.375 g at 21 0452    propofol (DIPRIVAN) 10 mg/mL infusion, 0-50 mcg/kg/min, IntraVENous, TITRATE, Itz Welsh MD, Last Rate: 5.9 mL/hr at 21 0035, 15 mcg/kg/min at 21 0035    dexamethasone (DECADRON) 4 mg/mL injection 4 mg, 4 mg, IntraVENous, Q6H, Itz Welsh MD, 4 mg at 21 0740    NOREPINephrine (LEVOPHED) 8 mg in 5% dextrose 250mL (32 mcg/mL) infusion, 10 mcg/min, IntraVENous, TITRATE, Lupis Taylor MD, Stopped at 21 1111      Objective:     Vital Signs: Telemetry:    normal sinus rhythm Intake/Output:   Visit Vitals  BP (!) 130/94 (BP 1 Location: Right upper arm, BP Patient Position: At rest)   Pulse (!) 101   Temp 99.3 °F (37.4 °C)   Resp 27   Ht 5' 5\" (1.651 m)   Wt 79.5 kg (175 lb 4.3 oz)   SpO2 99%   BMI 29.17 kg/m²       Temp (24hrs), Av.3 °F (36.8 °C), Min:96.6 °F (35.9 °C), Max:100.2 °F (37.9 °C)        O2 Device: Ventilator           Body mass index is 29.17 kg/m². Wt Readings from Last 4 Encounters:   08/10/21 79.5 kg (175 lb 4.3 oz)          Intake/Output Summary (Last 24 hours) at 2021 0819  Last data filed at 2021 0626  Gross per 24 hour   Intake 225 ml   Output 1525 ml   Net -1300 ml       Last shift:      No intake/output data recorded. Last 3 shifts:  190 -  0700  In: 600   Out:  [Urine:1250]       Hemodynamics:    CO:    CI:    CVP:    SVR:   PAP Systolic:    PAP Diastolic:    PVR:    VU10:       Ventilator Settings:      Mode Rate TV Press PEEP FiO2 PIP Min. Vent   SIMV, Volume control, Pressure support    550 ml 15 cm H2O  7 cm H20 50 %  23 cm H2O  13.2 l/min      Physical Exam:    General:  male; unresponsive on the ventilator   HEENT: NCAT, ET tube in place  Eyes: anicteric; conjunctiva clear no doll's eye reflex eyes are disconjugate  Neck: no nodes, no cuff leak, no accessory MM use.   No JVD  Chest: no deformity,   Cardiac: Regular rate and rhythm  Lungs: Has spontaneous respiration relatively clear right anterior lateral and posterior, better breath sounds over the left chest with posterior rales   Abd: Soft rare bowel sounds no grimacing to palpation  Ext: no edema; no joint swelling; No clubbing  : clear urine  Neuro:  On the ventilator on propofol unresponsive no doll's eye reflex flaccid extremities has extremely pronounced posturing when stimulated  Psych-  unable to assess  Skin: warm, dry, no cyanosis;   Pulses: Brachial and radial pulses intact  Capillary: Normal capillary refill      Labs:    Recent Labs     08/11/21  0440 08/10/21  0455 08/09/21  1050   WBC 22.6* 19.0* 17.9*   HGB 13.0 11.4* 11.6*    155 124*     Recent Labs     08/11/21  0440 08/10/21  0455 08/09/21  1050     145 142  139 142   K 3.9  3.9 3.8  4.2 4.2   *  111* 111*  109* 112*   CO2 29  30 27  26 27   *  108* 129*  120* 115*   BUN 21*  21* 19  20 20   CREA 0.68*  0.69* 0.59*  0.54* 0.66*   CA 8.3*  8.4* 8.0*  8.2* 7.8*   MG  --   --  2.4   PHOS 3.6 2.9 3.0   ALB 2.2*  2.2* 2.0*  1.9* 1.9*  1.9*   * 377* 433*           Lab Results   Component Value Date/Time    Culture result: No growth 4 days 08/05/2021 01:02 AM    Culture result: Light Normal respiratory jesus 08/04/2021 10:30 PM    Culture result: No Growth (<1000 cfu/mL) 08/04/2021 06:41 PM   Urine no growth  Sputum rare polys with normal jesus  Blood no growth  Nasal MRSA smear negative    Imaging:  I have personally reviewed the patients radiographs and have reviewed the reports:    CXR Results  (Last 48 hours)  8/11 ET and NG tube in place right base has cleared left extensive infiltrate may be slightly less dense  8/10 ET and NG tube remain in place extensive left lung infiltrate slightly less dense the left bases improved right medial base unchanged  8/7 chest x-ray ET and NG tube in place extensive left infiltrate with left base better inflation slowly clearing  8/6 chest x-ray ET NG tube in place extensive left infiltrate mildly improved  8/4 chest x-ray after intubation shows ET tube a little low left lung reinflated extensive infiltrate persist  8/4 radiology has called MRI or results as acute cerebellar and brainstem infarct               08/04/21 1710  XR CHEST PORT Final result    Impression:  New diffuse airspace opacity in the left lung. Narrative:  Chest, frontal view, 8/4/2021       History: Shortness of breath. Comparison: Chest, same day. Findings: The cardiac silhouette is stable. The right lung is adequately   expanded and clear. The left lung has diffuse airspace opacity, new as compared   to study performed earlier the same day. Differential considerations include   aspiration, collapse secondary to mucous plugging. Clinical correlation is   recommended. Left pleural effusion is not excluded. No pneumothorax is   identified. Degenerative changes are present in the thoracic spine. 08/04/21 0853  XR CHEST PORT Final result    Impression:  The cardiomediastinal silhouette is appropriate for age, technique,   and lung expansion. Pulmonary vasculature is not congested. The lungs are   essentially clear. No effusion or pneumothorax is seen. Narrative:  1 view               Results from Hospital Encounter encounter on 08/04/21    XR CHEST PORT    Narrative  Chest single view. Comparison single view chest August 10, 2021. Stable ET tube and NG tube. Persistent hazy opacity left lung. Cardiac and  mediastinal structures unchanged. No pneumothorax or sizable pleural effusion. XR CHEST PORT    Narrative  Chest single view. Comparison single view chest 8/9/2021. Stable ET tube and NG tube. Hazy opacity throughout the entire left lung and right lower lobe lung, similar  distribution compared to prior imaging. Cardiac and mediastinal structures  unchanged. No pneumothorax or sizable pleural effusion.       XR CHEST PORT    Narrative  Chest, 2 frontal views, 8/9/2021    History: Aspiration pneumonia. Comparison: Including chest 8/8/2021. Findings: The chest is obscured by artifact. Endotracheal tube tip is 4.3 cm  from the doretha. Feeding tube tip is at the stomach. The cardiac silhouette is  stable. Lung volumes are not significantly changed. Airspace opacities  throughout the left lung and right base are not significantly changed. No  pleural effusion or pneumothorax identified. The osseous structures are stable. Impression  No significant interval change. Results from [place] encounter on 08/04/21    CT CODE NEURO HEAD WO CONTRAST    Narrative  CT head without contrast    Dose reduction: All CT scans at this facility are performed using dose reduction  optimization techniques as appropriate to a performed exam including the  following: Automated exposure control, adjustments of the mA and/or kV according  to patient size, or use of iterative reconstruction technique. INDICATION: Code neuro    FINDINGS:    No acute intracranial bleed or midline shift. Areas of encephalomalacia in the  right hemisphere and left frontal lobe. Probable white matter small vessel  ischemic changes. Mild ex vacuo dilatation of right lateral ventricle. MRI is  more sensitive for evaluation of brain parenchyma. Atherosclerosis. No acute  skull fracture. Postoperative changes right skull. No fluid in the paranasal  sinuses or mastoid air cells. Caries and periapical infection/abscess involving  multiple teeth. Impression  No acute intracranial bleed. Bilateral areas of encephalomalacia. Atherosclerosis. A noncontrast head CT does not exclude the possibility of an  acute ischemic stroke. MRI is more sensitive for evaluation of brain parenchyma. Clinical correlation is recommended. Follow-up as clinically indicated.  The  results were called and verbally communicated to Dr. Karlos Wahl on 8/4/2021 at  1:00 AM.    Dental disease. Please see full report. Discussion aspiration pneumonia with acute respiratory failure secondary to vomiting and brainstem and cerebellar infarct. Can add Decadron to try and prevent edema but it is not likely to help. 8/5 remains unresponsive does have some generalized movement when sternal rub. Renal function has deteriorated questionable ATN. Chest x-ray shows extensive infiltrate from aspiration. Troponin has elevated. MRI results as mentioned with new acute cerebellar infarcts and probable brainstem as well recovery seems unlikely  8/6 remains unresponsive on the ventilator on small dose of propofol to avoid cough on ventilator. He does have spontaneous respirations. Sputum does show gram negative zeke a few gram-positive cocci nasal MRSA smear is negative we will maintain Zosyn  8/7 remains unresponsive on the ventilator. Propofol reintroduced at low rate as he was bucking the ventilator. No specific pathogen on sputum culture likely aspiration we will continue Zosyn phosphorus is low will replete  8/8 unchanged remains on the ventilator unresponsive continue propofol to avoid coughing against the ventilator. Phosphorus remains low. Creatinine normal will decrease IV fluids  8/9 unresponsive on the ventilator receiving low-dose propofol to prevent coughing against the ventilator has profound posturing when he is stimulated otherwise unresponsive. Chest x-ray still shows extensive infiltrate  8/10 no change unresponsive on the ventilator tachypnea. Chest x-ray slowly improving but extensive infiltrate remains  8/11 worsening hypoxia yesterday and ventilator support increased. Improved with discontinuation of tube feedings. He was on Robinul very likely had ileus with some reflux. Robinul discontinued.   We will add Reglan today and if insignificant residuals will restart tube feeding  Time of care 30 minutes           Thank you for allowing us to participate in the care of this patient.   We will follow along with you    Parminder Huerta MD

## 2021-08-11 NOTE — MED STUDENT NOTES
General Daily Progress Note          Patient Name:   Zelda Hare       YOB: 1955       Age:  76 y.o. Admit Date: 8/4/2021      Subjective:     Patient admitted five days ago with altered mental status has remained unresponsive.  Patient's son reports that the patient's baseline, he is walking and talking.  A follow-up MRI revealed acute infarction of cerebellar hemispheres, nemesio and midbrian.  Chest x-ray since admission shows extensive new left-sided infiltrate, he is currently on the ventilator. Per Pulmonology, the patient has decreased vent support this morning, respirations have increase and O2 saturation had dropped, but has returned to 98% saturation per recent vitals. He is in the CCU, with no changes in mentation today. He remains intubated ad on ventilator at 50% O2    Per pulmonology, patient was experiencing worsening hypoxia yesterday and ventilator support increased. Improved with discontinuation of tube feedings. He was on Robinul and very likely had ileus with some reflux. Robinul at this time was discontinued. Reglan was added and if insignificant residuals then will restart tube feeding. A lengthy discussion was had with the patient's family yesterday, and the family stated the wishes to make the patient DNR and move to comfort care.     Objective:     Visit Vitals  BP (!) 130/94 (BP 1 Location: Right upper arm, BP Patient Position: At rest)   Pulse (!) 107   Temp 97.6 °F (36.4 °C)   Resp 26   Ht 5' 5\" (1.651 m)   Wt 175 lb 4.3 oz (79.5 kg)   SpO2 97%   BMI 29.17 kg/m²        Recent Results (from the past 24 hour(s))   GLUCOSE, POC    Collection Time: 08/10/21  9:43 PM   Result Value Ref Range    Glucose (POC) 109 65 - 117 mg/dL    Performed by [facility]    RENAL FUNCTION PANEL    Collection Time: 08/11/21  4:40 AM   Result Value Ref Range    Sodium 144 136 - 145 mmol/L    Potassium 3.9 3.5 - 5.1 mmol/L    Chloride 111 (H) 97 - 108 mmol/L    CO2 29 21 - 32 mmol/L    Anion gap 4 (L) 5 - 15 mmol/L    Glucose 108 (H) 65 - 100 mg/dL    BUN 21 (H) 6 - 20 mg/dL    Creatinine 0.68 (L) 0.70 - 1.30 mg/dL    BUN/Creatinine ratio 31 (H) 12 - 20      GFR est AA >60 >60 ml/min/1.73m2    GFR est non-AA >60 >60 ml/min/1.73m2    Calcium 8.3 (L) 8.5 - 10.1 mg/dL    Phosphorus 3.6 2.6 - 4.7 mg/dL    Albumin 2.2 (L) 3.5 - 5.0 g/dL   CBC WITH AUTOMATED DIFF    Collection Time: 08/11/21  4:40 AM   Result Value Ref Range    WBC 22.6 (H) 4.1 - 11.1 K/uL    RBC 4.31 4.10 - 5.70 M/uL    HGB 13.0 12.1 - 17.0 g/dL    HCT 39.0 36.6 - 50.3 %    MCV 90.5 80.0 - 99.0 FL    MCH 30.2 26.0 - 34.0 PG    MCHC 33.3 30.0 - 36.5 g/dL    RDW 13.1 11.5 - 14.5 %    PLATELET 588 660 - 965 K/uL    MPV 12.1 8.9 - 12.9 FL    NRBC 0.1 (H) 0.0  WBC    ABSOLUTE NRBC 0.02 (H) 0.00 - 0.01 K/uL    NEUTROPHILS 93 (H) 32 - 75 %    LYMPHOCYTES 3 (L) 12 - 49 %    MONOCYTES 2 (L) 5 - 13 %    EOSINOPHILS 0 0 - 7 %    BASOPHILS 0 0 - 1 %    IMMATURE GRANULOCYTES 2 (H) 0 - 0.5 %    ABS. NEUTROPHILS 20.8 (H) 1.8 - 8.0 K/UL    ABS. LYMPHOCYTES 0.8 0.8 - 3.5 K/UL    ABS. MONOCYTES 0.6 0.0 - 1.0 K/UL    ABS. EOSINOPHILS 0.0 0.0 - 0.4 K/UL    ABS. BASOPHILS 0.1 0.0 - 0.1 K/UL    ABS. IMM. GRANS. 0.4 (H) 0.00 - 0.04 K/UL    DF AUTOMATED     METABOLIC PANEL, COMPREHENSIVE    Collection Time: 08/11/21  4:40 AM   Result Value Ref Range    Sodium 145 136 - 145 mmol/L    Potassium 3.9 3.5 - 5.1 mmol/L    Chloride 111 (H) 97 - 108 mmol/L    CO2 30 21 - 32 mmol/L    Anion gap 4 (L) 5 - 15 mmol/L    Glucose 108 (H) 65 - 100 mg/dL    BUN 21 (H) 6 - 20 mg/dL    Creatinine 0.69 (L) 0.70 - 1.30 mg/dL    BUN/Creatinine ratio 30 (H) 12 - 20      GFR est AA >60 >60 ml/min/1.73m2    GFR est non-AA >60 >60 ml/min/1.73m2    Calcium 8.4 (L) 8.5 - 10.1 mg/dL    Bilirubin, total 0.6 0.2 - 1.0 mg/dL    AST (SGOT) 147 (H) 15 - 37 U/L    ALT (SGPT) 338 (H) 12 - 78 U/L    Alk.  phosphatase 94 45 - 117 U/L    Protein, total 6.4 6.4 - 8.2 g/dL Albumin 2.2 (L) 3.5 - 5.0 g/dL    Globulin 4.2 (H) 2.0 - 4.0 g/dL    A-G Ratio 0.5 (L) 1.1 - 2.2       [unfilled]      Review of Systems  Limited secondary to unresponsive nature      Physical Exam:      Constitutional: pt is nonresponsive. HENT:   Head: Normocephalic and atraumatic. Eyes: Pupils are equal, round, and reactive to light. EOM are normal.   Cardiovascular: Normal rate, regular rhythm and normal heart sounds. Pulmonary/Chest: Breath sounds normal. No wheezes. No rales. Exhibits no tenderness. Patient is intubated. Abdominal: Soft. Bowel sounds are normal. There is no abdominal tenderness. There is no rebound and no guarding. Musculoskeletal: Normal range of motion. Neurological: pt is nonresponsive    XR CHEST PORT   Final Result      XR CHEST PORT   Final Result      XR CHEST PORT   Final Result   No significant interval change. XR CHEST PORT   Final Result   No significant change. XR CHEST PORT   Final Result   FINDINGS/IMPRESSION:   Support lines and tubes are appropriate in radiographic position. Persistent airspace disease throughout the left lung, likely pneumonia. Cardiac   silhouette stable in size. Hemodynamic status stable. XR CHEST PORT   Final Result      DUPLEX CAROTID BILATERAL   Final Result      XR CHEST PORT   Final Result      XR CHEST PORT   Final Result   ET tube placed, with tip 7 mm above the doretha. Asymmetric airspace   disease noted throughout the left lung, consistent with pneumonia, improved from   previous. Right lung clear. Stable normal heart size. Calcified aortic knob. XR CHEST PORT   Final Result   New diffuse airspace opacity in the left lung. MRI BRAIN WO CONT   Final Result   1.   Restricted diffusion involving the cerebellar hemispheres, nemesio, and   possibly the left midbrain most likely representing acute ischemic infarcts   although the brainstem involvement may be partially artifactual. Consider a   short-term interval follow-up MRI. 2.  Attenuated vertebrobasilar flow voids which may be related to underlying   stenosis. CTA or MRA is recommended for further evaluation. 3.  Extensive multifocal encephalomalacia as described above. Report called to the patient's ICU nurse, Milton Cabezas at 5:45 PM on   8/4/2021 via phone conversation. XR CHEST PORT   Final Result   The cardiomediastinal silhouette is appropriate for age, technique,   and lung expansion. Pulmonary vasculature is not congested. The lungs are   essentially clear. No effusion or pneumothorax is seen. CT CODE NEURO HEAD WO CONTRAST   Final Result      No acute intracranial bleed. Bilateral areas of encephalomalacia. Atherosclerosis. A noncontrast head CT does not exclude the possibility of an   acute ischemic stroke. MRI is more sensitive for evaluation of brain parenchyma. Clinical correlation is recommended. Follow-up as clinically indicated. The   results were called and verbally communicated to Dr. Holly Garcia on 8/4/2021 at   1:00 AM.      Dental disease. Please see full report.       XR CHEST PORT    (Results Pending)        Recent Results (from the past 24 hour(s))   GLUCOSE, POC    Collection Time: 08/10/21  9:43 PM   Result Value Ref Range    Glucose (POC) 109 65 - 117 mg/dL    Performed by Tim Pérez    RENAL FUNCTION PANEL    Collection Time: 08/11/21  4:40 AM   Result Value Ref Range    Sodium 144 136 - 145 mmol/L    Potassium 3.9 3.5 - 5.1 mmol/L    Chloride 111 (H) 97 - 108 mmol/L    CO2 29 21 - 32 mmol/L    Anion gap 4 (L) 5 - 15 mmol/L    Glucose 108 (H) 65 - 100 mg/dL    BUN 21 (H) 6 - 20 mg/dL    Creatinine 0.68 (L) 0.70 - 1.30 mg/dL    BUN/Creatinine ratio 31 (H) 12 - 20      GFR est AA >60 >60 ml/min/1.73m2    GFR est non-AA >60 >60 ml/min/1.73m2    Calcium 8.3 (L) 8.5 - 10.1 mg/dL    Phosphorus 3.6 2.6 - 4.7 mg/dL    Albumin 2.2 (L) 3.5 - 5.0 g/dL   CBC WITH AUTOMATED DIFF    Collection Time: 08/11/21 4:40 AM   Result Value Ref Range    WBC 22.6 (H) 4.1 - 11.1 K/uL    RBC 4.31 4.10 - 5.70 M/uL    HGB 13.0 12.1 - 17.0 g/dL    HCT 39.0 36.6 - 50.3 %    MCV 90.5 80.0 - 99.0 FL    MCH 30.2 26.0 - 34.0 PG    MCHC 33.3 30.0 - 36.5 g/dL    RDW 13.1 11.5 - 14.5 %    PLATELET 335 144 - 648 K/uL    MPV 12.1 8.9 - 12.9 FL    NRBC 0.1 (H) 0.0  WBC    ABSOLUTE NRBC 0.02 (H) 0.00 - 0.01 K/uL    NEUTROPHILS 93 (H) 32 - 75 %    LYMPHOCYTES 3 (L) 12 - 49 %    MONOCYTES 2 (L) 5 - 13 %    EOSINOPHILS 0 0 - 7 %    BASOPHILS 0 0 - 1 %    IMMATURE GRANULOCYTES 2 (H) 0 - 0.5 %    ABS. NEUTROPHILS 20.8 (H) 1.8 - 8.0 K/UL    ABS. LYMPHOCYTES 0.8 0.8 - 3.5 K/UL    ABS. MONOCYTES 0.6 0.0 - 1.0 K/UL    ABS. EOSINOPHILS 0.0 0.0 - 0.4 K/UL    ABS. BASOPHILS 0.1 0.0 - 0.1 K/UL    ABS. IMM. GRANS. 0.4 (H) 0.00 - 0.04 K/UL    DF AUTOMATED     METABOLIC PANEL, COMPREHENSIVE    Collection Time: 08/11/21  4:40 AM   Result Value Ref Range    Sodium 145 136 - 145 mmol/L    Potassium 3.9 3.5 - 5.1 mmol/L    Chloride 111 (H) 97 - 108 mmol/L    CO2 30 21 - 32 mmol/L    Anion gap 4 (L) 5 - 15 mmol/L    Glucose 108 (H) 65 - 100 mg/dL    BUN 21 (H) 6 - 20 mg/dL    Creatinine 0.69 (L) 0.70 - 1.30 mg/dL    BUN/Creatinine ratio 30 (H) 12 - 20      GFR est AA >60 >60 ml/min/1.73m2    GFR est non-AA >60 >60 ml/min/1.73m2    Calcium 8.4 (L) 8.5 - 10.1 mg/dL    Bilirubin, total 0.6 0.2 - 1.0 mg/dL    AST (SGOT) 147 (H) 15 - 37 U/L    ALT (SGPT) 338 (H) 12 - 78 U/L    Alk.  phosphatase 94 45 - 117 U/L    Protein, total 6.4 6.4 - 8.2 g/dL    Albumin 2.2 (L) 3.5 - 5.0 g/dL    Globulin 4.2 (H) 2.0 - 4.0 g/dL    A-G Ratio 0.5 (L) 1.1 - 2.2         Results     Procedure Component Value Units Date/Time    CULTURE, BLOOD, LINE [[id number]] Collected: 08/05/21 0102    Order Status: Completed Specimen: Blood Updated: 08/11/21 0912     Special Requests: No Special Requests        Culture result: No growth 5 days       CULTURE, RESPIRATORY/SPUTUM/BRONCH W GRAM STAIN [[id number]] Collected: 08/04/21 2230    Order Status: Completed Specimen: Sputum Updated: 08/07/21 1134     Special Requests: No Special Requests        GRAM STAIN Rare WBCs seen         Rare EPITHELIAL CELLS               Occasional Gram Positive Cocci in clusters                  Occasional Gram Negative Rods           Culture result:       Light Normal respiratory jesus          CULTURE, URINE [[id number]] Collected: 08/04/21 1841    Order Status: Completed Specimen: Urine Updated: 08/06/21 0729     Special Requests: No Special Requests        Culture result: No Growth (<1000 cfu/mL)       MRSA SCREEN - PCR (NASAL) [[id number]] Collected: 08/04/21 1841    Order Status: Completed Specimen: Swab Updated: 08/05/21 0027     MRSA by PCR, Nasal Not Detected       CULTURE, URINE [[id number]] Collected: 08/04/21 1100    Order Status: Completed Specimen: Urine Updated: 08/05/21 1416     Special Requests: No Special Requests        Culture result:       No significant growth, <10,000 CFU/mL          CULTURE, BLOOD #2 [[id number]] Collected: 08/04/21 1025    Order Status: Completed Specimen: Blood Updated: 08/10/21 0839     Special Requests: No Special Requests        Culture result: No growth 6 days       CULTURE, BLOOD #1 [[id number]] Collected: 08/04/21 1020    Order Status: Completed Specimen: Blood Updated: 08/10/21 0839     Special Requests: No Special Requests        Culture result: No growth 6 days              Labs:     Recent Labs     08/11/21  0440 08/10/21  0455   WBC 22.6* 19.0*   HGB 13.0 11.4*   HCT 39.0 34.2*    155     Recent Labs     08/11/21  0440 08/10/21  0455 08/09/21  1050     145 142  139 142   K 3.9  3.9 3.8  4.2 4.2   *  111* 111*  109* 112*   CO2 29  30 27  26 27   BUN 21*  21* 19  20 20   CREA 0.68*  0.69* 0.59*  0.54* 0.66*   *  108* 129*  120* 115*   CA 8.3*  8.4* 8.0*  8.2* 7.8*   MG  --   --  2.4   PHOS 3.6 2.9 3.0     Recent Labs     08/11/21  0440 08/10/21  0455 08/09/21  1050   * 377* 433*   AP 94 102 105   TBILI 0.6 0.5 0.6   TP 6.4 5.9* 5.8*   ALB 2.2*  2.2* 2.0*  1.9* 1.9*  1.9*   GLOB 4.2* 3.9 3.9     No results for input(s): INR, PTP, APTT, INREXT in the last 72 hours. No results for input(s): FE, TIBC, PSAT, FERR in the last 72 hours. No results found for: FOL, RBCF   Recent Labs     08/10/21  0451 08/09/21  0515   PH 7.51* 7.55*   PCO2 34* 29*   PO2 79 107*     No results for input(s): CPK, CKNDX, TROIQ in the last 72 hours.     No lab exists for component: CPKMB  No results found for: CHOL, CHOLX, CHLST, CHOLV, HDL, HDLP, LDL, LDLC, DLDLP, TGLX, TRIGL, TRIGP, CHHD, CHHDX  Lab Results   Component Value Date/Time    Glucose (POC) 109 08/10/2021 09:43 PM    Glucose (POC) 102 08/10/2021 06:37 AM    Glucose (POC) 117 08/09/2021 11:41 PM    Glucose (POC) 130 (H) 08/08/2021 08:41 PM    Glucose (POC) 147 (H) 08/08/2021 06:02 PM     Lab Results   Component Value Date/Time    Color Yellow/Straw 08/04/2021 06:41 PM    Appearance Clear 08/04/2021 06:41 PM    Specific gravity 1.026 08/04/2021 06:41 PM    pH (UA) 6.0 08/04/2021 06:41 PM    Protein 30 (A) 08/04/2021 06:41 PM    Glucose 50 (A) 08/04/2021 06:41 PM    Ketone 5 (A) 08/04/2021 06:41 PM    Bilirubin Negative 08/04/2021 06:41 PM    Urobilinogen 4.0 (H) 08/04/2021 06:41 PM    Nitrites Negative 08/04/2021 06:41 PM    Leukocyte Esterase Negative 08/04/2021 06:41 PM    Bacteria Negative 08/04/2021 06:41 PM    WBC 0-4 08/04/2021 06:41 PM    RBC 0-5 08/04/2021 06:41 PM         Assessment:   Acute Brainstem and cerebellar infarct  Acute hypoxemic respiratory failure intubated 50%  Aspiration pneumonia  Sepsis  History of CVA  History of seizures  Acute kidney injury  Leukocytosis      Plan:      Patient in ICU on ventilator 50% O2  Plan is upon arrival of patient's family around 10-11AM today, is to withdrawal care  On propofol drip for sedation  On Keppra 500 IV every 12 hours for seizures  On IV Zosyn for aspiration pneumonia  On aspirin and Lipitor  On Decadron  Continue IV fluids     We continue NG tube feedings  Dietitian consult     Overall prognosis poor     Patient is still full code          Current Facility-Administered Medications:     dexamethasone (DECADRON) 4 mg/mL injection 2 mg, 2 mg, IntraVENous, Q12H, Julianne Denny MD    metoclopramide HCl (REGLAN) injection 5 mg, 5 mg, IntraVENous, Q6H, Julianne Denny MD    heparin (porcine) injection 5,000 Units, 5,000 Units, SubCUTAneous, BID, Lupis Taylor MD, 5,000 Units at 08/10/21 2117    zinc oxide-white petrolatum 17-57 % topical paste, , Topical, TID, Lupis Taylor MD, Given at 08/10/21 2118    levETIRAcetam (KEPPRA) 500 mg in saline (iso-osm) 100 mL IVPB (premix), 500 mg, IntraVENous, Q12H, Lupis Taylor MD, 500 mg at 08/11/21 0740    metoclopramide HCl (REGLAN) injection 10 mg, 10 mg, IntraVENous, Q4H PRN, Juanis Taylor MD    0.9% sodium chloride infusion, 40 mL/hr, IntraVENous, CONTINUOUS, Julianne Denny MD, Last Rate: 40 mL/hr at 08/08/21 1054, 40 mL/hr at 08/08/21 1054    acetaminophen (TYLENOL) tablet 650 mg, 650 mg, Oral, Q6H PRN, 650 mg at 08/06/21 2110 **OR** acetaminophen (TYLENOL) suppository 650 mg, 650 mg, Rectal, Q6H PRN, Juanis Taylor MD    polyethylene glycol (MIRALAX) packet 17 g, 17 g, Oral, DAILY PRN, Juanis Taylor MD    ondansetron (ZOFRAN ODT) tablet 4 mg, 4 mg, Oral, Q8H PRN **OR** ondansetron (ZOFRAN) injection 4 mg, 4 mg, IntraVENous, Q6H PRN, Lupis Taylor MD    aspirin tablet 325 mg, 325 mg, Oral, DAILY, Lupis Taylor MD, 325 mg at 08/10/21 0849    atorvastatin (LIPITOR) tablet 40 mg, 40 mg, Oral, QHS, Lupis Taylor MD, 40 mg at 08/10/21 2118    piperacillin-tazobactam (ZOSYN) 3.375 g in 0.9% sodium chloride (MBP/ADV) 100 mL MBP, 3.375 g, IntraVENous, Q8H, Lupis Taylor MD, Last Rate: 200 mL/hr at 08/11/21 0452, 3.375 g at 08/11/21 0452    propofol (DIPRIVAN) 10 mg/mL infusion, 0-50 mcg/kg/min, IntraVENous, TITRATE, Greg Connor MD, Last Rate: 5.9 mL/hr at 08/11/21 0035, 15 mcg/kg/min at 08/11/21 0035    NOREPINephrine (LEVOPHED) 8 mg in 5% dextrose 250mL (32 mcg/mL) infusion, 10 mcg/min, IntraVENous, TITRATE, Mojgan Taylor MD, Stopped at 08/05/21 1111                                                                                              *ATTENTION:  This note has been created by a medical student for educational purposes only. Please do not refer to the content of this note for clinical decision-making, billing, or other purposes. Please see attending physicians note to obtain clinical information on this patient. *

## 2021-08-11 NOTE — PROGRESS NOTES
Bedside shift change report given to Shannon HUBBARD by Brent Hidalgo . Report included the following information Kardex, Intake/Output and Recent Results.

## 2021-08-11 NOTE — PROGRESS NOTES
The purpose of the visit was in response to the patient being moved to comfort measures. The patient's family was at the bedside during the time of the visit. They were tearfully grieving their father's anticipatory death. They shared their appreciation for the visit. The  provided the ministry of presence and the comfort of care. [facility] Felisa James.    can be reached by calling the  at [facility]  [phone]

## 2021-08-11 NOTE — PROGRESS NOTES
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  Respiratory failure  Mental status changes  Left spastic hemiplegia  Other medical problems      EXAM:  Nonresponsive to verbal stimulus  Continue decerebration  Left spastic hemiplegia unchanged      ASSESSMENT/PLAN:  There has been no neurological improvement  Prognosis unchanged    ALLERGIES:    No Known Allergies    MEDS:      Current Facility-Administered Medications:     dexamethasone (DECADRON) 4 mg/mL injection 2 mg, 2 mg, IntraVENous, Q12H, Angelica Jonas MD    metoclopramide HCl (REGLAN) injection 5 mg, 5 mg, IntraVENous, Q6H, Angelica Jonas MD    heparin (porcine) injection 5,000 Units, 5,000 Units, SubCUTAneous, BID, Lupis Taylor MD, 5,000 Units at 08/10/21 2117    zinc oxide-white petrolatum 17-57 % topical paste, , Topical, TID, Lupis Taylor MD, Given at 08/10/21 2118    levETIRAcetam (KEPPRA) 500 mg in saline (iso-osm) 100 mL IVPB (premix), 500 mg, IntraVENous, Q12H, Lupis Taylor MD, 500 mg at 08/11/21 0740    metoclopramide HCl (REGLAN) injection 10 mg, 10 mg, IntraVENous, Q4H PRN, Ebony Taylor MD    0.9% sodium chloride infusion, 40 mL/hr, IntraVENous, CONTINUOUS, Angelica Jonas MD, Last Rate: 40 mL/hr at 08/08/21 1054, 40 mL/hr at 08/08/21 1054    acetaminophen (TYLENOL) tablet 650 mg, 650 mg, Oral, Q6H PRN, 650 mg at 08/06/21 2110 **OR** acetaminophen (TYLENOL) suppository 650 mg, 650 mg, Rectal, Q6H PRN, Ebony Taylor MD    polyethylene glycol (MIRALAX) packet 17 g, 17 g, Oral, DAILY PRN, Ebony Taylor MD    ondansetron (ZOFRAN ODT) tablet 4 mg, 4 mg, Oral, Q8H PRN **OR** ondansetron (ZOFRAN) injection 4 mg, 4 mg, IntraVENous, Q6H PRN, Lupis Taylor MD    aspirin tablet 325 mg, 325 mg, Oral, DAILY, Lupis Taylor MD, 325 mg at 08/10/21 0849    atorvastatin (LIPITOR) tablet 40 mg, 40 mg, Oral, QHS, Lupis Taylor MD, 40 mg at 08/10/21 2118   piperacillin-tazobactam (ZOSYN) 3.375 g in 0.9% sodium chloride (MBP/ADV) 100 mL MBP, 3.375 g, IntraVENous, Q8H, Lupis Taylor MD, Last Rate: 200 mL/hr at 08/11/21 0452, 3.375 g at 08/11/21 0452    propofol (DIPRIVAN) 10 mg/mL infusion, 0-50 mcg/kg/min, IntraVENous, TITRATE, Leidy Cruz MD, Last Rate: 5.9 mL/hr at 08/11/21 0035, 15 mcg/kg/min at 08/11/21 0035    NOREPINephrine (LEVOPHED) 8 mg in 5% dextrose 250mL (32 mcg/mL) infusion, 10 mcg/min, IntraVENous, TITRATE, Lupis Taylor MD, Stopped at 08/05/21 1111    LABS:  Recent Results (from the past 24 hour(s))   GLUCOSE, POC    Collection Time: 08/10/21  9:43 PM   Result Value Ref Range    Glucose (POC) 109 65 - 117 mg/dL    Performed by Tim Pérez    RENAL FUNCTION PANEL    Collection Time: 08/11/21  4:40 AM   Result Value Ref Range    Sodium 144 136 - 145 mmol/L    Potassium 3.9 3.5 - 5.1 mmol/L    Chloride 111 (H) 97 - 108 mmol/L    CO2 29 21 - 32 mmol/L    Anion gap 4 (L) 5 - 15 mmol/L    Glucose 108 (H) 65 - 100 mg/dL    BUN 21 (H) 6 - 20 mg/dL    Creatinine 0.68 (L) 0.70 - 1.30 mg/dL    BUN/Creatinine ratio 31 (H) 12 - 20      GFR est AA >60 >60 ml/min/1.73m2    GFR est non-AA >60 >60 ml/min/1.73m2    Calcium 8.3 (L) 8.5 - 10.1 mg/dL    Phosphorus 3.6 2.6 - 4.7 mg/dL    Albumin 2.2 (L) 3.5 - 5.0 g/dL   CBC WITH AUTOMATED DIFF    Collection Time: 08/11/21  4:40 AM   Result Value Ref Range    WBC 22.6 (H) 4.1 - 11.1 K/uL    RBC 4.31 4.10 - 5.70 M/uL    HGB 13.0 12.1 - 17.0 g/dL    HCT 39.0 36.6 - 50.3 %    MCV 90.5 80.0 - 99.0 FL    MCH 30.2 26.0 - 34.0 PG    MCHC 33.3 30.0 - 36.5 g/dL    RDW 13.1 11.5 - 14.5 %    PLATELET 935 946 - 361 K/uL    MPV 12.1 8.9 - 12.9 FL    NRBC 0.1 (H) 0.0  WBC    ABSOLUTE NRBC 0.02 (H) 0.00 - 0.01 K/uL    NEUTROPHILS 93 (H) 32 - 75 %    LYMPHOCYTES 3 (L) 12 - 49 %    MONOCYTES 2 (L) 5 - 13 %    EOSINOPHILS 0 0 - 7 %    BASOPHILS 0 0 - 1 %    IMMATURE GRANULOCYTES 2 (H) 0 - 0.5 %    ABS.  NEUTROPHILS 20.8 (H) 1.8 - 8.0 K/UL    ABS. LYMPHOCYTES 0.8 0.8 - 3.5 K/UL    ABS. MONOCYTES 0.6 0.0 - 1.0 K/UL    ABS. EOSINOPHILS 0.0 0.0 - 0.4 K/UL    ABS. BASOPHILS 0.1 0.0 - 0.1 K/UL    ABS. IMM. GRANS. 0.4 (H) 0.00 - 0.04 K/UL    DF AUTOMATED     METABOLIC PANEL, COMPREHENSIVE    Collection Time: 08/11/21  4:40 AM   Result Value Ref Range    Sodium 145 136 - 145 mmol/L    Potassium 3.9 3.5 - 5.1 mmol/L    Chloride 111 (H) 97 - 108 mmol/L    CO2 30 21 - 32 mmol/L    Anion gap 4 (L) 5 - 15 mmol/L    Glucose 108 (H) 65 - 100 mg/dL    BUN 21 (H) 6 - 20 mg/dL    Creatinine 0.69 (L) 0.70 - 1.30 mg/dL    BUN/Creatinine ratio 30 (H) 12 - 20      GFR est AA >60 >60 ml/min/1.73m2    GFR est non-AA >60 >60 ml/min/1.73m2    Calcium 8.4 (L) 8.5 - 10.1 mg/dL    Bilirubin, total 0.6 0.2 - 1.0 mg/dL    AST (SGOT) 147 (H) 15 - 37 U/L    ALT (SGPT) 338 (H) 12 - 78 U/L    Alk. phosphatase 94 45 - 117 U/L    Protein, total 6.4 6.4 - 8.2 g/dL    Albumin 2.2 (L) 3.5 - 5.0 g/dL    Globulin 4.2 (H) 2.0 - 4.0 g/dL    A-G Ratio 0.5 (L) 1.1 - 2.2         Visit Vitals  BP (!) 130/94 (BP 1 Location: Right upper arm, BP Patient Position: At rest)   Pulse (!) 107   Temp 97.6 °F (36.4 °C)   Resp 26   Ht 5' 5\" (1.651 m)   Wt 79.5 kg (175 lb 4.3 oz)   SpO2 97%   BMI 29.17 kg/m²       Imaging:  XR CHEST PORT   Final Result      XR CHEST PORT   Final Result      XR CHEST PORT   Final Result   No significant interval change. XR CHEST PORT   Final Result   No significant change. XR CHEST PORT   Final Result   FINDINGS/IMPRESSION:   Support lines and tubes are appropriate in radiographic position. Persistent airspace disease throughout the left lung, likely pneumonia. Cardiac   silhouette stable in size. Hemodynamic status stable. XR CHEST PORT   Final Result      DUPLEX CAROTID BILATERAL   Final Result      XR CHEST PORT   Final Result      XR CHEST PORT   Final Result   ET tube placed, with tip 7 mm above the doretha.  Asymmetric airspace disease noted throughout the left lung, consistent with pneumonia, improved from   previous. Right lung clear. Stable normal heart size. Calcified aortic knob. XR CHEST PORT   Final Result   New diffuse airspace opacity in the left lung. MRI BRAIN WO CONT   Final Result   1. Restricted diffusion involving the cerebellar hemispheres, nemesio, and   possibly the left midbrain most likely representing acute ischemic infarcts   although the brainstem involvement may be partially artifactual. Consider a   short-term interval follow-up MRI. 2.  Attenuated vertebrobasilar flow voids which may be related to underlying   stenosis. CTA or MRA is recommended for further evaluation. 3.  Extensive multifocal encephalomalacia as described above. Report called to the patient's ICU nurse, Chales Robe Dr. Green Sandifer at 5:45 PM on   8/4/2021 via phone conversation. XR CHEST PORT   Final Result   The cardiomediastinal silhouette is appropriate for age, technique,   and lung expansion. Pulmonary vasculature is not congested. The lungs are   essentially clear. No effusion or pneumothorax is seen. CT CODE NEURO HEAD WO CONTRAST   Final Result      No acute intracranial bleed. Bilateral areas of encephalomalacia. Atherosclerosis. A noncontrast head CT does not exclude the possibility of an   acute ischemic stroke. MRI is more sensitive for evaluation of brain parenchyma. Clinical correlation is recommended. Follow-up as clinically indicated. The   results were called and verbally communicated to Dr. Polina Turk on 8/4/2021 at   1:00 AM.      Dental disease. Please see full report.       XR CHEST PORT    (Results Pending)

## 2021-08-11 NOTE — MED STUDENT NOTES
General Daily Progress Note          Patient Name:   Sp Cowan       YOB: 1955       Age:  76 y.o. Admit Date: 8/4/2021      Subjective:     Patient admitted five days ago with altered mental status has remained unresponsive.  Patient's son reports that the patient's baseline, he is walking and talking.  A follow-up MRI revealed acute infarction of cerebellar hemispheres, nemesio and midbrian.  Chest x-ray since admission shows extensive new left-sided infiltrate, he is currently on the ventilator. Per Pulmonology, the patient has decreased vent support this morning, respirations have increase and O2 saturation had dropped, but has returned to 98% saturation per recent vitals. He is in the CCU, with no changes in mentation today. He remains intubated ad on ventilator at 50% O2    Per pulmonology, patient was experiencing worsening hypoxia yesterday and ventilator support increased. Improved with discontinuation of tube feedings. He was on Robinul and very likely had ileus with some reflux. Robinul at this time was discontinued. Reglan was added and if insignificant residuals then will restart tube feeding. A lengthy discussion was had with the patient's family yesterday, and the family stated the wishes to make the patient DNR and move to comfort care.     Objective:     Visit Vitals  BP 98/78   Pulse (!) 105   Temp 100.4 °F (38 °C)   Resp 17   Ht 5' 5\" (1.651 m)   Wt 79.5 kg (175 lb 4.3 oz)   SpO2 98%   BMI 29.17 kg/m²        Recent Results (from the past 24 hour(s))   GLUCOSE, POC    Collection Time: 08/10/21  9:43 PM   Result Value Ref Range    Glucose (POC) 109 65 - 117 mg/dL    Performed by [facility]    RENAL FUNCTION PANEL    Collection Time: 08/11/21  4:40 AM   Result Value Ref Range    Sodium 144 136 - 145 mmol/L    Potassium 3.9 3.5 - 5.1 mmol/L    Chloride 111 (H) 97 - 108 mmol/L    CO2 29 21 - 32 mmol/L    Anion gap 4 (L) 5 - 15 mmol/L    Glucose 108 (H) 65 - 100 mg/dL BUN 21 (H) 6 - 20 mg/dL    Creatinine 0.68 (L) 0.70 - 1.30 mg/dL    BUN/Creatinine ratio 31 (H) 12 - 20      GFR est AA >60 >60 ml/min/1.73m2    GFR est non-AA >60 >60 ml/min/1.73m2    Calcium 8.3 (L) 8.5 - 10.1 mg/dL    Phosphorus 3.6 2.6 - 4.7 mg/dL    Albumin 2.2 (L) 3.5 - 5.0 g/dL   CBC WITH AUTOMATED DIFF    Collection Time: 08/11/21  4:40 AM   Result Value Ref Range    WBC 22.6 (H) 4.1 - 11.1 K/uL    RBC 4.31 4.10 - 5.70 M/uL    HGB 13.0 12.1 - 17.0 g/dL    HCT 39.0 36.6 - 50.3 %    MCV 90.5 80.0 - 99.0 FL    MCH 30.2 26.0 - 34.0 PG    MCHC 33.3 30.0 - 36.5 g/dL    RDW 13.1 11.5 - 14.5 %    PLATELET 214 043 - 992 K/uL    MPV 12.1 8.9 - 12.9 FL    NRBC 0.1 (H) 0.0  WBC    ABSOLUTE NRBC 0.02 (H) 0.00 - 0.01 K/uL    NEUTROPHILS 93 (H) 32 - 75 %    LYMPHOCYTES 3 (L) 12 - 49 %    MONOCYTES 2 (L) 5 - 13 %    EOSINOPHILS 0 0 - 7 %    BASOPHILS 0 0 - 1 %    IMMATURE GRANULOCYTES 2 (H) 0 - 0.5 %    ABS. NEUTROPHILS 20.8 (H) 1.8 - 8.0 K/UL    ABS. LYMPHOCYTES 0.8 0.8 - 3.5 K/UL    ABS. MONOCYTES 0.6 0.0 - 1.0 K/UL    ABS. EOSINOPHILS 0.0 0.0 - 0.4 K/UL    ABS. BASOPHILS 0.1 0.0 - 0.1 K/UL    ABS. IMM. GRANS. 0.4 (H) 0.00 - 0.04 K/UL    DF AUTOMATED     METABOLIC PANEL, COMPREHENSIVE    Collection Time: 08/11/21  4:40 AM   Result Value Ref Range    Sodium 145 136 - 145 mmol/L    Potassium 3.9 3.5 - 5.1 mmol/L    Chloride 111 (H) 97 - 108 mmol/L    CO2 30 21 - 32 mmol/L    Anion gap 4 (L) 5 - 15 mmol/L    Glucose 108 (H) 65 - 100 mg/dL    BUN 21 (H) 6 - 20 mg/dL    Creatinine 0.69 (L) 0.70 - 1.30 mg/dL    BUN/Creatinine ratio 30 (H) 12 - 20      GFR est AA >60 >60 ml/min/1.73m2    GFR est non-AA >60 >60 ml/min/1.73m2    Calcium 8.4 (L) 8.5 - 10.1 mg/dL    Bilirubin, total 0.6 0.2 - 1.0 mg/dL    AST (SGOT) 147 (H) 15 - 37 U/L    ALT (SGPT) 338 (H) 12 - 78 U/L    Alk.  phosphatase 94 45 - 117 U/L    Protein, total 6.4 6.4 - 8.2 g/dL    Albumin 2.2 (L) 3.5 - 5.0 g/dL    Globulin 4.2 (H) 2.0 - 4.0 g/dL    A-G Ratio 0.5 (L) 1.1 - 2.2       [unfilled]      Review of Systems  Limited secondary to unresponsive nature      Physical Exam:      Constitutional: pt is nonresponsive. HENT:   Head: Normocephalic and atraumatic. Eyes: Pupils are equal, round, and reactive to light. EOM are normal.   Cardiovascular: Normal rate, regular rhythm and normal heart sounds. Pulmonary/Chest: Breath sounds normal. No wheezes. No rales. Exhibits no tenderness. Patient is intubated. Abdominal: Soft. Bowel sounds are normal. There is no abdominal tenderness. There is no rebound and no guarding. Musculoskeletal: Normal range of motion. Neurological: pt is nonresponsive    XR CHEST PORT   Final Result      XR CHEST PORT   Final Result      XR CHEST PORT   Final Result   No significant interval change. XR CHEST PORT   Final Result   No significant change. XR CHEST PORT   Final Result   FINDINGS/IMPRESSION:   Support lines and tubes are appropriate in radiographic position. Persistent airspace disease throughout the left lung, likely pneumonia. Cardiac   silhouette stable in size. Hemodynamic status stable. XR CHEST PORT   Final Result      DUPLEX CAROTID BILATERAL   Final Result      XR CHEST PORT   Final Result      XR CHEST PORT   Final Result   ET tube placed, with tip 7 mm above the doretha. Asymmetric airspace   disease noted throughout the left lung, consistent with pneumonia, improved from   previous. Right lung clear. Stable normal heart size. Calcified aortic knob. XR CHEST PORT   Final Result   New diffuse airspace opacity in the left lung. MRI BRAIN WO CONT   Final Result   1. Restricted diffusion involving the cerebellar hemispheres, nemesio, and   possibly the left midbrain most likely representing acute ischemic infarcts   although the brainstem involvement may be partially artifactual. Consider a   short-term interval follow-up MRI.    2.  Attenuated vertebrobasilar flow voids which may be related to underlying   stenosis. CTA or MRA is recommended for further evaluation. 3.  Extensive multifocal encephalomalacia as described above. Report called to the patient's ICU nurse, Tu Staples at 5:45 PM on   8/4/2021 via phone conversation. XR CHEST PORT   Final Result   The cardiomediastinal silhouette is appropriate for age, technique,   and lung expansion. Pulmonary vasculature is not congested. The lungs are   essentially clear. No effusion or pneumothorax is seen. CT CODE NEURO HEAD WO CONTRAST   Final Result      No acute intracranial bleed. Bilateral areas of encephalomalacia. Atherosclerosis. A noncontrast head CT does not exclude the possibility of an   acute ischemic stroke. MRI is more sensitive for evaluation of brain parenchyma. Clinical correlation is recommended. Follow-up as clinically indicated. The   results were called and verbally communicated to Dr. Mayco Arias on 8/4/2021 at   1:00 AM.      Dental disease. Please see full report.       XR CHEST PORT    (Results Pending)        Recent Results (from the past 24 hour(s))   GLUCOSE, POC    Collection Time: 08/10/21  9:43 PM   Result Value Ref Range    Glucose (POC) 109 65 - 117 mg/dL    Performed by Tim Adams 79    RENAL FUNCTION PANEL    Collection Time: 08/11/21  4:40 AM   Result Value Ref Range    Sodium 144 136 - 145 mmol/L    Potassium 3.9 3.5 - 5.1 mmol/L    Chloride 111 (H) 97 - 108 mmol/L    CO2 29 21 - 32 mmol/L    Anion gap 4 (L) 5 - 15 mmol/L    Glucose 108 (H) 65 - 100 mg/dL    BUN 21 (H) 6 - 20 mg/dL    Creatinine 0.68 (L) 0.70 - 1.30 mg/dL    BUN/Creatinine ratio 31 (H) 12 - 20      GFR est AA >60 >60 ml/min/1.73m2    GFR est non-AA >60 >60 ml/min/1.73m2    Calcium 8.3 (L) 8.5 - 10.1 mg/dL    Phosphorus 3.6 2.6 - 4.7 mg/dL    Albumin 2.2 (L) 3.5 - 5.0 g/dL   CBC WITH AUTOMATED DIFF    Collection Time: 08/11/21  4:40 AM   Result Value Ref Range    WBC 22.6 (H) 4.1 - 11.1 K/uL    RBC 4.31 4.10 - 5.70 M/uL    HGB 13.0 12.1 - 17.0 g/dL    HCT 39.0 36.6 - 50.3 %    MCV 90.5 80.0 - 99.0 FL    MCH 30.2 26.0 - 34.0 PG    MCHC 33.3 30.0 - 36.5 g/dL    RDW 13.1 11.5 - 14.5 %    PLATELET 634 319 - 751 K/uL    MPV 12.1 8.9 - 12.9 FL    NRBC 0.1 (H) 0.0  WBC    ABSOLUTE NRBC 0.02 (H) 0.00 - 0.01 K/uL    NEUTROPHILS 93 (H) 32 - 75 %    LYMPHOCYTES 3 (L) 12 - 49 %    MONOCYTES 2 (L) 5 - 13 %    EOSINOPHILS 0 0 - 7 %    BASOPHILS 0 0 - 1 %    IMMATURE GRANULOCYTES 2 (H) 0 - 0.5 %    ABS. NEUTROPHILS 20.8 (H) 1.8 - 8.0 K/UL    ABS. LYMPHOCYTES 0.8 0.8 - 3.5 K/UL    ABS. MONOCYTES 0.6 0.0 - 1.0 K/UL    ABS. EOSINOPHILS 0.0 0.0 - 0.4 K/UL    ABS. BASOPHILS 0.1 0.0 - 0.1 K/UL    ABS. IMM. GRANS. 0.4 (H) 0.00 - 0.04 K/UL    DF AUTOMATED     METABOLIC PANEL, COMPREHENSIVE    Collection Time: 08/11/21  4:40 AM   Result Value Ref Range    Sodium 145 136 - 145 mmol/L    Potassium 3.9 3.5 - 5.1 mmol/L    Chloride 111 (H) 97 - 108 mmol/L    CO2 30 21 - 32 mmol/L    Anion gap 4 (L) 5 - 15 mmol/L    Glucose 108 (H) 65 - 100 mg/dL    BUN 21 (H) 6 - 20 mg/dL    Creatinine 0.69 (L) 0.70 - 1.30 mg/dL    BUN/Creatinine ratio 30 (H) 12 - 20      GFR est AA >60 >60 ml/min/1.73m2    GFR est non-AA >60 >60 ml/min/1.73m2    Calcium 8.4 (L) 8.5 - 10.1 mg/dL    Bilirubin, total 0.6 0.2 - 1.0 mg/dL    AST (SGOT) 147 (H) 15 - 37 U/L    ALT (SGPT) 338 (H) 12 - 78 U/L    Alk.  phosphatase 94 45 - 117 U/L    Protein, total 6.4 6.4 - 8.2 g/dL    Albumin 2.2 (L) 3.5 - 5.0 g/dL    Globulin 4.2 (H) 2.0 - 4.0 g/dL    A-G Ratio 0.5 (L) 1.1 - 2.2         Results     Procedure Component Value Units Date/Time    CULTURE, BLOOD, LINE [[id number]] Collected: 08/05/21 0102    Order Status: Completed Specimen: Blood Updated: 08/11/21 0912     Special Requests: No Special Requests        Culture result: No growth 5 days       CULTURE, RESPIRATORY/SPUTUM/BRONCH Demetrio Leana [[id number]] Collected: 08/04/21 2230    Order Status: Completed Specimen: Sputum Updated: 08/07/21 1134     Special Requests: No Special Requests        GRAM STAIN Rare WBCs seen         Rare EPITHELIAL CELLS               Occasional Gram Positive Cocci in clusters                  Occasional Gram Negative Rods           Culture result:       Light Normal respiratory jesus          CULTURE, URINE [[id number]] Collected: 08/04/21 1841    Order Status: Completed Specimen: Urine Updated: 08/06/21 0729     Special Requests: No Special Requests        Culture result: No Growth (<1000 cfu/mL)       MRSA SCREEN - PCR (NASAL) [[id number]] Collected: 08/04/21 1841    Order Status: Completed Specimen: Swab Updated: 08/05/21 0027     MRSA by PCR, Nasal Not Detected       CULTURE, URINE [[id number]] Collected: 08/04/21 1100    Order Status: Completed Specimen: Urine Updated: 08/05/21 1416     Special Requests: No Special Requests        Culture result:       No significant growth, <10,000 CFU/mL          CULTURE, BLOOD #2 [[id number]] Collected: 08/04/21 1025    Order Status: Completed Specimen: Blood Updated: 08/10/21 0839     Special Requests: No Special Requests        Culture result: No growth 6 days       CULTURE, BLOOD #1 [[id number]] Collected: 08/04/21 1020    Order Status: Completed Specimen: Blood Updated: 08/10/21 0839     Special Requests: No Special Requests        Culture result: No growth 6 days              Labs:     Recent Labs     08/11/21  0440 08/10/21  0455   WBC 22.6* 19.0*   HGB 13.0 11.4*   HCT 39.0 34.2*    155     Recent Labs     08/11/21  0440 08/10/21  0455 08/09/21  1050     145 142  139 142   K 3.9  3.9 3.8  4.2 4.2   *  111* 111*  109* 112*   CO2 29  30 27  26 27   BUN 21*  21* 19  20 20   CREA 0.68*  0.69* 0.59*  0.54* 0.66*   *  108* 129*  120* 115*   CA 8.3*  8.4* 8.0*  8.2* 7.8*   MG  --   --  2.4   PHOS 3.6 2.9 3.0     Recent Labs     08/11/21  0440 08/10/21  0455 08/09/21  1050   * 377* 433*   AP 94 102 105   TBILI 0.6 0.5 0.6 TP 6.4 5.9* 5.8*   ALB 2.2*  2.2* 2.0*  1.9* 1.9*  1.9*   GLOB 4.2* 3.9 3.9     No results for input(s): INR, PTP, APTT, INREXT, INREXT in the last 72 hours. No results for input(s): FE, TIBC, PSAT, FERR in the last 72 hours. No results found for: FOL, RBCF   Recent Labs     08/10/21  0451 08/09/21  0515   PH 7.51* 7.55*   PCO2 34* 29*   PO2 79 107*     No results for input(s): CPK, CKNDX, TROIQ in the last 72 hours.     No lab exists for component: CPKMB  No results found for: CHOL, CHOLX, CHLST, CHOLV, HDL, HDLP, LDL, LDLC, DLDLP, TGLX, TRIGL, TRIGP, CHHD, CHHDX  Lab Results   Component Value Date/Time    Glucose (POC) 109 08/10/2021 09:43 PM    Glucose (POC) 102 08/10/2021 06:37 AM    Glucose (POC) 117 08/09/2021 11:41 PM    Glucose (POC) 130 (H) 08/08/2021 08:41 PM    Glucose (POC) 147 (H) 08/08/2021 06:02 PM     Lab Results   Component Value Date/Time    Color Yellow/Straw 08/04/2021 06:41 PM    Appearance Clear 08/04/2021 06:41 PM    Specific gravity 1.026 08/04/2021 06:41 PM    pH (UA) 6.0 08/04/2021 06:41 PM    Protein 30 (A) 08/04/2021 06:41 PM    Glucose 50 (A) 08/04/2021 06:41 PM    Ketone 5 (A) 08/04/2021 06:41 PM    Bilirubin Negative 08/04/2021 06:41 PM    Urobilinogen 4.0 (H) 08/04/2021 06:41 PM    Nitrites Negative 08/04/2021 06:41 PM    Leukocyte Esterase Negative 08/04/2021 06:41 PM    Bacteria Negative 08/04/2021 06:41 PM    WBC 0-4 08/04/2021 06:41 PM    RBC 0-5 08/04/2021 06:41 PM         Assessment:   Acute Brainstem and cerebellar infarct  Acute hypoxemic respiratory failure intubated 50%  Aspiration pneumonia  Sepsis  History of CVA  History of seizures  Acute kidney injury  Leukocytosis      Plan:      Patient in ICU on ventilator 50% O2  Plan is upon arrival of patient's family around 10-11AM today, is to withdrawal care  On propofol drip for sedation  On Keppra 500 IV every 12 hours for seizures  On IV Zosyn for aspiration pneumonia  On aspirin and Lipitor  On Decadron  Continue IV fluids     We continue NG tube feedings  Dietitian consult     Overall prognosis poor       he is DNR now    Waiting for family to come for for terminal extubation        Current Facility-Administered Medications:     dexamethasone (DECADRON) 4 mg/mL injection 2 mg, 2 mg, IntraVENous, Q12H, Greg Connor MD    metoclopramide HCl (REGLAN) injection 5 mg, 5 mg, IntraVENous, Q6H, Greg Connor MD, 5 mg at 08/11/21 1105    heparin (porcine) injection 5,000 Units, 5,000 Units, SubCUTAneous, BID, Lupis Taylor MD, 5,000 Units at 08/11/21 1105    zinc oxide-white petrolatum 17-57 % topical paste, , Topical, TID, Lupis Taylor MD, Given at 08/11/21 1106    levETIRAcetam (KEPPRA) 500 mg in saline (iso-osm) 100 mL IVPB (premix), 500 mg, IntraVENous, Q12H, Lupis Taylor MD, 500 mg at 08/11/21 0740    metoclopramide HCl (REGLAN) injection 10 mg, 10 mg, IntraVENous, Q4H PRN, Alexa Hanna MD    0.9% sodium chloride infusion, 40 mL/hr, IntraVENous, CONTINUOUS, Greg Connor MD, Last Rate: 40 mL/hr at 08/08/21 1054, 40 mL/hr at 08/08/21 1054    acetaminophen (TYLENOL) tablet 650 mg, 650 mg, Oral, Q6H PRN, 650 mg at 08/06/21 2110 **OR** acetaminophen (TYLENOL) suppository 650 mg, 650 mg, Rectal, Q6H PRN, Mojgan Taylor MD    polyethylene glycol (MIRALAX) packet 17 g, 17 g, Oral, DAILY PRN, Mojgan Taylor MD    ondansetron (ZOFRAN ODT) tablet 4 mg, 4 mg, Oral, Q8H PRN **OR** ondansetron (ZOFRAN) injection 4 mg, 4 mg, IntraVENous, Q6H PRN, Lupis Taylor MD    aspirin tablet 325 mg, 325 mg, Oral, DAILY, Lupis Taylor MD, 325 mg at 08/11/21 1106    atorvastatin (LIPITOR) tablet 40 mg, 40 mg, Oral, QHS, Lupis Taylor MD, 40 mg at 08/10/21 2118    piperacillin-tazobactam (ZOSYN) 3.375 g in 0.9% sodium chloride (MBP/ADV) 100 mL MBP, 3.375 g, IntraVENous, Q8H, Lupis Taylor MD, Last Rate: 200 mL/hr at 08/11/21 1105, 3.375 g at 08/11/21 1105    propofol (DIPRIVAN) 10 mg/mL infusion, 0-50 mcg/kg/min, IntraVENous, TITRATE, Meet Gil MD, Last Rate: 5.9 mL/hr at 08/11/21 0035, 15 mcg/kg/min at 08/11/21 0035    NOREPINephrine (LEVOPHED) 8 mg in 5% dextrose 250mL (32 mcg/mL) infusion, 10 mcg/min, IntraVENous, TITRATE, Gene Taylor MD, Stopped at 08/05/21 1111                                                                                              *ATTENTION:  This note has been created by a medical student for educational purposes only. Please do not refer to the content of this note for clinical decision-making, billing, or other purposes. Please see attending physicians note to obtain clinical information on this patient. *

## 2021-08-11 NOTE — MED STUDENT NOTES
General Daily Progress Note          Patient Name:   Ariel Hamman       YOB: 1955       Age:  76 y.o. Admit Date: 8/4/2021      Subjective:     Patient admitted five days ago with altered mental status has remained unresponsive.  Patient's son reports that the patient's baseline, he is walking and talking.  A follow-up MRI revealed acute infarction of cerebellar hemispheres, nemesio and midbrian.  Chest x-ray since admission shows extensive new left-sided infiltrate, he is currently on the ventilator. Per Pulmonology, the patient has decreased vent support this morning, respirations have increase and O2 saturation had dropped, but has returned to 98% saturation per recent vitals. He is in the CCU, with no changes in mentation today. He remains intubated ad on ventilator at 50% O2    Per pulmonology, patient was experiencing worsening hypoxia yesterday and ventilator support increased. Improved with discontinuation of tube feedings. He was on Robinul and very likely had ileus with some reflux. Robinul at this time was discontinued. Reglan was added and if insignificant residuals then will restart tube feeding. A lengthy discussion was had with the patient's family yesterday, and the family stated the wishes to make the patient DNR and move to comfort care.     Objective:     Visit Vitals  BP 98/78   Pulse 96   Temp 100.4 °F (38 °C)   Resp 18   Ht 5' 5\" (1.651 m)   Wt 79.5 kg (175 lb 4.3 oz)   SpO2 99%   BMI 29.17 kg/m²        Recent Results (from the past 24 hour(s))   GLUCOSE, POC    Collection Time: 08/10/21  9:43 PM   Result Value Ref Range    Glucose (POC) 109 65 - 117 mg/dL    Performed by [facility]    RENAL FUNCTION PANEL    Collection Time: 08/11/21  4:40 AM   Result Value Ref Range    Sodium 144 136 - 145 mmol/L    Potassium 3.9 3.5 - 5.1 mmol/L    Chloride 111 (H) 97 - 108 mmol/L    CO2 29 21 - 32 mmol/L    Anion gap 4 (L) 5 - 15 mmol/L    Glucose 108 (H) 65 - 100 mg/dL BUN 21 (H) 6 - 20 mg/dL    Creatinine 0.68 (L) 0.70 - 1.30 mg/dL    BUN/Creatinine ratio 31 (H) 12 - 20      GFR est AA >60 >60 ml/min/1.73m2    GFR est non-AA >60 >60 ml/min/1.73m2    Calcium 8.3 (L) 8.5 - 10.1 mg/dL    Phosphorus 3.6 2.6 - 4.7 mg/dL    Albumin 2.2 (L) 3.5 - 5.0 g/dL   CBC WITH AUTOMATED DIFF    Collection Time: 08/11/21  4:40 AM   Result Value Ref Range    WBC 22.6 (H) 4.1 - 11.1 K/uL    RBC 4.31 4.10 - 5.70 M/uL    HGB 13.0 12.1 - 17.0 g/dL    HCT 39.0 36.6 - 50.3 %    MCV 90.5 80.0 - 99.0 FL    MCH 30.2 26.0 - 34.0 PG    MCHC 33.3 30.0 - 36.5 g/dL    RDW 13.1 11.5 - 14.5 %    PLATELET 935 706 - 001 K/uL    MPV 12.1 8.9 - 12.9 FL    NRBC 0.1 (H) 0.0  WBC    ABSOLUTE NRBC 0.02 (H) 0.00 - 0.01 K/uL    NEUTROPHILS 93 (H) 32 - 75 %    LYMPHOCYTES 3 (L) 12 - 49 %    MONOCYTES 2 (L) 5 - 13 %    EOSINOPHILS 0 0 - 7 %    BASOPHILS 0 0 - 1 %    IMMATURE GRANULOCYTES 2 (H) 0 - 0.5 %    ABS. NEUTROPHILS 20.8 (H) 1.8 - 8.0 K/UL    ABS. LYMPHOCYTES 0.8 0.8 - 3.5 K/UL    ABS. MONOCYTES 0.6 0.0 - 1.0 K/UL    ABS. EOSINOPHILS 0.0 0.0 - 0.4 K/UL    ABS. BASOPHILS 0.1 0.0 - 0.1 K/UL    ABS. IMM. GRANS. 0.4 (H) 0.00 - 0.04 K/UL    DF AUTOMATED     METABOLIC PANEL, COMPREHENSIVE    Collection Time: 08/11/21  4:40 AM   Result Value Ref Range    Sodium 145 136 - 145 mmol/L    Potassium 3.9 3.5 - 5.1 mmol/L    Chloride 111 (H) 97 - 108 mmol/L    CO2 30 21 - 32 mmol/L    Anion gap 4 (L) 5 - 15 mmol/L    Glucose 108 (H) 65 - 100 mg/dL    BUN 21 (H) 6 - 20 mg/dL    Creatinine 0.69 (L) 0.70 - 1.30 mg/dL    BUN/Creatinine ratio 30 (H) 12 - 20      GFR est AA >60 >60 ml/min/1.73m2    GFR est non-AA >60 >60 ml/min/1.73m2    Calcium 8.4 (L) 8.5 - 10.1 mg/dL    Bilirubin, total 0.6 0.2 - 1.0 mg/dL    AST (SGOT) 147 (H) 15 - 37 U/L    ALT (SGPT) 338 (H) 12 - 78 U/L    Alk.  phosphatase 94 45 - 117 U/L    Protein, total 6.4 6.4 - 8.2 g/dL    Albumin 2.2 (L) 3.5 - 5.0 g/dL    Globulin 4.2 (H) 2.0 - 4.0 g/dL    A-G Ratio 0.5 (L) 1.1 - 2.2       [unfilled]      Review of Systems  Limited secondary to unresponsive nature      Physical Exam:      Constitutional: pt is nonresponsive. HENT:   Head: Normocephalic and atraumatic. Eyes: Pupils are equal, round, and reactive to light. EOM are normal.   Cardiovascular: Normal rate, regular rhythm and normal heart sounds. Pulmonary/Chest: Breath sounds normal. No wheezes. No rales. Exhibits no tenderness. Patient is intubated. Abdominal: Soft. Bowel sounds are normal. There is no abdominal tenderness. There is no rebound and no guarding. Musculoskeletal: Normal range of motion. Neurological: pt is nonresponsive    XR CHEST PORT   Final Result      XR CHEST PORT   Final Result      XR CHEST PORT   Final Result   No significant interval change. XR CHEST PORT   Final Result   No significant change. XR CHEST PORT   Final Result   FINDINGS/IMPRESSION:   Support lines and tubes are appropriate in radiographic position. Persistent airspace disease throughout the left lung, likely pneumonia. Cardiac   silhouette stable in size. Hemodynamic status stable. XR CHEST PORT   Final Result      DUPLEX CAROTID BILATERAL   Final Result      XR CHEST PORT   Final Result      XR CHEST PORT   Final Result   ET tube placed, with tip 7 mm above the doretha. Asymmetric airspace   disease noted throughout the left lung, consistent with pneumonia, improved from   previous. Right lung clear. Stable normal heart size. Calcified aortic knob. XR CHEST PORT   Final Result   New diffuse airspace opacity in the left lung. MRI BRAIN WO CONT   Final Result   1. Restricted diffusion involving the cerebellar hemispheres, nemesio, and   possibly the left midbrain most likely representing acute ischemic infarcts   although the brainstem involvement may be partially artifactual. Consider a   short-term interval follow-up MRI.    2.  Attenuated vertebrobasilar flow voids which may be related to underlying   stenosis. CTA or MRA is recommended for further evaluation. 3.  Extensive multifocal encephalomalacia as described above. Report called to the patient's ICU nurse, Martha Gore at 5:45 PM on   8/4/2021 via phone conversation. XR CHEST PORT   Final Result   The cardiomediastinal silhouette is appropriate for age, technique,   and lung expansion. Pulmonary vasculature is not congested. The lungs are   essentially clear. No effusion or pneumothorax is seen. CT CODE NEURO HEAD WO CONTRAST   Final Result      No acute intracranial bleed. Bilateral areas of encephalomalacia. Atherosclerosis. A noncontrast head CT does not exclude the possibility of an   acute ischemic stroke. MRI is more sensitive for evaluation of brain parenchyma. Clinical correlation is recommended. Follow-up as clinically indicated. The   results were called and verbally communicated to Dr. Coleen Aguilera on 8/4/2021 at   1:00 AM.      Dental disease. Please see full report.       XR CHEST PORT    (Results Pending)        Recent Results (from the past 24 hour(s))   GLUCOSE, POC    Collection Time: 08/10/21  9:43 PM   Result Value Ref Range    Glucose (POC) 109 65 - 117 mg/dL    Performed by [facility]    RENAL FUNCTION PANEL    Collection Time: 08/11/21  4:40 AM   Result Value Ref Range    Sodium 144 136 - 145 mmol/L    Potassium 3.9 3.5 - 5.1 mmol/L    Chloride 111 (H) 97 - 108 mmol/L    CO2 29 21 - 32 mmol/L    Anion gap 4 (L) 5 - 15 mmol/L    Glucose 108 (H) 65 - 100 mg/dL    BUN 21 (H) 6 - 20 mg/dL    Creatinine 0.68 (L) 0.70 - 1.30 mg/dL    BUN/Creatinine ratio 31 (H) 12 - 20      GFR est AA >60 >60 ml/min/1.73m2    GFR est non-AA >60 >60 ml/min/1.73m2    Calcium 8.3 (L) 8.5 - 10.1 mg/dL    Phosphorus 3.6 2.6 - 4.7 mg/dL    Albumin 2.2 (L) 3.5 - 5.0 g/dL   CBC WITH AUTOMATED DIFF    Collection Time: 08/11/21  4:40 AM   Result Value Ref Range    WBC 22.6 (H) 4.1 - 11.1 K/uL    RBC 4.31 4.10 - 5.70 M/uL    HGB 13.0 12.1 - 17.0 g/dL    HCT 39.0 36.6 - 50.3 %    MCV 90.5 80.0 - 99.0 FL    MCH 30.2 26.0 - 34.0 PG    MCHC 33.3 30.0 - 36.5 g/dL    RDW 13.1 11.5 - 14.5 %    PLATELET 982 221 - 746 K/uL    MPV 12.1 8.9 - 12.9 FL    NRBC 0.1 (H) 0.0  WBC    ABSOLUTE NRBC 0.02 (H) 0.00 - 0.01 K/uL    NEUTROPHILS 93 (H) 32 - 75 %    LYMPHOCYTES 3 (L) 12 - 49 %    MONOCYTES 2 (L) 5 - 13 %    EOSINOPHILS 0 0 - 7 %    BASOPHILS 0 0 - 1 %    IMMATURE GRANULOCYTES 2 (H) 0 - 0.5 %    ABS. NEUTROPHILS 20.8 (H) 1.8 - 8.0 K/UL    ABS. LYMPHOCYTES 0.8 0.8 - 3.5 K/UL    ABS. MONOCYTES 0.6 0.0 - 1.0 K/UL    ABS. EOSINOPHILS 0.0 0.0 - 0.4 K/UL    ABS. BASOPHILS 0.1 0.0 - 0.1 K/UL    ABS. IMM. GRANS. 0.4 (H) 0.00 - 0.04 K/UL    DF AUTOMATED     METABOLIC PANEL, COMPREHENSIVE    Collection Time: 08/11/21  4:40 AM   Result Value Ref Range    Sodium 145 136 - 145 mmol/L    Potassium 3.9 3.5 - 5.1 mmol/L    Chloride 111 (H) 97 - 108 mmol/L    CO2 30 21 - 32 mmol/L    Anion gap 4 (L) 5 - 15 mmol/L    Glucose 108 (H) 65 - 100 mg/dL    BUN 21 (H) 6 - 20 mg/dL    Creatinine 0.69 (L) 0.70 - 1.30 mg/dL    BUN/Creatinine ratio 30 (H) 12 - 20      GFR est AA >60 >60 ml/min/1.73m2    GFR est non-AA >60 >60 ml/min/1.73m2    Calcium 8.4 (L) 8.5 - 10.1 mg/dL    Bilirubin, total 0.6 0.2 - 1.0 mg/dL    AST (SGOT) 147 (H) 15 - 37 U/L    ALT (SGPT) 338 (H) 12 - 78 U/L    Alk.  phosphatase 94 45 - 117 U/L    Protein, total 6.4 6.4 - 8.2 g/dL    Albumin 2.2 (L) 3.5 - 5.0 g/dL    Globulin 4.2 (H) 2.0 - 4.0 g/dL    A-G Ratio 0.5 (L) 1.1 - 2.2         Results     Procedure Component Value Units Date/Time    CULTURE, BLOOD, LINE [[id number]] Collected: 08/05/21 0102    Order Status: Completed Specimen: Blood Updated: 08/11/21 0912     Special Requests: No Special Requests        Culture result: No growth 5 days       CULTURE, RESPIRATORY/SPUTUM/BRONCH [facility] [[id number]] Collected: 08/04/21 2230    Order Status: Completed Specimen: Sputum Updated: 08/07/21 1134     Special Requests: No Special Requests        GRAM STAIN Rare WBCs seen         Rare EPITHELIAL CELLS               Occasional Gram Positive Cocci in clusters                  Occasional Gram Negative Rods           Culture result:       Light Normal respiratory jesus          CULTURE, URINE [[id number]] Collected: 08/04/21 1841    Order Status: Completed Specimen: Urine Updated: 08/06/21 0729     Special Requests: No Special Requests        Culture result: No Growth (<1000 cfu/mL)       MRSA SCREEN - PCR (NASAL) [[id number]] Collected: 08/04/21 1841    Order Status: Completed Specimen: Swab Updated: 08/05/21 0027     MRSA by PCR, Nasal Not Detected       CULTURE, URINE [[id number]] Collected: 08/04/21 1100    Order Status: Completed Specimen: Urine Updated: 08/05/21 1416     Special Requests: No Special Requests        Culture result:       No significant growth, <10,000 CFU/mL          CULTURE, BLOOD #2 [[id number]] Collected: 08/04/21 1025    Order Status: Completed Specimen: Blood Updated: 08/10/21 0839     Special Requests: No Special Requests        Culture result: No growth 6 days       CULTURE, BLOOD #1 [[id number]] Collected: 08/04/21 1020    Order Status: Completed Specimen: Blood Updated: 08/10/21 0839     Special Requests: No Special Requests        Culture result: No growth 6 days              Labs:     Recent Labs     08/11/21  0440 08/10/21  0455   WBC 22.6* 19.0*   HGB 13.0 11.4*   HCT 39.0 34.2*    155     Recent Labs     08/11/21  0440 08/10/21  0455 08/09/21  1050     145 142  139 142   K 3.9  3.9 3.8  4.2 4.2   *  111* 111*  109* 112*   CO2 29  30 27  26 27   BUN 21*  21* 19  20 20   CREA 0.68*  0.69* 0.59*  0.54* 0.66*   *  108* 129*  120* 115*   CA 8.3*  8.4* 8.0*  8.2* 7.8*   MG  --   --  2.4   PHOS 3.6 2.9 3.0     Recent Labs     08/11/21  0440 08/10/21  0455 08/09/21  1050   * 377* 433*   AP 94 102 105   TBILI 0.6 0.5 0.6   TP 6.4 5. 9* 5.8*   ALB 2.2*  2.2* 2.0*  1.9* 1.9*  1.9*   GLOB 4.2* 3.9 3.9     No results for input(s): INR, PTP, APTT, INREXT, INREXT in the last 72 hours. No results for input(s): FE, TIBC, PSAT, FERR in the last 72 hours. No results found for: FOL, RBCF   Recent Labs     08/10/21  0451 08/09/21  0515   PH 7.51* 7.55*   PCO2 34* 29*   PO2 79 107*     No results for input(s): CPK, CKNDX, TROIQ in the last 72 hours.     No lab exists for component: CPKMB  No results found for: CHOL, CHOLX, CHLST, CHOLV, HDL, HDLP, LDL, LDLC, DLDLP, TGLX, TRIGL, TRIGP, CHHD, CHHDX  Lab Results   Component Value Date/Time    Glucose (POC) 109 08/10/2021 09:43 PM    Glucose (POC) 102 08/10/2021 06:37 AM    Glucose (POC) 117 08/09/2021 11:41 PM    Glucose (POC) 130 (H) 08/08/2021 08:41 PM    Glucose (POC) 147 (H) 08/08/2021 06:02 PM     Lab Results   Component Value Date/Time    Color Yellow/Straw 08/04/2021 06:41 PM    Appearance Clear 08/04/2021 06:41 PM    Specific gravity 1.026 08/04/2021 06:41 PM    pH (UA) 6.0 08/04/2021 06:41 PM    Protein 30 (A) 08/04/2021 06:41 PM    Glucose 50 (A) 08/04/2021 06:41 PM    Ketone 5 (A) 08/04/2021 06:41 PM    Bilirubin Negative 08/04/2021 06:41 PM    Urobilinogen 4.0 (H) 08/04/2021 06:41 PM    Nitrites Negative 08/04/2021 06:41 PM    Leukocyte Esterase Negative 08/04/2021 06:41 PM    Bacteria Negative 08/04/2021 06:41 PM    WBC 0-4 08/04/2021 06:41 PM    RBC 0-5 08/04/2021 06:41 PM         Assessment:   Acute Brainstem and cerebellar infarct  Acute hypoxemic respiratory failure intubated 50%  Aspiration pneumonia  Sepsis  History of CVA  History of seizures  Acute kidney injury  Leukocytosis      Plan:      Patient in ICU on ventilator 50% O2  Plan is upon arrival of patient's family around 10-11AM today, is to withdrawal care  On propofol drip for sedation  On Keppra 500 IV every 12 hours for seizures  On IV Zosyn for aspiration pneumonia  On aspirin and Lipitor  On Decadron  Continue IV fluids     We continue NG tube feedings  Dietitian consult     Overall prognosis poor       he is DNR now    Waiting for family to come for for terminal extubation     Patient family is here they want  terminal extubation and comfort care        Current Facility-Administered Medications:     dexamethasone (DECADRON) 4 mg/mL injection 2 mg, 2 mg, IntraVENous, Q12H, Wood Fletcher MD    metoclopramide HCl (REGLAN) injection 5 mg, 5 mg, IntraVENous, Q6H, Wood Fletcher MD, 5 mg at 08/11/21 1105    heparin (porcine) injection 5,000 Units, 5,000 Units, SubCUTAneous, BID, Mp Hines MD, 5,000 Units at 08/11/21 1105    zinc oxide-white petrolatum 17-57 % topical paste, , Topical, TID, Mp Hines MD, Given at 08/11/21 1106    levETIRAcetam (KEPPRA) 500 mg in saline (iso-osm) 100 mL IVPB (premix), 500 mg, IntraVENous, Q12H, Lupis Taylor MD, 500 mg at 08/11/21 0740    metoclopramide HCl (REGLAN) injection 10 mg, 10 mg, IntraVENous, Q4H PRN, Mp Hines MD    0.9% sodium chloride infusion, 40 mL/hr, IntraVENous, CONTINUOUS, Wood Fletcher MD, Last Rate: 40 mL/hr at 08/08/21 1054, 40 mL/hr at 08/08/21 1054    acetaminophen (TYLENOL) tablet 650 mg, 650 mg, Oral, Q6H PRN, 650 mg at 08/06/21 2110 **OR** acetaminophen (TYLENOL) suppository 650 mg, 650 mg, Rectal, Q6H PRN, Brittanie Taylor MD    polyethylene glycol (MIRALAX) packet 17 g, 17 g, Oral, DAILY PRN, Brittanie Taylor MD    ondansetron (ZOFRAN ODT) tablet 4 mg, 4 mg, Oral, Q8H PRN **OR** ondansetron (ZOFRAN) injection 4 mg, 4 mg, IntraVENous, Q6H PRN, Lupis Taylor MD    aspirin tablet 325 mg, 325 mg, Oral, DAILY, Lupis Taylor MD, 325 mg at 08/11/21 1106    atorvastatin (LIPITOR) tablet 40 mg, 40 mg, Oral, QHS, Lupis Taylor MD, 40 mg at 08/10/21 2118    piperacillin-tazobactam (ZOSYN) 3.375 g in 0.9% sodium chloride (MBP/ADV) 100 mL MBP, 3.375 g, IntraVENous, Q8H, Lupis Taylor MD, Last Rate: 200 mL/hr at 08/11/21 1105, 3.375 g at 08/11/21 1105    propofol (DIPRIVAN) 10 mg/mL infusion, 0-50 mcg/kg/min, IntraVENous, TITRATE, Elia Gould MD, Last Rate: 5.9 mL/hr at 08/11/21 0035, 15 mcg/kg/min at 08/11/21 0035    NOREPINephrine (LEVOPHED) 8 mg in 5% dextrose 250mL (32 mcg/mL) infusion, 10 mcg/min, IntraVENous, TITRATE, Bibiana Taylor MD, Stopped at 08/05/21 1111                                                                                              *ATTENTION:  This note has been created by a medical student for educational purposes only. Please do not refer to the content of this note for clinical decision-making, billing, or other purposes. Please see attending physicians note to obtain clinical information on this patient. *

## 2021-08-11 NOTE — PROGRESS NOTES
Nutrition Note    Pt now transitioned to comfort care, extubation today. RD to d/c TF as no longer indicated. Please reconsult as appropriate.     Electronically signed by Tom Reyes on 8/11/2021 at 12:28 PM    Contact:

## 2021-08-12 NOTE — HOSPICE
[facility]  [phone]     Patient Name: Dinh Raza  YOB: 1955  Age: 76 y.o. [facility] RN Note:  Hospice consult received, reviewing chart. Will follow up with Unit Nurse and Care Manager to discuss plan of care, patient status and discharge disposition within the hour. Chart reviewed. Patient was transitioned to [address] [place] [address], is now unresponsive on room air. Hypotensive but vital signs otherwise stable. Received scheduled IV Ativan w/ PRNs given. Will likely meet IP hospice criteria    Telephone call made to patient's son/annea Cassie Matthew. Discussed hospice services and levels of care and medicare coverage. Son has left the hospital for the night. Appreciative of information and call. Mr Josefina Leblanc would like to meet in person at patient's bedside tomorrow morning at 10am   to discuss hospice admission further. Probable IP admission either at [facility] or could consider transfer to [facility] if appropriate. Thank you for the opportunity to be of service to this patient.

## 2021-08-12 NOTE — PROGRESS NOTES
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  Respiratory failure  Mental status changes  Left spastic hemiparesis  Other medical problems      EXAM:  Patient transferred to the floor  Mouth open, eyes closed  Nonresponsive to all verbal stimulus  Corneals sluggish  Pupils sluggish  Cephalic sluggish  Decerebrates      ASSESSMENT/PLAN:  Neurological status has not improved  Prognosis still potentially kilometers  ALLERGIES:    No Known Allergies    MEDS:      Current Facility-Administered Medications:     morphine injection 2 mg, 2 mg, IntraVENous, Q4H PRN, Lupis Taylor MD, 2 mg at 08/12/21 0414    LORazepam (ATIVAN) injection 2 mg, 2 mg, IntraVENous, Q2H PRN, Lupis Taylor MD, 2 mg at 08/12/21 0414    metoclopramide HCl (REGLAN) injection 10 mg, 10 mg, IntraVENous, Q4H PRN, Brittanie Taylor MD    acetaminophen (TYLENOL) tablet 650 mg, 650 mg, Oral, Q6H PRN, 650 mg at 08/06/21 2110 **OR** acetaminophen (TYLENOL) suppository 650 mg, 650 mg, Rectal, Q6H PRN, Brittanie Taylor MD    polyethylene glycol (MIRALAX) packet 17 g, 17 g, Oral, DAILY PRN, Brittanie Taylor MD    ondansetron (ZOFRAN ODT) tablet 4 mg, 4 mg, Oral, Q8H PRN **OR** ondansetron (ZOFRAN) injection 4 mg, 4 mg, IntraVENous, Q6H PRN, Lupis Taylor MD    LABS:  Recent Results (from the past 24 hour(s))   GLUCOSE, POC    Collection Time: 08/12/21  7:16 AM   Result Value Ref Range    Glucose (POC) 70 65 - 117 mg/dL    Performed by Arvel Lesch Yudong(PCT)        Visit Vitals  BP (!) 83/51 (BP 1 Location: Left lower arm, BP Patient Position: At rest)   Pulse (!) 110   Temp 98.3 °F (36.8 °C)   Resp 18   Ht 5' 5\" (1.651 m)   Wt 79.5 kg (175 lb 4.3 oz)   SpO2 (!) 89%   BMI 29.17 kg/m²       Imaging:  XR CHEST PORT   Final Result      XR CHEST PORT   Final Result      XR CHEST PORT   Final Result   No significant interval change. XR CHEST PORT   Final Result   No significant change.       XR CHEST PORT Final Result   FINDINGS/IMPRESSION:   Support lines and tubes are appropriate in radiographic position. Persistent airspace disease throughout the left lung, likely pneumonia. Cardiac   silhouette stable in size. Hemodynamic status stable. XR CHEST PORT   Final Result      DUPLEX CAROTID BILATERAL   Final Result      XR CHEST PORT   Final Result      XR CHEST PORT   Final Result   ET tube placed, with tip 7 mm above the doretha. Asymmetric airspace   disease noted throughout the left lung, consistent with pneumonia, improved from   previous. Right lung clear. Stable normal heart size. Calcified aortic knob. XR CHEST PORT   Final Result   New diffuse airspace opacity in the left lung. MRI BRAIN WO CONT   Final Result   1. Restricted diffusion involving the cerebellar hemispheres, nemesio, and   possibly the left midbrain most likely representing acute ischemic infarcts   although the brainstem involvement may be partially artifactual. Consider a   short-term interval follow-up MRI. 2.  Attenuated vertebrobasilar flow voids which may be related to underlying   stenosis. CTA or MRA is recommended for further evaluation. 3.  Extensive multifocal encephalomalacia as described above. Report called to the patient's ICU nurse, Casimiro Zapien at 5:45 PM on   8/4/2021 via phone conversation. XR CHEST PORT   Final Result   The cardiomediastinal silhouette is appropriate for age, technique,   and lung expansion. Pulmonary vasculature is not congested. The lungs are   essentially clear. No effusion or pneumothorax is seen. CT CODE NEURO HEAD WO CONTRAST   Final Result      No acute intracranial bleed. Bilateral areas of encephalomalacia. Atherosclerosis. A noncontrast head CT does not exclude the possibility of an   acute ischemic stroke. MRI is more sensitive for evaluation of brain parenchyma. Clinical correlation is recommended. Follow-up as clinically indicated.  The results were called and verbally communicated to Dr. Lakia Cage on 8/4/2021 at   1:00 AM.      Dental disease. Please see full report.

## 2021-08-12 NOTE — PROGRESS NOTES
General Daily Progress Note          Patient Name:   Eda Gilmore       YOB: 1955       Age:  76 y.o. Admit Date: 8/4/2021      Subjective:     Patient admitted five days ago with altered mental status has remained unresponsive.  Patient's son reports that the patient's baseline, he is walking and talking.  A follow-up MRI revealed acute infarction of cerebellar hemispheres, nemesio and midbrian.  Chest x-ray since admission shows extensive new left-sided infiltrate, he formerly was on the ventilator.      A lengthy discussion was had with the patient's family 2 days ago, and the family stated the wishes to make the patient DNR and move to comfort care. At 1215, yesterday the patient was extubated. At this time the patient was transitioned to comfort care. Objective:     Visit Vitals  BP (!) 83/51 (BP 1 Location: Left lower arm, BP Patient Position: At rest)   Pulse (!) 110   Temp 98.3 °F (36.8 °C)   Resp 18   Ht 5' 5\" (1.651 m)   Wt 79.5 kg (175 lb 4.3 oz)   SpO2 91%   BMI 29.17 kg/m²        Recent Results (from the past 24 hour(s))   GLUCOSE, POC    Collection Time: 08/12/21  7:16 AM   Result Value Ref Range    Glucose (POC) 70 65 - 117 mg/dL    Performed by Jonathan Puri(PCT)      [unfilled]      Review of Systems    Limited due to unresponsive state of patient      Physical Exam:      Constitutional: pt is non-responsive. HENT:   Head: Normocephalic and atraumatic. Eyes: Pupils are equal, round, and reactive to light. EOM are normal.   Cardiovascular: Normal rate, regular rhythm and normal heart sounds. Pulmonary/Chest: Breath sounds normal. No wheezes. No rales. Exhibits no tenderness. Abdominal: Soft. Bowel sounds are normal. There is no abdominal tenderness. There is no rebound and no guarding. Musculoskeletal: Normal range of motion.    Neurological: pt is non-responsive and posturing (decerebrate)     XR CHEST PORT   Final Result      XR CHEST PORT   Final Result XR CHEST PORT   Final Result   No significant interval change. XR CHEST PORT   Final Result   No significant change. XR CHEST PORT   Final Result   FINDINGS/IMPRESSION:   Support lines and tubes are appropriate in radiographic position. Persistent airspace disease throughout the left lung, likely pneumonia. Cardiac   silhouette stable in size. Hemodynamic status stable. XR CHEST PORT   Final Result      DUPLEX CAROTID BILATERAL   Final Result      XR CHEST PORT   Final Result      XR CHEST PORT   Final Result   ET tube placed, with tip 7 mm above the doretha. Asymmetric airspace   disease noted throughout the left lung, consistent with pneumonia, improved from   previous. Right lung clear. Stable normal heart size. Calcified aortic knob. XR CHEST PORT   Final Result   New diffuse airspace opacity in the left lung. MRI BRAIN WO CONT   Final Result   1. Restricted diffusion involving the cerebellar hemispheres, nemesio, and   possibly the left midbrain most likely representing acute ischemic infarcts   although the brainstem involvement may be partially artifactual. Consider a   short-term interval follow-up MRI. 2.  Attenuated vertebrobasilar flow voids which may be related to underlying   stenosis. CTA or MRA is recommended for further evaluation. 3.  Extensive multifocal encephalomalacia as described above. Report called to the patient's ICU nurse, Donnell Daley at 5:45 PM on   8/4/2021 via phone conversation. XR CHEST PORT   Final Result   The cardiomediastinal silhouette is appropriate for age, technique,   and lung expansion. Pulmonary vasculature is not congested. The lungs are   essentially clear. No effusion or pneumothorax is seen. CT CODE NEURO HEAD WO CONTRAST   Final Result      No acute intracranial bleed. Bilateral areas of encephalomalacia. Atherosclerosis.  A noncontrast head CT does not exclude the possibility of an   acute ischemic stroke. MRI is more sensitive for evaluation of brain parenchyma. Clinical correlation is recommended. Follow-up as clinically indicated. The   results were called and verbally communicated to Dr. Radha Jefferson on 8/4/2021 at   1:00 AM.      Dental disease. Please see full report.            Recent Results (from the past 24 hour(s))   GLUCOSE, POC    Collection Time: 08/12/21  7:16 AM   Result Value Ref Range    Glucose (POC) 70 65 - 117 mg/dL    Performed by Rikki Puri(PCT)        Results     Procedure Component Value Units Date/Time    CULTURE, BLOOD, LINE [[id number]] Collected: 08/05/21 0102    Order Status: Completed Specimen: Blood Updated: 08/12/21 0739     Special Requests: No Special Requests        Culture result: No growth 6 days       CULTURE, RESPIRATORY/SPUTUM/BRONCH Jaqueline Little STAIN [[id number]] Collected: 08/04/21 2230    Order Status: Completed Specimen: Sputum Updated: 08/07/21 1134     Special Requests: No Special Requests        GRAM STAIN Rare WBCs seen         Rare EPITHELIAL CELLS               Occasional Gram Positive Cocci in clusters                  Occasional Gram Negative Rods           Culture result:       Light Normal respiratory jesus          CULTURE, URINE [[id number]] Collected: 08/04/21 1841    Order Status: Completed Specimen: Urine Updated: 08/06/21 0729     Special Requests: No Special Requests        Culture result: No Growth (<1000 cfu/mL)       MRSA SCREEN - PCR (NASAL) [[id number]] Collected: 08/04/21 1841    Order Status: Completed Specimen: Swab Updated: 08/05/21 0027     MRSA by PCR, Nasal Not Detected       CULTURE, URINE [[id number]] Collected: 08/04/21 1100    Order Status: Completed Specimen: Urine Updated: 08/05/21 1416     Special Requests: No Special Requests        Culture result:       No significant growth, <10,000 CFU/mL          CULTURE, BLOOD #2 [[id number]] Collected: 08/04/21 1025    Order Status: Completed Specimen: Blood Updated: 08/10/21 0839     Special Requests: No Special Requests        Culture result: No growth 6 days       CULTURE, BLOOD #1 [[id number]] Collected: 08/04/21 1020    Order Status: Completed Specimen: Blood Updated: 08/10/21 0839     Special Requests: No Special Requests        Culture result: No growth 6 days              Labs:     Recent Labs     08/11/21  0440 08/10/21  0455   WBC 22.6* 19.0*   HGB 13.0 11.4*   HCT 39.0 34.2*    155     Recent Labs     08/11/21  0440 08/10/21  0455     145 142  139   K 3.9  3.9 3.8  4.2   *  111* 111*  109*   CO2 29  30 27  26   BUN 21*  21* 19  20   CREA 0.68*  0.69* 0.59*  0.54*   *  108* 129*  120*   CA 8.3*  8.4* 8.0*  8.2*   PHOS 3.6 2.9     Recent Labs     08/11/21  0440 08/10/21  0455   * 377*   AP 94 102   TBILI 0.6 0.5   TP 6.4 5.9*   ALB 2.2*  2.2* 2.0*  1.9*   GLOB 4.2* 3.9     No results for input(s): INR, PTP, APTT, INREXT, INREXT in the last 72 hours. No results for input(s): FE, TIBC, PSAT, FERR in the last 72 hours. No results found for: FOL, RBCF   Recent Labs     08/10/21  0451   PH 7.51*   PCO2 34*   PO2 79     No results for input(s): CPK, CKNDX, TROIQ in the last 72 hours.     No lab exists for component: CPKMB  No results found for: CHOL, CHOLX, CHLST, CHOLV, HDL, HDLP, LDL, LDLC, DLDLP, TGLX, TRIGL, TRIGP, CHHD, CHHDX  Lab Results   Component Value Date/Time    Glucose (POC) 70 08/12/2021 07:16 AM    Glucose (POC) 109 08/10/2021 09:43 PM    Glucose (POC) 102 08/10/2021 06:37 AM    Glucose (POC) 117 08/09/2021 11:41 PM    Glucose (POC) 130 (H) 08/08/2021 08:41 PM     Lab Results   Component Value Date/Time    Color Yellow/Straw 08/04/2021 06:41 PM    Appearance Clear 08/04/2021 06:41 PM    Specific gravity 1.026 08/04/2021 06:41 PM    pH (UA) 6.0 08/04/2021 06:41 PM    Protein 30 (A) 08/04/2021 06:41 PM    Glucose 50 (A) 08/04/2021 06:41 PM    Ketone 5 (A) 08/04/2021 06:41 PM    Bilirubin Negative 08/04/2021 06:41 PM    Urobilinogen 4.0 (H) 08/04/2021 06:41 PM    Nitrites Negative 08/04/2021 06:41 PM    Leukocyte Esterase Negative 08/04/2021 06:41 PM    Bacteria Negative 08/04/2021 06:41 PM    WBC 0-4 08/04/2021 06:41 PM    RBC 0-5 08/04/2021 06:41 PM         Assessment:   Acute Brainstem and cerebellar infarct  Acute hypoxemic respiratory failure intubated at 50% O2, now extubated and moved to comfort care  Aspiration pneumonia  Sepsis  History of CVA  History of seizures  Acute kidney injury  Leukocytosis        Plan:     Patients family stated they want to terminate extubation and move to comfort care  Morphine 2mg every 4 hours PRN, Ativan 4mg every 2 hours PRN,        Overall prognosis poor        Current Facility-Administered Medications:     LORazepam (ATIVAN) injection 2 mg, 2 mg, IntraVENous, Q2H, Lupis Taylor MD    morphine injection 2 mg, 2 mg, IntraVENous, Q4H PRN, Lupis Taylor MD, 2 mg at 08/12/21 0414    LORazepam (ATIVAN) injection 2 mg, 2 mg, IntraVENous, Q2H PRN, Lupis Taylor MD, 2 mg at 08/12/21 0414    metoclopramide HCl (REGLAN) injection 10 mg, 10 mg, IntraVENous, Q4H PRN, Jadon Taylor MD    acetaminophen (TYLENOL) tablet 650 mg, 650 mg, Oral, Q6H PRN, 650 mg at 08/06/21 2110 **OR** acetaminophen (TYLENOL) suppository 650 mg, 650 mg, Rectal, Q6H PRN, Lupis Taylor MD    polyethylene glycol (MIRALAX) packet 17 g, 17 g, Oral, DAILY PRN, Lupis Taylor MD    ondansetron (ZOFRAN ODT) tablet 4 mg, 4 mg, Oral, Q8H PRN **OR** ondansetron (ZOFRAN) injection 4 mg, 4 mg, IntraVENous, Q6H PRN, Daina Yung MD

## 2021-08-12 NOTE — PROGRESS NOTES
TRANSFER - OUT REPORT:    Verbal report given to Mandeep Roldan RN on Latrelle Levels  being transferred to [facility] for routine progression of care       Report consisted of patients Situation, Background, Assessment and   Recommendations(SBAR). Information from the following report(s) SBAR, Kardex, Intake/Output and MAR was reviewed with the receiving nurse. Lines:   Peripheral IV 08/05/21 Proximal;Right Cephalic (Active)   Site Assessment Clean, dry, & intact 08/12/21 0100   Phlebitis Assessment 0 08/12/21 0100   Infiltration Assessment 0 08/12/21 0100   Dressing Status Clean, dry, & intact 08/12/21 0100   Dressing Type Transparent 08/12/21 0100   Hub Color/Line Status Patent; Flushed 08/12/21 0100   Action Taken Open ports on tubing capped 08/11/21 1500   Alcohol Cap Used Yes 08/12/21 0100       Peripheral IV 08/09/21 Anterior;Proximal;Right;Mid Forearm (Active)   Site Assessment Clean, dry, & intact 08/12/21 0149   Phlebitis Assessment 0 08/12/21 0149   Infiltration Assessment 0 08/12/21 0149   Dressing Status Clean, dry, & intact 08/12/21 0149   Dressing Type Transparent 08/12/21 0149   Hub Color/Line Status Flushed;Patent 08/12/21 0149   Action Taken Open ports on tubing capped 08/11/21 1500   Alcohol Cap Used Yes 08/12/21 0149        Opportunity for questions and clarification was provided.       Patient transported with:   Registered Nurse

## 2021-08-12 NOTE — ROUTINE PROCESS
Primary Nurse Deedee Snellen, RN and Malena Tan RN performed a dual skin assessment on this patient Impairment noted- see wound doc flow sheet  Eliud score is 10. Moisture associated noted to axillary and groin. No skin tear noted to perineum. No other skin breakdown or wounds noted at this time. Skin saving measures in place and will continue to implement.

## 2021-08-12 NOTE — PROGRESS NOTES
DC Plan: Hospice    Pt transferred from the ICU to . Cm received a consult for hospice. Cm met with pt and pt's son - Neno Zapata. at the bedside. Cm presented hospice choice list. Son would like some time to review hospice choice list.     Son approached writer at the  nurses station. Choice letter signed for [facility]. Son would like his father to receive hospice in a facility. Pt does not have Medicaid. Son indicated his father is a . CM informed son the [facility] has an inpatient hospice unit. Son is agreeable with Cm following up with the [facility] to see if they are accepting pt's into their hospice unit. Cm presented and dicsussed [facility] Form. Son signed the form. If the [facility] is unable to accept, Cm will f/up with pt's son for SNF choices and get pt screened for Medicaid. Referral made via [facility].

## 2021-08-12 NOTE — PROGRESS NOTES
[de-identified]Family Notified[de-identified]    Son, Alexander Pham. , and [place] notified that patient has been transferred to [address] to room 408. Questions answered and family aware of best way to reach [address] D, RN will be taking over patient care at this time.

## 2021-08-12 NOTE — MED STUDENT NOTES
General Daily Progress Note          Patient Name:   Vipul Barry       YOB: 1955       Age:  76 y.o. Admit Date: 8/4/2021      Subjective:     Patient admitted five days ago with altered mental status has remained unresponsive.  Patient's son reports that the patient's baseline, he is walking and talking.  A follow-up MRI revealed acute infarction of cerebellar hemispheres, nemesio and midbrian.  Chest x-ray since admission shows extensive new left-sided infiltrate, he formerly was on the ventilator.      A lengthy discussion was had with the patient's family 2 days ago, and the family stated the wishes to make the patient DNR and move to comfort care. At 1215, yesterday the patient was extubated. At this time the patient was transitioned to comfort care. Objective:     Visit Vitals  BP (!) 83/51 (BP 1 Location: Left lower arm, BP Patient Position: At rest)   Pulse (!) 110   Temp 98.3 °F (36.8 °C)   Resp 18   Ht 5' 5\" (1.651 m)   Wt 175 lb 4.3 oz (79.5 kg)   SpO2 (!) 89%   BMI 29.17 kg/m²        Recent Results (from the past 24 hour(s))   GLUCOSE, POC    Collection Time: 08/12/21  7:16 AM   Result Value Ref Range    Glucose (POC) 70 65 - 117 mg/dL    Performed by Marilee Puri(PCT)      [unfilled]      Review of Systems    Limited due to unresponsive state of patient      Physical Exam:      Constitutional: pt is non-responsive. HENT:   Head: Normocephalic and atraumatic. Eyes: Pupils are equal, round, and reactive to light. EOM are normal.   Cardiovascular: Normal rate, regular rhythm and normal heart sounds. Pulmonary/Chest: Breath sounds normal. No wheezes. No rales. Exhibits no tenderness. Abdominal: Soft. Bowel sounds are normal. There is no abdominal tenderness. There is no rebound and no guarding. Musculoskeletal: Normal range of motion.    Neurological: pt is non-responsive and posturing (decerebrate)     XR CHEST PORT   Final Result      XR CHEST PORT   Final Result      XR CHEST PORT   Final Result   No significant interval change. XR CHEST PORT   Final Result   No significant change. XR CHEST PORT   Final Result   FINDINGS/IMPRESSION:   Support lines and tubes are appropriate in radiographic position. Persistent airspace disease throughout the left lung, likely pneumonia. Cardiac   silhouette stable in size. Hemodynamic status stable. XR CHEST PORT   Final Result      DUPLEX CAROTID BILATERAL   Final Result      XR CHEST PORT   Final Result      XR CHEST PORT   Final Result   ET tube placed, with tip 7 mm above the doretha. Asymmetric airspace   disease noted throughout the left lung, consistent with pneumonia, improved from   previous. Right lung clear. Stable normal heart size. Calcified aortic knob. XR CHEST PORT   Final Result   New diffuse airspace opacity in the left lung. MRI BRAIN WO CONT   Final Result   1. Restricted diffusion involving the cerebellar hemispheres, nemesio, and   possibly the left midbrain most likely representing acute ischemic infarcts   although the brainstem involvement may be partially artifactual. Consider a   short-term interval follow-up MRI. 2.  Attenuated vertebrobasilar flow voids which may be related to underlying   stenosis. CTA or MRA is recommended for further evaluation. 3.  Extensive multifocal encephalomalacia as described above. Report called to the patient's ICU nurse, Home Solano at 5:45 PM on   8/4/2021 via phone conversation. XR CHEST PORT   Final Result   The cardiomediastinal silhouette is appropriate for age, technique,   and lung expansion. Pulmonary vasculature is not congested. The lungs are   essentially clear. No effusion or pneumothorax is seen. CT CODE NEURO HEAD WO CONTRAST   Final Result      No acute intracranial bleed. Bilateral areas of encephalomalacia. Atherosclerosis.  A noncontrast head CT does not exclude the possibility of an   acute ischemic stroke. MRI is more sensitive for evaluation of brain parenchyma. Clinical correlation is recommended. Follow-up as clinically indicated. The   results were called and verbally communicated to Dr. Eric Caballero on 8/4/2021 at   1:00 AM.      Dental disease. Please see full report.            Recent Results (from the past 24 hour(s))   GLUCOSE, POC    Collection Time: 08/12/21  7:16 AM   Result Value Ref Range    Glucose (POC) 70 65 - 117 mg/dL    Performed by Juliana Puri(PCT)        Results     Procedure Component Value Units Date/Time    CULTURE, BLOOD, LINE [[id number]] Collected: 08/05/21 0102    Order Status: Completed Specimen: Blood Updated: 08/12/21 0739     Special Requests: No Special Requests        Culture result: No growth 6 days       CULTURE, RESPIRATORY/SPUTUM/BRONCH Marget Flicker STAIN [[id number]] Collected: 08/04/21 2230    Order Status: Completed Specimen: Sputum Updated: 08/07/21 1134     Special Requests: No Special Requests        GRAM STAIN Rare WBCs seen         Rare EPITHELIAL CELLS               Occasional Gram Positive Cocci in clusters                  Occasional Gram Negative Rods           Culture result:       Light Normal respiratory jesus          CULTURE, URINE [[id number]] Collected: 08/04/21 1841    Order Status: Completed Specimen: Urine Updated: 08/06/21 0729     Special Requests: No Special Requests        Culture result: No Growth (<1000 cfu/mL)       MRSA SCREEN - PCR (NASAL) [[id number]] Collected: 08/04/21 1841    Order Status: Completed Specimen: Swab Updated: 08/05/21 0027     MRSA by PCR, Nasal Not Detected       CULTURE, URINE [[id number]] Collected: 08/04/21 1100    Order Status: Completed Specimen: Urine Updated: 08/05/21 1416     Special Requests: No Special Requests        Culture result:       No significant growth, <10,000 CFU/mL          CULTURE, BLOOD #2 [[id number]] Collected: 08/04/21 1025    Order Status: Completed Specimen: Blood Updated: 08/10/21 0839     Special Requests: No Special Requests        Culture result: No growth 6 days       CULTURE, BLOOD #1 [[id number]] Collected: 08/04/21 1020    Order Status: Completed Specimen: Blood Updated: 08/10/21 0839     Special Requests: No Special Requests        Culture result: No growth 6 days              Labs:     Recent Labs     08/11/21  0440 08/10/21  0455   WBC 22.6* 19.0*   HGB 13.0 11.4*   HCT 39.0 34.2*    155     Recent Labs     08/11/21  0440 08/10/21  0455 08/09/21  1050     145 142  139 142   K 3.9  3.9 3.8  4.2 4.2   *  111* 111*  109* 112*   CO2 29  30 27  26 27   BUN 21*  21* 19  20 20   CREA 0.68*  0.69* 0.59*  0.54* 0.66*   *  108* 129*  120* 115*   CA 8.3*  8.4* 8.0*  8.2* 7.8*   MG  --   --  2.4   PHOS 3.6 2.9 3.0     Recent Labs     08/11/21  0440 08/10/21  0455 08/09/21  1050   * 377* 433*   AP 94 102 105   TBILI 0.6 0.5 0.6   TP 6.4 5.9* 5.8*   ALB 2.2*  2.2* 2.0*  1.9* 1.9*  1.9*   GLOB 4.2* 3.9 3.9     No results for input(s): INR, PTP, APTT, INREXT in the last 72 hours. No results for input(s): FE, TIBC, PSAT, FERR in the last 72 hours. No results found for: FOL, RBCF   Recent Labs     08/10/21  0451   PH 7.51*   PCO2 34*   PO2 79     No results for input(s): CPK, CKNDX, TROIQ in the last 72 hours.     No lab exists for component: CPKMB  No results found for: CHOL, CHOLX, CHLST, CHOLV, HDL, HDLP, LDL, LDLC, DLDLP, TGLX, TRIGL, TRIGP, CHHD, CHHDX  Lab Results   Component Value Date/Time    Glucose (POC) 70 08/12/2021 07:16 AM    Glucose (POC) 109 08/10/2021 09:43 PM    Glucose (POC) 102 08/10/2021 06:37 AM    Glucose (POC) 117 08/09/2021 11:41 PM    Glucose (POC) 130 (H) 08/08/2021 08:41 PM     Lab Results   Component Value Date/Time    Color Yellow/Straw 08/04/2021 06:41 PM    Appearance Clear 08/04/2021 06:41 PM    Specific gravity 1.026 08/04/2021 06:41 PM    pH (UA) 6.0 08/04/2021 06:41 PM    Protein 30 (A) 08/04/2021 06:41 PM    Glucose 50 (A) 08/04/2021 06:41 PM    Ketone 5 (A) 08/04/2021 06:41 PM    Bilirubin Negative 08/04/2021 06:41 PM    Urobilinogen 4.0 (H) 08/04/2021 06:41 PM    Nitrites Negative 08/04/2021 06:41 PM    Leukocyte Esterase Negative 08/04/2021 06:41 PM    Bacteria Negative 08/04/2021 06:41 PM    WBC 0-4 08/04/2021 06:41 PM    RBC 0-5 08/04/2021 06:41 PM         Assessment:   Acute Brainstem and cerebellar infarct  Acute hypoxemic respiratory failure intubated at 50% O2, now extubated and moved to comfort care  Aspiration pneumonia  Sepsis  History of CVA  History of seizures  Acute kidney injury  Leukocytosis        Plan:     Patients family stated they want to terminate extubation and move to comfort care  Morphine 2mg every 4 hours PRN, Ativan 4mg every 2 hours PRN, Tylenol 650mg PRN for comfort care  Odansetron and Reglan for nausea   On IV Zosyn for aspiration pneumonia  On aspirin and Lipitor  Continue IV fluids       Overall prognosis poor        Current Facility-Administered Medications:     morphine injection 2 mg, 2 mg, IntraVENous, Q4H PRN, Lupis Taylor MD, 2 mg at 08/12/21 0414    LORazepam (ATIVAN) injection 2 mg, 2 mg, IntraVENous, Q2H PRN, Lupis Taylor MD, 2 mg at 08/12/21 0414    metoclopramide HCl (REGLAN) injection 10 mg, 10 mg, IntraVENous, Q4H PRN, Jeremias Taylor MD    acetaminophen (TYLENOL) tablet 650 mg, 650 mg, Oral, Q6H PRN, 650 mg at 08/06/21 2110 **OR** acetaminophen (TYLENOL) suppository 650 mg, 650 mg, Rectal, Q6H PRN, Lupis Taylor MD    polyethylene glycol (MIRALAX) packet 17 g, 17 g, Oral, DAILY PRN, Lupis Taylor MD    ondansetron (ZOFRAN ODT) tablet 4 mg, 4 mg, Oral, Q8H PRN **OR** ondansetron (ZOFRAN) injection 4 mg, 4 mg, IntraVENous, Q6H PRN, Jeremias Taylor Battiest, MD                                                                                              *ATTENTION:  This note has been created by a medical student for educational purposes only.   Please do not refer to the content of this note for clinical decision-making, billing, or other purposes. Please see attending physicians note to obtain clinical information on this patient. *

## 2021-08-12 NOTE — PROGRESS NOTES
Consult  Pulmonary, Critical Care    Name: Michelle Fiore MRN: [id number]   : 1955 Hospital: [address] [place] [address]   Date: 2021  Admission date: 2021 Hospital Day: 9       Subjective/Interval History:   Patient admitted last night with altered mental status has remained unresponsive today. Some notes mention that he had vomiting at home before admission. His admission chest x-ray was relatively clear. He did go to MRI today came back to the floor Dr. Londa Aschoff saw him at that time he had agonal respirations and anesthesia was called to intubate him. Chest x-ray shows extensive new left-sided infiltrate he is currently on the ventilator has been transferred to the ICU. Dr. Londa Aschoff asked for me to see him for vent management     remains on the ventilator mildly sedated on propofol 10 mics. He has spontaneous respirations   remains on the ventilator reinstituted propofol due to cough and discoordination with the ventilator. He has self respirations   no change on the ventilator has self respirations remains on small dose propofol to avoid coughing against the ventilator   8/10 decrease vent support this morning respirations have increased and oxygen saturation dropped   yesterday with worsening hypoxia and when NG tube feeding stopped it all resolved. NG put to suction and overnight 800 cc out. Robinul was discontinued. We will start Reglan today and clamp the NG with drainage every 4 hours   transition to comfort care  and was extubated currently on room air    Patient Active Problem List   Diagnosis Code    Altered mental status R41.82    Dysarthria R47.1    AMS (altered mental status) R41.82       IMPRESSION:   1. Acute hypoxic respiratory failure now on room air   2. Aspiration pneumonia extensive left lung and right base   3. Hypotension questionably sepsis resolved  4.  Extensive acute CVA  cerebellar and brainstem seen on MRI remains unresponsive  5. Acute kidney injury resolved last creatinine  normal  6. Elevated troponin probably acute myocardial infarct from anoxia  7. Hypophosphatemia  repleted   8. Hypocalcemia repleated  9. History seizure disorder  10. History intracranial aneurysm repair years ago  6. Chronic left hemiparesis  Body mass index is 29.17 kg/m². 12.       RECOMMENDATIONS/PLAN:   1. Now comfort care on room air. Will not follow further         Subjective/Initial History:       I was asked by Analy Reese MD to see Vipul Barry a 76 y.o.  male  in consultation for a chief complaint of acute hypoxic respiratory failure aspiration pneumonia      The patient is unable to give any meaningful history or review of systems due to patient factors. Patient PCP: None  PMH:  has a past medical history of CVA (cerebral vascular accident) ([facility] [address] and Seizure ([facility] [address] PSH:   has no past surgical history on file. FHX: family history is not on file. SHX:  reports that he has quit smoking. He has never used smokeless tobacco. He reports previous alcohol use. He reports that he does not use drugs.     Systemic review unobtainable as patient is unresponsive on the ventilator    No Known Allergies   MEDS:   Current Facility-Administered Medications   Medication    morphine injection 2 mg    LORazepam (ATIVAN) injection 2 mg    metoclopramide HCl (REGLAN) injection 10 mg    acetaminophen (TYLENOL) tablet 650 mg    Or    acetaminophen (TYLENOL) suppository 650 mg    polyethylene glycol (MIRALAX) packet 17 g    ondansetron (ZOFRAN ODT) tablet 4 mg    Or    ondansetron (ZOFRAN) injection 4 mg        Current Facility-Administered Medications:     morphine injection 2 mg, 2 mg, IntraVENous, Q4H PRN, Lupis Taylor MD, 2 mg at 08/12/21 0414    LORazepam (ATIVAN) injection 2 mg, 2 mg, IntraVENous, Q2H PRN, Lupis Taylor MD, 2 mg at 08/12/21 0414    metoclopramide HCl (REGLAN) injection 10 mg, 10 mg, IntraVENous, Q4H PRN, Parth Taylor MD    acetaminophen (TYLENOL) tablet 650 mg, 650 mg, Oral, Q6H PRN, 650 mg at 21 2110 **OR** acetaminophen (TYLENOL) suppository 650 mg, 650 mg, Rectal, Q6H PRN, Parth Taylor MD    polyethylene glycol (MIRALAX) packet 17 g, 17 g, Oral, DAILY PRN, Parth Taylor MD    ondansetron (ZOFRAN ODT) tablet 4 mg, 4 mg, Oral, Q8H PRN **OR** ondansetron (ZOFRAN) injection 4 mg, 4 mg, IntraVENous, Q6H PRN, Lupis Taylor MD      Objective:     Vital Signs: Telemetry:    normal sinus rhythm Intake/Output:   Visit Vitals  BP (!) 83/51 (BP 1 Location: Left lower arm, BP Patient Position: At rest)   Pulse (!) 110   Temp 98.3 °F (36.8 °C)   Resp 18   Ht 5' 5\" (1.651 m)   Wt 79.5 kg (175 lb 4.3 oz)   SpO2 (!) 89%   BMI 29.17 kg/m²       Temp (24hrs), Av.3 °F (37.4 °C), Min:98.3 °F (36.8 °C), Max:100.4 °F (38 °C)        O2 Device: None (Room air)           Body mass index is 29.17 kg/m². Wt Readings from Last 4 Encounters:   08/10/21 79.5 kg (175 lb 4.3 oz)          Intake/Output Summary (Last 24 hours) at 2021 0919  Last data filed at 2021 0151  Gross per 24 hour   Intake 1803.68 ml   Output 550 ml   Net 1253.68 ml       Last shift:      No intake/output data recorded. Last 3 shifts: 08/10 1901 -  0700  In: 2877.6 [I.V.:2877.6]  Out: 1350 [Urine:850]       Hemodynamics:    CO:    CI:    CVP:    SVR:   PAP Systolic:    PAP Diastolic:    PVR:    KS09:       Ventilator Settings:      Mode Rate TV Press PEEP FiO2 PIP Min. Vent   SIMV, Volume control    550 ml 15 cm H2O  7 cm H20 50 %  21 cm H2O  4.68 l/min      Physical Exam:    General:  male; unresponsive   HEENT: NCAT,   Eyes: anicteric; conjunctiva clear no doll's eye reflex eyes are disconjugate  Neck: no nodes,, no accessory MM use.   No JVD has retained secretions in the oropharynx with respiratory noise over the neck  Chest: no deformity,   Cardiac: Regular rate and rhythm  Lungs: Respirations nonlabored but does have an occasional coughing episodes from residual secretions in the oropharynx  Abd: Soft rare bowel sounds no grimacing to palpation  Ext: no edema; no joint swelling;  No clubbing  : clear urine  Neuro: Unresponsive no doll's eye reflex flaccid extremities  Psych-  unable to assess  Skin: warm, dry, no cyanosis;   Pulses: Brachial and radial pulses intact  Capillary: Normal capillary refill      Labs:    Recent Labs     08/11/21  0440 08/10/21  0455 08/09/21  1050   WBC 22.6* 19.0* 17.9*   HGB 13.0 11.4* 11.6*    155 124*     Recent Labs     08/11/21  0440 08/10/21  0455 08/09/21  1050     145 142  139 142   K 3.9  3.9 3.8  4.2 4.2   *  111* 111*  109* 112*   CO2 29  30 27  26 27   *  108* 129*  120* 115*   BUN 21*  21* 19  20 20   CREA 0.68*  0.69* 0.59*  0.54* 0.66*   CA 8.3*  8.4* 8.0*  8.2* 7.8*   MG  --   --  2.4   PHOS 3.6 2.9 3.0   ALB 2.2*  2.2* 2.0*  1.9* 1.9*  1.9*   * 377* 433*           Lab Results   Component Value Date/Time    Culture result: No growth 6 days 08/05/2021 01:02 AM    Culture result: Light Normal respiratory jesus 08/04/2021 10:30 PM    Culture result: No Growth (<1000 cfu/mL) 08/04/2021 06:41 PM   Urine no growth  Sputum rare polys with normal jesus  Blood no growth  Nasal MRSA smear negative    Imaging:  I have personally reviewed the patients radiographs and have reviewed the reports:    CXR Results  (Last 48 hours)  8/11 ET and NG tube in place right base has cleared left extensive infiltrate may be slightly less dense  8/10 ET and NG tube remain in place extensive left lung infiltrate slightly less dense the left bases improved right medial base unchanged  8/7 chest x-ray ET and NG tube in place extensive left infiltrate with left base better inflation slowly clearing  8/6 chest x-ray ET NG tube in place extensive left infiltrate mildly improved  8/4 chest x-ray after intubation shows ET tube a little low left lung reinflated extensive infiltrate persist  8/4 radiology has called MRI or results as acute cerebellar and brainstem infarct               08/04/21 1710  XR CHEST PORT Final result    Impression:  New diffuse airspace opacity in the left lung. Narrative:  Chest, frontal view, 8/4/2021       History: Shortness of breath. Comparison: Chest, same day. Findings: The cardiac silhouette is stable. The right lung is adequately   expanded and clear. The left lung has diffuse airspace opacity, new as compared   to study performed earlier the same day. Differential considerations include   aspiration, collapse secondary to mucous plugging. Clinical correlation is   recommended. Left pleural effusion is not excluded. No pneumothorax is   identified. Degenerative changes are present in the thoracic spine. 08/04/21 0853  XR CHEST PORT Final result    Impression:  The cardiomediastinal silhouette is appropriate for age, technique,   and lung expansion. Pulmonary vasculature is not congested. The lungs are   essentially clear. No effusion or pneumothorax is seen. Narrative:  1 view               Results from Hospital Encounter encounter on 08/04/21    XR CHEST PORT    Narrative  Chest single view. Comparison single view chest August 10, 2021. Stable ET tube and NG tube. Persistent hazy opacity left lung. Cardiac and  mediastinal structures unchanged. No pneumothorax or sizable pleural effusion. XR CHEST PORT    Narrative  Chest single view. Comparison single view chest 8/9/2021. Stable ET tube and NG tube. Hazy opacity throughout the entire left lung and right lower lobe lung, similar  distribution compared to prior imaging. Cardiac and mediastinal structures  unchanged. No pneumothorax or sizable pleural effusion. XR CHEST PORT    Narrative  Chest, 2 frontal views, 8/9/2021    History: Aspiration pneumonia.     Comparison: Including chest 8/8/2021. Findings: The chest is obscured by artifact. Endotracheal tube tip is 4.3 cm  from the doretha. Feeding tube tip is at the stomach. The cardiac silhouette is  stable. Lung volumes are not significantly changed. Airspace opacities  throughout the left lung and right base are not significantly changed. No  pleural effusion or pneumothorax identified. The osseous structures are stable. Impression  No significant interval change. Results from [place] encounter on 08/04/21    CT CODE NEURO HEAD WO CONTRAST    Narrative  CT head without contrast    Dose reduction: All CT scans at this facility are performed using dose reduction  optimization techniques as appropriate to a performed exam including the  following: Automated exposure control, adjustments of the mA and/or kV according  to patient size, or use of iterative reconstruction technique. INDICATION: Code neuro    FINDINGS:    No acute intracranial bleed or midline shift. Areas of encephalomalacia in the  right hemisphere and left frontal lobe. Probable white matter small vessel  ischemic changes. Mild ex vacuo dilatation of right lateral ventricle. MRI is  more sensitive for evaluation of brain parenchyma. Atherosclerosis. No acute  skull fracture. Postoperative changes right skull. No fluid in the paranasal  sinuses or mastoid air cells. Caries and periapical infection/abscess involving  multiple teeth. Impression  No acute intracranial bleed. Bilateral areas of encephalomalacia. Atherosclerosis. A noncontrast head CT does not exclude the possibility of an  acute ischemic stroke. MRI is more sensitive for evaluation of brain parenchyma. Clinical correlation is recommended. Follow-up as clinically indicated. The  results were called and verbally communicated to Dr. David Foster on 8/4/2021 at  1:00 AM.    Dental disease. Please see full report.     Discussion aspiration pneumonia with acute respiratory failure secondary to vomiting and brainstem and cerebellar infarct. Can add Decadron to try and prevent edema but it is not likely to help. 8/5 remains unresponsive does have some generalized movement when sternal rub. Renal function has deteriorated questionable ATN. Chest x-ray shows extensive infiltrate from aspiration. Troponin has elevated. MRI results as mentioned with new acute cerebellar infarcts and probable brainstem as well recovery seems unlikely  8/6 remains unresponsive on the ventilator on small dose of propofol to avoid cough on ventilator. He does have spontaneous respirations. Sputum does show gram negative zeke a few gram-positive cocci nasal MRSA smear is negative we will maintain Zosyn  8/7 remains unresponsive on the ventilator. Propofol reintroduced at low rate as he was bucking the ventilator. No specific pathogen on sputum culture likely aspiration we will continue Zosyn phosphorus is low will replete  8/8 unchanged remains on the ventilator unresponsive continue propofol to avoid coughing against the ventilator. Phosphorus remains low. Creatinine normal will decrease IV fluids  8/9 unresponsive on the ventilator receiving low-dose propofol to prevent coughing against the ventilator has profound posturing when he is stimulated otherwise unresponsive. Chest x-ray still shows extensive infiltrate  8/10 no change unresponsive on the ventilator tachypnea. Chest x-ray slowly improving but extensive infiltrate remains  8/11 worsening hypoxia yesterday and ventilator support increased. Improved with discontinuation of tube feedings. He was on Robinul very likely had ileus with some reflux. Robinul discontinued. 8/12 now on comfort care will not follow further           Thank you for allowing us to participate in the care of this patient.   We will follow along with you    Nataly Osei MD

## 2021-08-13 NOTE — HOSPICE
[address] to Those in Need  [phone]    Patient Name: Justen Dunn  YOB: 1955  Age: 76 y.o. [facility] RN Note:  Hospice consult noted. Chart reviewed. Plan of care discussed with patients nurse & care manager. In to meet with son. Discussed Hospice philosophy, general plan of care, levels of care, services and on call procedures. Family information packet provided & reviewed with son , Ashwin Shannon. Son has agreed to have him sent to [facility] today for end of life care. Consents completed along with DDNR and scanned. Spoke with Sari Lynch CM and she is setting transport for 12 noon. Reviewed visitation with son.  home is [place] , [phone]. Patient is an [employer] vet. Thank you for the opportunity to be of service to this patient.     Deleta Oppenheim RN  [phone] c  [phone] o

## 2021-08-13 NOTE — PROGRESS NOTES
[facility] Discharge orders verified as well as discharge summary. Family is aware as they had meeting this morning. All lines remained intact per request of [facility]. Report/update called in to Jabari @ [phone] with opportunity for questions. Esperanza Garces transported patient at [phone] without incident; receiving facility aware patient is in route.

## 2021-08-13 NOTE — MED STUDENT NOTES
General Daily Progress Note          Patient Name:   Vipul Barry       YOB: 1955       Age:  76 y.o. Admit Date: 8/4/2021      Subjective:     Patient admitted six days ago with altered mental status has remained unresponsive.  Patient's son reports that the patient's baseline, he is walking and talking.  A follow-up MRI revealed acute infarction of cerebellar hemispheres, nemesio and midbrian.  Chest x-ray since admission shows extensive new left-sided infiltrate, he formerly was on the ventilator.      A lengthy discussion was had with the patient's family 3 days ago, and the family stated the wishes to make the patient DNR and move to comfort care. At 1215, 2 days ago the patient was extubated. At this time the patient was transitioned to comfort care. Case management is working with the family to find a facility that can provide hospice care for the patient, as per family they would prefer the patient to receive hospice care in a facility. Patient is a , but the [facility] was not able to accept the patient to their hospice unit. Case management is exploring options of applying for Medicaid for the patient to receive hospice care in a facility. Objective:     Visit Vitals  /69 (BP 1 Location: Left lower arm, BP Patient Position: At rest)   Pulse (!) 120   Temp 99.9 °F (37.7 °C)   Resp 24   Ht 5' 5\" (1.651 m)   Wt 175 lb 4.3 oz (79.5 kg)   SpO2 90%   BMI 29.17 kg/m²        No results found for this or any previous visit (from the past 24 hour(s)). [unfilled]      Review of Systems    Limited due to unresponsive state of patient       Physical Exam:      Constitutional: pt is unresponsive   HENT:   Head: Normocephalic and atraumatic. Eyes: Pupils are equal, round, and reactive to light. EOM are normal.   Cardiovascular: Normal rate, regular rhythm and normal heart sounds. Pulmonary/Chest: Breath sounds normal. No wheezes. No rales. Exhibits no tenderness. Abdominal: Soft. Bowel sounds are normal. There is no abdominal tenderness. There is no rebound and no guarding. Musculoskeletal: Posturing (decerebrate). Neurological: pt is unresponsive. XR CHEST PORT   Final Result      XR CHEST PORT   Final Result      XR CHEST PORT   Final Result   No significant interval change. XR CHEST PORT   Final Result   No significant change. XR CHEST PORT   Final Result   FINDINGS/IMPRESSION:   Support lines and tubes are appropriate in radiographic position. Persistent airspace disease throughout the left lung, likely pneumonia. Cardiac   silhouette stable in size. Hemodynamic status stable. XR CHEST PORT   Final Result      DUPLEX CAROTID BILATERAL   Final Result      XR CHEST PORT   Final Result      XR CHEST PORT   Final Result   ET tube placed, with tip 7 mm above the doretha. Asymmetric airspace   disease noted throughout the left lung, consistent with pneumonia, improved from   previous. Right lung clear. Stable normal heart size. Calcified aortic knob. XR CHEST PORT   Final Result   New diffuse airspace opacity in the left lung. MRI BRAIN WO CONT   Final Result   1. Restricted diffusion involving the cerebellar hemispheres, nemesio, and   possibly the left midbrain most likely representing acute ischemic infarcts   although the brainstem involvement may be partially artifactual. Consider a   short-term interval follow-up MRI. 2.  Attenuated vertebrobasilar flow voids which may be related to underlying   stenosis. CTA or MRA is recommended for further evaluation. 3.  Extensive multifocal encephalomalacia as described above. Report called to the patient's ICU nurse, Ruddy Walter at 5:45 PM on   8/4/2021 via phone conversation. XR CHEST PORT   Final Result   The cardiomediastinal silhouette is appropriate for age, technique,   and lung expansion. Pulmonary vasculature is not congested.  The lungs are   essentially clear. No effusion or pneumothorax is seen. CT CODE NEURO HEAD WO CONTRAST   Final Result      No acute intracranial bleed. Bilateral areas of encephalomalacia. Atherosclerosis. A noncontrast head CT does not exclude the possibility of an   acute ischemic stroke. MRI is more sensitive for evaluation of brain parenchyma. Clinical correlation is recommended. Follow-up as clinically indicated. The   results were called and verbally communicated to Dr. Shazia Delvalle on 8/4/2021 at   1:00 AM.      Dental disease. Please see full report. No results found for this or any previous visit (from the past 24 hour(s)).     Results     Procedure Component Value Units Date/Time    CULTURE, BLOOD, LINE [[id number]] Collected: 08/05/21 0102    Order Status: Completed Specimen: Blood Updated: 08/12/21 0739     Special Requests: No Special Requests        Culture result: No growth 6 days       CULTURE, RESPIRATORY/SPUTUM/BRONCH Alexandre Burns STAIN [[id number]] Collected: 08/04/21 2230    Order Status: Completed Specimen: Sputum Updated: 08/07/21 1134     Special Requests: No Special Requests        GRAM STAIN Rare WBCs seen         Rare EPITHELIAL CELLS               Occasional Gram Positive Cocci in clusters                  Occasional Gram Negative Rods           Culture result:       Light Normal respiratory jesus          CULTURE, URINE [[id number]] Collected: 08/04/21 1841    Order Status: Completed Specimen: Urine Updated: 08/06/21 0729     Special Requests: No Special Requests        Culture result: No Growth (<1000 cfu/mL)       MRSA SCREEN - PCR (NASAL) [[id number]] Collected: 08/04/21 1841    Order Status: Completed Specimen: Swab Updated: 08/05/21 0027     MRSA by PCR, Nasal Not Detected       CULTURE, URINE [[id number]] Collected: 08/04/21 1100    Order Status: Completed Specimen: Urine Updated: 08/05/21 1416     Special Requests: No Special Requests        Culture result:       No significant growth, <10,000 CFU/mL CULTURE, BLOOD #2 [[id number]] Collected: 08/04/21 1025    Order Status: Completed Specimen: Blood Updated: 08/10/21 0839     Special Requests: No Special Requests        Culture result: No growth 6 days       CULTURE, BLOOD #1 [[id number]] Collected: 08/04/21 1020    Order Status: Completed Specimen: Blood Updated: 08/10/21 0839     Special Requests: No Special Requests        Culture result: No growth 6 days              Labs:     Recent Labs     08/11/21 0440   WBC 22.6*   HGB 13.0   HCT 39.0        Recent Labs     08/11/21 0440     145   K 3.9  3.9   *  111*   CO2 29  30   BUN 21*  21*   CREA 0.68*  0.69*   *  108*   CA 8.3*  8.4*   PHOS 3.6     Recent Labs     08/11/21 0440   *   AP 94   TBILI 0.6   TP 6.4   ALB 2.2*  2.2*   GLOB 4.2*     No results for input(s): INR, PTP, APTT, INREXT in the last 72 hours. No results for input(s): FE, TIBC, PSAT, FERR in the last 72 hours. No results found for: FOL, RBCF   No results for input(s): PH, PCO2, PO2 in the last 72 hours. No results for input(s): CPK, CKNDX, TROIQ in the last 72 hours.     No lab exists for component: CPKMB  No results found for: CHOL, CHOLX, CHLST, CHOLV, HDL, HDLP, LDL, LDLC, DLDLP, TGLX, TRIGL, TRIGP, CHHD, CHHDX  Lab Results   Component Value Date/Time    Glucose (POC) 70 08/12/2021 07:16 AM    Glucose (POC) 109 08/10/2021 09:43 PM    Glucose (POC) 102 08/10/2021 06:37 AM    Glucose (POC) 117 08/09/2021 11:41 PM    Glucose (POC) 130 (H) 08/08/2021 08:41 PM     Lab Results   Component Value Date/Time    Color Yellow/Straw 08/04/2021 06:41 PM    Appearance Clear 08/04/2021 06:41 PM    Specific gravity 1.026 08/04/2021 06:41 PM    pH (UA) 6.0 08/04/2021 06:41 PM    Protein 30 (A) 08/04/2021 06:41 PM    Glucose 50 (A) 08/04/2021 06:41 PM    Ketone 5 (A) 08/04/2021 06:41 PM    Bilirubin Negative 08/04/2021 06:41 PM    Urobilinogen 4.0 (H) 08/04/2021 06:41 PM    Nitrites Negative 08/04/2021 06:41 PM Leukocyte Esterase Negative 08/04/2021 06:41 PM    Bacteria Negative 08/04/2021 06:41 PM    WBC 0-4 08/04/2021 06:41 PM    RBC 0-5 08/04/2021 06:41 PM         Assessment:   Acute Brainstem and cerebellar infarct  Acute hypoxemic respiratory failure, extubated and moved to comfort care  Aspiration pneumonia  Sepsis  History of CVA  History of seizures  Acute kidney injury  Leukocytosis        Plan:   Patients family stated they want to terminate extubation and move to comfort care  Morphine 2mg every 4 hours PRN, Ativan 4mg every 2 hours PRN,     Case management is working on finding a hospice care facility for the patient.     Overall prognosis poor          Current Facility-Administered Medications:     LORazepam (ATIVAN) injection 2 mg, 2 mg, IntraVENous, Q2H, Lupis Taylor MD, 2 mg at 08/13/21 0702    morphine injection 2 mg, 2 mg, IntraVENous, Q2H PRN, Kristie Dawn MD, 2 mg at 08/12/21 2340    LORazepam (ATIVAN) injection 2 mg, 2 mg, IntraVENous, Q2H PRN, Kristie Dawn MD, 2 mg at 08/12/21 0414    metoclopramide HCl (REGLAN) injection 10 mg, 10 mg, IntraVENous, Q4H PRN, Isai Taylor MD    acetaminophen (TYLENOL) tablet 650 mg, 650 mg, Oral, Q6H PRN, 650 mg at 08/06/21 2110 **OR** acetaminophen (TYLENOL) suppository 650 mg, 650 mg, Rectal, Q6H PRN, Lupis Taylor MD    polyethylene glycol (MIRALAX) packet 17 g, 17 g, Oral, DAILY PRN, Lupis Taylor MD    ondansetron (ZOFRAN ODT) tablet 4 mg, 4 mg, Oral, Q8H PRN **OR** ondansetron (ZOFRAN) injection 4 mg, 4 mg, IntraVENous, Q6H PRN, Isai Taylor MD                                                                                              *ATTENTION:  This note has been created by a medical student for educational purposes only. Please do not refer to the content of this note for clinical decision-making, billing, or other purposes. Please see attending physicians note to obtain clinical information on this patient. *

## 2021-08-13 NOTE — PROGRESS NOTES
DC Plan: [address] spoke with MARIETTA TODD RN this morning. There is a meeting with family scheduled for 10am.     __________________________________________________________    Andrez spoke with MARIETTA TODD RN again. Transportation to be set up for noon to the hospice house in [facility]. Family is aware of dc plan. Cm spoke with Dr. Earline Salcido and notified him of discharge to the hospice house in [facility] today. Pt's nurse is aware of plan as well.

## 2021-08-13 NOTE — HOSPICE
[facility]  [phone]    Social Work Admission Note  Patient Name: Michelle Fiore  YOB: 1955  Age: 76 y.o. Date of Visit: 21  Facility of Care: [facility]  Patient Room:      Hospice Attending: Ryan Shin MD  Hospice Diagnosis: CVA     Level of Care:    [x]  GIP    []  Respite   []  Routine    NARRATIVE   LCSW visited with patient at bedside shortly after his arrival. He was received lying in hospital bed, appeared comfortable but was not responsive to sound or touch. Patient does appear to be transitioning. No family at bedside at time of visit, however RN Liaison at the hospital was able to meet with patient's son and explain hospice philosophy and complete all necessary paperwork. LCSW will plan to be available for support moving forward. ADVANCE CARE PLANNING    Code Status: DNR  Durable DNR: X Yes  _ No  Advance Care Planning 2021   Confirm Advance Directive None   Patient Would Like to Complete Advance Directive Unable       Relationship Status:  []  Single     []        []      []  Domestic Partner     []  /  []  Common Law  []    [x]  Unknown    If in a relationship, name of partner/spouse:  Duration of relationship:    Jewish: NO PREFERENCE     Home: [facility] [phone]  Resources Provided: Calming presence at bedside      Social Work Initial Assessment     Gender:  male    Race/Ethnicity: (john all that apply)  []  American [place] [facility] or [place] Native  []    [x]  Black or [place] American  []   or   []  [address] [place] [address] or [facility]  []  Shraddha Winters  []  Unknown      Service:    [x]  Yes   []  No       []  Unknown  Appropriate for Pinning Ceremony:   []  Yes      [x]  No  Is patient using VA benefits?    []  Yes      []  No     Primary Language: English  []   Needed  []   utilized during visit    Ability to express thoughts/needs/feelings  []  Expressed thoughts/feelings/needs without difficulty  []  Requires extra time and cuing  []  Speech limited single words  []  Uses only gestures (eye, blinking eye or head movement/pointing)  []  Unable to express thoughts/feelings/needs (speech unintelligible or inappropriate)  [x]  Unresponsive  Notes:      Mental Status:  []  Alert-oriented to:     []  Person     []  Place     []  Time  []  Comatose-responds to:    []   Verbal stimuli    []  Tactile stimuli    []  Painful stimuli  []  Forgetful  []  Disoriented/Confused  [x]  Lethargic  []  Agitated  []  Other (specify):    Notes:      Patients description of Illness/Current Health Status:    [x]  Patient unable to discuss  []  Patient unwilling to discuss  []  (Specify)        Knowledge/Understanding of Disease Process  Patient:    []  Demonstrates knowledge/understanding of disease process   []  Demonstrates knowledge/understanding of treatment plan   []  Demonstrates knowledge/understanding of prognosis   []  Demonstrates acceptance of prognosis   []  Demonstrates knowledge/understanding of resuscitation status   []  Other (specify)  Caregiver:   []  Demonstrates knowledge/understanding of disease process   []  Demonstrates knowledge/understanding of treatment plan   []  Demonstrates knowledge/understanding of prognosis   []  Demonstrates acceptance of prognosis   []  Demonstrates knowledge/understanding of resuscitation status   []  Other (specify)  Notes:      Patients living arrangement/care setting:  Use the PRIOR COLUMN when the PATIENTS current health status necessitated a change in his/her primary residence.     Prior Current Response              []             []    Patients own home/residence              []             []    Home of family member/friend              []             []    Boarding home              []             []    Assisted living facility/half-way center              []             [] Hospital/Acute care facility              []             []    Skilled nursing facility              []             []    Long term care facility/Nursing home              []             [x]    Hospice in Patient      Primary Caregiver:  []  No Primary Caregiver  Name of Primary Caregiver: Kale Vera. [phone]  Relationship or Primary Caregiver:    []  Spouse/Significant other       [x]  Natural Child        []  Step child       []  Sibling   []  Parent   []  Friend/Neighbor   []  Community/Holiness Volunteer   []  Paid help   []  Other (specify):___________  Notes:       Family members/Significant others:  Name:  Relationship:  Phone Number:  Actively involved in care? []  Yes  []  No    Name:  Relationship:  Phone Number:  Actively involved in care? []  Yes  []  No    Name:  Relationship:  Phone Number:  Actively involved in care?   []  Yes  []  No    Social support systems: (select ONE best description)  []  Excellent social support system which includes three or more family members or friends  []  Good social support system which includes two or less members or friends  [x]  [address] support which includes one family member or friend  []  Poor social support; no family members or friends; basically ALONE  Notes:      Emotional Status: (john all that apply)    Patient Caregiver Response                 []                []    Mood/Affect stable and appropriate                   []                []    Angry                 []                []    Anxious                 []                []    Apprehensive                 []                []    Avoidant                 []                []    Clinging                 []                []    Depressed                 []                []    Distraught                 []                []    Elated                 []                []    Euphoric                 []                []    Fearful                 []                []    Flat Affect []                []    Helpless                 []                []    Hostile                 []                []    Impulsive                 []                []    Irritable                 []                []    Labile                 []                []    Manic                 []                []    Restlessness                 []                []    Sad                 []                []    Suspicious                 []                []    Tearful                 []                []    Withdrawn     Notes:     Coping Skills (strengths/weakness):    Patient: Coping Skills (strength/weakness):    Family/caregiver (strength/weakness):     [place] of care (john all that apply):     [x]  No burden evident   []  Family must administer medications   []  Illness causing financial strain   []  Family/Support feels overwhelmed   []  Family/Support sleep disturbed with patients care   []  Patients care causes extra physical stress  of death  []  Illness causes changes in family lifestyle  []  Illness impacting family/support employment  []  Family experiencing increased time demands  []  Patients behavior endangers family  []  Denial of patients illness  []  Concern over outcome of illness/fear  []  Patients behavior embarrassing to family   Notes:      Risk Factors: (john all that apply):    [x]  No burden evident   []  Alcohol abuse  []  Financial resources inadequate to meet basic needs (food/house/etc)  []  Financial resources inadequate to meet health care needs (supplies/equipment/medications)  []  Food/nutrition resources inadequate  []  Home environment unsafe/inadequate for home care  []  Homicidal risk  []  Lives alone or without concerned relatives  []  Multiple medications/complex schedule  []  Physical limitations increase likelihood of falls  []  Plan of care/treatments complicated  []  Substance use/abuse  []  Suicidal risk  []  Visual impairment threatens safety/ability to perform self-care  []  Other (specify):     Abuse/Neglect (actual/potential risks):  []  No signs of abuse/neglect  []  History of abuse/neglect                 []  [id number]          []  Sexual  []  History of domestic violence  []  Lacks adequate physical care  []  Lacks emotional nurturing/support  []  Lacks appropriate stimulation/cognitive experiences  []  Left alone inappropriately  []  Lacks necessary supervision  []  Inadequate or delayed medical care  []  Unsafe environment (i.e guns/drug use/history of violence in the home/etc.)  []  Bruising or other physical signs of injury present  []  Other (specify):  Notes:   []  Refer to child/adult protective services      Current Sources of Stress (in Addition to Current Illness):   [x]  None reported  []  Bills/Debt    []  Career/Job change    []   (short term)    []   (long term)    []  Death of a child (recent)    []  Death of a parent (recent)   []  Death of a spouse (recent)   []  Employment status changed   []  Family discord    []  Financial loss/Inadequate inther (specify):come  []  Job loss  []  Legal issues unresolved  []  Lifestyle change  []  Marital discord  []  Marriage within the last year  []  Paperwork (insurance/legal/etc) overwhelming  []  Separation/Divorce  []  Other (specify):  Notes:      Current [employer] Being Utilized     1. [facility] Care        Interventions/Plan of Care     1. Assess social and emotional factors related to coping with end of life issues  2. Community resource planning/referral   3. Relocation to different care setting if/when symptoms stabilize  4. Discharge Planning     1.  Should pt stabilize will dc home vs. facility    MSW Assessment Completed by: Jaret Guevara  08/13/21    Time In: 240       Time Out:305

## 2021-08-13 NOTE — DISCHARGE SUMMARY
Discharge Summary       PATIENT ID: Ny Monge  MRN: [id number]   YOB: 1955    DATE OF ADMISSION: 8/4/2021 12:27 AM    DATE OF DISCHARGE:   PRIMARY CARE PROVIDER: None     ATTENDING PHYSICIAN: Nelsy Taylor  DISCHARGING PROVIDER: Nelsy Taylor      CONSULTATIONS: IP CONSULT TO HOSPITALIST  IP CONSULT TO NEUROLOGY  IP CONSULT TO CARDIOLOGY    PROCEDURES/SURGERIES: * No surgery found *    ADMITTING DIAGNOSES:    Patient Active Problem List    Diagnosis Date Noted    Altered mental status 08/04/2021    Dysarthria 08/04/2021    AMS (altered mental status) 08/04/2021       DISCHARGE DIAGNOSES / PLAN:      Acute Brainstem and cerebellar infarct  Acute hypoxemic respiratory failure, extubated and moved to comfort care  Aspiration pneumonia  Sepsis  History of CVA  History of seizures  Acute kidney injury  Leukocytosis        DISCHARGE MEDICATIONS:  There are no discharge medications for this patient. NOTIFY YOUR PHYSICIAN FOR ANY OF THE FOLLOWING:   Fever over 101 degrees for 24 hours. Chest pain, shortness of breath, fever, chills, nausea, vomiting, diarrhea, change in mentation, falling, weakness, bleeding. Severe pain or pain not relieved by medications. Or, any other signs or symptoms that you may have questions about. DISPOSITION:  x  Home With:   OT  PT  HH  RN       Long term SNF/Inpatient Rehab    Independent/assisted living    Hospice    Other:       PATIENT CONDITION AT DISCHARGE: Stable      PHYSICAL EXAMINATION AT DISCHARGE:  General:          Alert, cooperative, no distress, appears stated age. HEENT:           Atraumatic, anicteric sclerae, pink conjunctivae                          No oral ulcers, mucosa moist, throat clear, dentition fair  Neck:               Supple, symmetrical  Lungs:             Clear to auscultation bilaterally. No Wheezing or Rhonchi. No rales. Chest wall:      No tenderness  No Accessory muscle use.   Heart:              Regular  rhythm, No  murmur   No edema  Abdomen:        Soft, non-tender. Not distended. Bowel sounds normal  Extremities:     No cyanosis. No clubbing,                            Skin turgor normal, Capillary refill normal  Skin:                Not pale. Not Jaundiced  No rashes   Psych:             Not anxious or agitated. Neurologic:      Alert, moves all extremities, answers questions appropriately and responds to commands     XR CHEST PORT   Final Result      XR CHEST PORT   Final Result      XR CHEST PORT   Final Result   No significant interval change. XR CHEST PORT   Final Result   No significant change. XR CHEST PORT   Final Result   FINDINGS/IMPRESSION:   Support lines and tubes are appropriate in radiographic position. Persistent airspace disease throughout the left lung, likely pneumonia. Cardiac   silhouette stable in size. Hemodynamic status stable. XR CHEST PORT   Final Result      DUPLEX CAROTID BILATERAL   Final Result      XR CHEST PORT   Final Result      XR CHEST PORT   Final Result   ET tube placed, with tip 7 mm above the doretha. Asymmetric airspace   disease noted throughout the left lung, consistent with pneumonia, improved from   previous. Right lung clear. Stable normal heart size. Calcified aortic knob. XR CHEST PORT   Final Result   New diffuse airspace opacity in the left lung. MRI BRAIN WO CONT   Final Result   1. Restricted diffusion involving the cerebellar hemispheres, nemesio, and   possibly the left midbrain most likely representing acute ischemic infarcts   although the brainstem involvement may be partially artifactual. Consider a   short-term interval follow-up MRI. 2.  Attenuated vertebrobasilar flow voids which may be related to underlying   stenosis. CTA or MRA is recommended for further evaluation. 3.  Extensive multifocal encephalomalacia as described above.          Report called to the patient's ICU nurse, Marolyn Lambert Dr. Merlinda Roan at 5:45 PM on 8/4/2021 via phone conversation. XR CHEST PORT   Final Result   The cardiomediastinal silhouette is appropriate for age, technique,   and lung expansion. Pulmonary vasculature is not congested. The lungs are   essentially clear. No effusion or pneumothorax is seen. CT CODE NEURO HEAD WO CONTRAST   Final Result      No acute intracranial bleed. Bilateral areas of encephalomalacia. Atherosclerosis. A noncontrast head CT does not exclude the possibility of an   acute ischemic stroke. MRI is more sensitive for evaluation of brain parenchyma. Clinical correlation is recommended. Follow-up as clinically indicated. The   results were called and verbally communicated to Dr. Leonidas Colin on 8/4/2021 at   1:00 AM.      Dental disease. Please see full report. No results found for this or any previous visit (from the past 24 hour(s)). HOSPITAL COURSE:    Patient admitted six days ago with altered mental status has remained unresponsive.  Patient's son reports that the patient's baseline, he is walking and talking.  A follow-up MRI revealed acute infarction of cerebellar hemispheres, nemesio and midbrian.  Chest x-ray since admission shows extensive new left-sided infiltrate, he formerly was on the ventilator.      A lengthy discussion was had with the patient's family 3 days ago, and the family stated the wishes to make the patient DNR and move to comfort care. At 1215, 2 days ago the patient was extubated. At this time the patient was transitioned to comfort care.        Patient resting comfortably on morphine and Ativan patient going to hospice house in [address] [facility] and hospice care      Signed:   Raza Pineda MD  8/13/2021  11:55 AM

## 2021-08-13 NOTE — HOSPICE
[facility]  [phone]    Inpatient Nursing Admission   Patient Name: Justen Dunn  YOB: 1955  Age: 76 y.o. Date of Hospice Admission: 8/13/2021  Hospice Attending Elected by Patient: Silverio Forrest MD  Primary Care Physician: None  Admitting RN: Mike Baig   : Johny Main      Level of Care (GIP/Routine/Respite): GIP  Facility of Care: [facility]  Patient Room: 14/01     [facility] SUMMARY   ER Visits/ Hospitalizations in past year: One ED admission and one inpatient admission   Hospice Diagnosis: CVA   Onset Date of Hospice Diagnosis: August 2021   Summary of Disease Progression Leading to Hospice Diagnosis: Patient is a 79-year-old male who was admitted to [facility] on 8/4/2021 with acute cerebrovascular accident. MRI revealed acute infarction of the cerebellar hemispheres, nemesio, midbrain. Chest x-ray at admission showed extensive new left-sided infiltrate. Patient had been on prior ventilatory support. Patient treated aggressively with IV antibiotics, intubation but continued to show evidence of decline with minimal responsiveness. Family is elected to transition to comfort with the support of hospice. Patient transferred to the community hospice house secondary to [facility] level care needs and significant symptom burden. Diagnoses RELATED to the terminal prognosis: CVA   Other Diagnoses:  AMS  Patient Active Problem List   Diagnosis Code    Altered mental status R41.82    Dysarthria R47.1    AMS (altered mental status) R41.82       Rationale for a prognosis of life expectancy of 6 months or less if the disease follows its normal course (Disease Specific History):     Justen Dunn is a 76 y.o. who was admitted to [facility]. The patient's principle diagnosis of CVA has resulted in labored breathing, seizures, secretions, fever.   Functionally, the patient's Palliative Performance Scale has declined over a period of days and is estimated at 10. Objective information that support this patients limited prognosis includes head CT. The patient/family chose comfort measures with the support of Hospice. Patient meets for GIP LOC as evidenced by need for scheduled and PRN IV medications   Prognosis estimated based on 08/13/21 clinical assessment is:   [] Few to Many Hours  [x] Hours to Days   [] Few to Many Days   [] Days to Weeks   [] Few to Many Weeks   [] Weeks to Months   [] Few to Many Months    ASSESSMENT    Patient self-reports:  []  Yes    [x] No    SYMPTOMS: Agitation, anxiety, pain, shortness of breath, seizures, fever, secretions     SIGNS/PHYSICAL FINDINGS:  Labored breathing, restlessness  FAST for all dementia:      Learning Assessment:  Patient  Is patient willing/able to learn? No  What is the highest level of education completed? Unknown   Learning preference (written material, demonstration, visual)? No preference   Learning barriers (ESOL, [place], poor vision)? ESOL     Caregiver  Is caregiver willing to learn care for patient? Yes  What is the highest level of education completed? Unknown   Learning preference (written material, demonstration, visual)? No preference   Learning barriers (ESOL, [place], poor vision)? None noted     CLINICAL INFORMATION     Wt Readings from Last 3 Encounters:   08/10/21 79.5 kg (175 lb 4.3 oz)     Ht Readings from Last 3 Encounters:   08/05/21 5' 5\" (1.651 m)     There is no height or weight on file to calculate BMI. Visit Vitals  Pulse 67   Temp 99.6 °F (37.6 °C)   Resp 20   SpO2 92%       LAB VALUES  No results found for this visit on 08/13/21 (from the past 12 hour(s)). No results found for this visit on 08/13/21 (from the past 6 hour(s)).   Lab Results   Component Value Date/Time    Protein, total 6.4 08/11/2021 04:40 AM    Albumin 2.2 (L) 08/11/2021 04:40 AM    Albumin 2.2 (L) 08/11/2021 04:40 AM       Currently this patient has:  [x] Supplemental O2 [x] Peripheral IV  [] PICC    [] PORT   [x] Arora Catheter [] NG Tube   [] PEG Tube [] Ostomy    [] AICD: Has ICD been deactivated? [] Yes [] No:______    PLAN     1. Admit to [facility] level of care for manegment of symptoms   2. Continue IV lorazepam 2 mg every two hours. PRN doses available. Start IV morphine 2 mg every 15 minutes as needed. Start Robinul 0.2 mg every four hours as needed for secretions. Start PRN Toradol 30 mg every six hours as needed for fever. 3. PRN medications in place for breakthrough symptom management  4. Infection control and prevention as needed 5. Provide support/education to caregiver/family 6. Monitor closely for changes in symptoms  7. Provide support and frequent rounds for patient comfort and safety   8. Maintain skin integrity as tolerated for hospice patient, turning and repositioning for comfort  9. Provide  and  for patient and family support  8. Continue to discuss discharge plan for any changes    Hospice Team Frequency Orders:  Skilled Nurse - Daily x 14 days with 5 PRN visits for symptom control. MSW  1 x weekly with 5 visits PRN family support and need for volunteer services.   1 x weekly with 5 visits PRN spiritual support. CNA  daily x 14 days    ADVANCE CARE PLANNING (Complete in ACP Flow Sheet)   Code Status: DNR  Durable DNR: [x]  Yes  []  No  Code Status Discussed/Confirmed: Andrew Whitlock for Other Life Sustaining Treatment Discussed/Confirmed: Yes  Hospitalization Preference:  No preference   Advance Care Planning 2021   Confirm Advance Directive None   Patient Would Like to Complete Advance Directive Unable        Service: [] Yes  [x]  No      [] Unknown  Appropriate for Pinning Ceremony:  [] Yes     [x] No  Zoroastrian: NO PREFERENCE   Home: [facility] in [place]. DISCHARGE PLANNING     1. Discharge Plan: Patient most likely to pass away at [facility], if patient stabilizes placement will be found for patient  2. Patient/Family teaching: Yes- medications/EOL  3. Response to patient/family teaching: receptive     SOCIAL/EMOTIONAL/SPIRITUAL NEEDS     Spiritual Issues Identified: None noted, patient seems to have a strong family presence. Psych/ Social/ Emotional Issues Identified: None noted, patient seems to have a strong family presence   Caregiver Support:  [x] Provided information on End of Life Care   [x] Material Provided: [facility] or [place] Markzane Cortes contacted, discharge to hospice order received  Dr. Aquiles Abernathy contacted, agrees to serve as attending provider for hospice and provided verbal certification of terminal illness with life expectancy of 6 months or less. Orders for hospice admission, medications and plan of treatment received. Medication reconciliation completed.   MEDS: See medication list below  DME: Per [facility]  Supplies: Per [facility]  IDT communication to include MD, , SW, CH and support team    ALLERGIES AND MEDICATIONS     Allergies: No Known Allergies  Current Facility-Administered Medications   Medication Dose Route Frequency    bisacodyL (DULCOLAX) suppository 10 mg  10 mg Rectal DAILY PRN    LORazepam (ATIVAN) injection 2 mg  2 mg IntraVENous Q2H    LORazepam (ATIVAN) injection 2 mg  2 mg IntraVENous Q15MIN PRN    LORazepam (ATIVAN) injection 4 mg  4 mg IntraVENous Q5MIN PRN    glycopyrrolate (ROBINUL) injection 0.2 mg  0.2 mg IntraVENous Q4H PRN    ketorolac (TORADOL) injection 30 mg  30 mg IntraVENous Q6H PRN    acetaminophen (TYLENOL) suppository 650 mg  650 mg Rectal Q6H PRN    morphine injection 2 mg  2 mg IntraVENous Q15MIN PRN

## 2021-08-13 NOTE — HOSPICE
1330 Patient arrived via Ambulance, patient transferred from stretcher to bed. Patient unresponsive, warm to touch, and coarse lung sounds heard. Patient noted to have very labored breathing. Patient's vital signs obtained by KODY Andrea. 1355 Patient given scheduled IV lorazepam and PRN IV robinul and PRN IV morphine for pain and secretions. 1356 Patient given PRN IV Toradol for noted low grade fever. 1400 Patient turned and repositioned in bed with KODY Andrea, skin checked with HAYDEE Michaud. Skin is intact. 1430 Patient resting in bed quietly, respirations are even and unlabored, neutral facial expression noted. 1500 Patient's son called and given update on patient's arrival, son states he has our phone number if he ever wants to call and check. 1545 Patient given scheduled IV lorazepam, see MAR.    1630 Patient suctioned, secretions cleared. 1730 Patient resting in bed quietly, respirations are even and unlabored. 1810 Patient given scheduled IV lorazepam, PRN IV morphine given for labored breathing, PRN IV robinul given for secretions. 1815 Arora emptied by [employer].

## 2021-08-13 NOTE — H&P
[facility]   [facility]  [phone]    Patient Name: Ethan Peralta  YOB: 1955    Date of Provider Hospice Visit: 08/13/21    Level of Care:   [x] General Inpatient (GIP)    [] Routine   [] Respite    Current Location of Care:  [] [facility] [] [facility] [] [facility] [] [facility] [x] Hospice House [facility], patient referred from:  [] [facility] [] [facility] [] [facility] [] [facility] [] Home [x] Other: [facility]    Date of Original Hospice Admission: 8/13/21  Hospice Medical Director at time of admission: Tripnary Diagnosis: CVA  Diagnoses RELATED to the terminal prognosis: Acute hypoxic respiratory failurestatus post extubation, aspiration pneumonia, sepsis, history of seizures  Other Diagnoses:      HOSPICE SUMMARY     Ethan Peralta is a 76y.o. year old who was admitted to [facility]. Patient is a 62-year-old male who was admitted to [facility] on 8/4/2021 with acute cerebrovascular accident. MRI revealed acute infarction of the cerebellar hemispheres, nemesio, midbrain. Chest x-ray at admission showed extensive new left-sided infiltrate. Patient had been on prior ventilatory support. Patient treated aggressively with IV antibiotics, intubation but continued to show evidence of decline with minimal responsiveness. Family is elected to transition to comfort with the support of hospice. Patient transferred to the community hospice house secondary to [facility] level care needs and significant symptom burden. The patient's principle diagnosis has resulted in respiratory distress  Refer to LCD     Functionally, the patient's Karnofsky and/or Palliative Performance Scale has declined over a period of days to weeks and is estimated at 10 the patient is dependent on the following ADLs: All    Objective information that support this patients limited prognosis includes:   MRI the brain  IMPRESSION  1.   Restricted diffusion involving the cerebellar hemispheres, nemesio, and  possibly the left midbrain most likely representing acute ischemic infarcts  although the brainstem involvement may be partially artifactual. Consider a  short-term interval follow-up MRI. 2.  Attenuated vertebrobasilar flow voids which may be related to underlying  stenosis. CTA or MRA is recommended for further evaluation. 3.  Extensive multifocal encephalomalacia as described above    The patient/family chose comfort measures with the support of Hospice. HOSPICE DIAGNOSES   Active Symptoms:  1. Increased tracheal secretions  2. Respiratory distressstatus post extubation  3. History of seizures  4. Hospice care  5. Recent cerebrovascular accident     PLAN   1. Patient admitted to [facility] care as he needs frequent nursing assessment, IV medication management that is not a to be done in the home setting, increased risk of rapid decline and not safe to transition home or attempt some legal medication. 2. Since he has a history of seizures and now is a new stroke, we will stop antiseizure medication but place him on Ativan 2 mg IV every 2 hours scheduled. He also has seizure doses of 4 mg every 5 minutes as needed. 3. Suspect he will need scheduled Robinul and may be scheduled morphine but patient just arrived to the hospice house and so we will observe for further symptom burden and make adjustments accordingly. 4. Comfort meds for all other symptoms  5. Plan reviewed with bedsidenurse. Patient family involved with transition to the community hospice house and liaison talked with son prior to this transfer. 6.  and SW to support family needs  7. Disposition: Likely will die at the hospice house  8. Hospice Plan of care was reviewed in detail and agree with current plan of care    Prognosis estimated based on 08/13/21 clinical assessment is:   [x] Hours to Days    [] Days to Weeks    [] Other:    Communicated plan of care with: Hospice Case Manager;  Hospice IDT; Care Team GOALS OF CARE     Patient/Medical POA stated Goal of Care: Hospice care    [x] I have reviewed and/or updated ACP information in the Advance Care Planning Navigator. This information is available in the [address] link in the patient's chart header. Primary Decision Maker ([address]):   Primary Decision Maker: Christy Gonsales - Kurt - [phone]    Resuscitation Status: DNR  If DNR is there a Durable DNR on file? : [x] Yes [] No (If no, complete Durable DNR)    HISTORY     History obtained from: Chart, hospice liaison, bedside nurse    CHIEF COMPLAINT: Altered mental status  The patient is:   [] Verbal  [] Nonverbal  [x] Unresponsive    HPI/SUBJECTIVE:  Patient is unresponsive. Difficult to have a full assessment but does show evidence of slight increased work of breathing and certainly audible secretions. REVIEW OF SYSTEMS     The following systems were: [] reviewed  [x] unable to be reviewed    Positive ROS include:  Constitutional: fatigue, weakness, in pain, short of breath  Ears/nose/mouth/throat: increased airway secretions  Respiratory:shortness of breath, wheezing  Gastrointestinal:poor appetite, nausea, vomiting, abdominal pain, constipation, diarrhea  Musculoskeletal:pain, deformities, swelling legs  Neurologic:confusion, hallucinations, weakness  Psychiatric:anxiety, feeling depressed, poor sleep  Endocrine:     Adult Non-Verbal Pain Assessment Score:      Face  [] 0   No particular expression or smile  [x] 1   Occasional grimace, tearing, frowning, wrinkled forehead  [] 2   Frequent grimace, tearing, frowning, wrinkled forehead    Activity (movement)  [] 0   Lying quietly, normal position  [] 1   Seeking attention through movement or slow, cautious movement  [x] 2   Restless, excessive activity and/or withdrawal reflexes    Guarding  [] 0   Lying quietly, no positioning of hands over areas of body  [x] 1   Splinting areas of the body, tense  [] 2   Rigid, stiff    Physiology (vital signs)  [x] 0   Stable vital signs  [] 1   Change in any of the following: SBP > 20mm Hg; HR > 20/minute  [] 2   Change in any of the following: SBP > 30mm Hg; HR > 25/minute    Respiratory  [] 0   Baseline RR/SpO2, compliant with ventilator  [x] 1   RR > 10 above baseline, or 5% drop SpO2, mild asynchrony with ventilator  [] 2   RR > 20 above baseline, or 10% drop SpO2, asynchrony with ventilator     FUNCTIONAL ASSESSMENT     Palliative Performance Scale (PPS):10       PSYCHOSOCIAL/SPIRITUAL ASSESSMENT     Active Problems:    * No active hospital problems. *    Past Medical History:   Diagnosis Date    [facility]) [facility]     Seizure ([facility]       No past surgical history on file. Social History     Tobacco Use    Smoking status: Former Smoker    Smokeless tobacco: Never Used   Substance Use Topics    Alcohol use: Not Currently     No family history on file.    No Known Allergies   Current Facility-Administered Medications   Medication Dose Route Frequency    bisacodyL (DULCOLAX) suppository 10 mg  10 mg Rectal DAILY PRN    LORazepam (ATIVAN) injection 2 mg  2 mg IntraVENous Q2H    LORazepam (ATIVAN) injection 2 mg  2 mg IntraVENous Q15MIN PRN    LORazepam (ATIVAN) injection 4 mg  4 mg IntraVENous Q5MIN PRN    glycopyrrolate (ROBINUL) injection 0.2 mg  0.2 mg IntraVENous Q4H PRN    ketorolac (TORADOL) injection 30 mg  30 mg IntraVENous Q6H PRN    acetaminophen (TYLENOL) suppository 650 mg  650 mg Rectal Q6H PRN    morphine injection 2 mg  2 mg IntraVENous Q15MIN PRN        PHYSICAL EXAM     Wt Readings from Last 3 Encounters:   08/10/21 79.5 kg (175 lb 4.3 oz)       Visit Vitals  Pulse 67   Temp 99.6 °F (37.6 °C)   Resp 20   SpO2 92%       Supplemental O2  [x] Yes  [] NO  Last bowel movement:     Currently this patient has:  [x] Peripheral IV [] PICC  [] PORT [] ICD    [] Arora Catheter [] NG Tube   [] PEG Tube    [] Rectal Tube [] Drain  [] Other:     Constitutional: Ill-appearing, unresponsive, audible secretions  Eyes: Minimally reactive  ENMT: Dry  Cardiovascular: Slightly tachycardic  Respiratory:  Audible secretions, respiratory rate low 20s, slight accessory muscle use  Gastrointestinal: Soft  Musculoskeletal: Muscle wasting  Skin: Dry slightly scaly skin in the lower extremities  Neurologic: Unresponsive, nonverbal, unable to participate in exam  Psychiatric: Slightly restless  Other:       Pertinent Lab and or Imaging Tests:  Lab Results   Component Value Date/Time    Sodium 144 08/11/2021 04:40 AM    Sodium 145 08/11/2021 04:40 AM    Potassium 3.9 08/11/2021 04:40 AM    Potassium 3.9 08/11/2021 04:40 AM    Chloride 111 (H) 08/11/2021 04:40 AM    Chloride 111 (H) 08/11/2021 04:40 AM    CO2 29 08/11/2021 04:40 AM    CO2 30 08/11/2021 04:40 AM    Anion gap 4 (L) 08/11/2021 04:40 AM    Anion gap 4 (L) 08/11/2021 04:40 AM    Glucose 108 (H) 08/11/2021 04:40 AM    Glucose 108 (H) 08/11/2021 04:40 AM    BUN 21 (H) 08/11/2021 04:40 AM    BUN 21 (H) 08/11/2021 04:40 AM    Creatinine 0.68 (L) 08/11/2021 04:40 AM    Creatinine 0.69 (L) 08/11/2021 04:40 AM    BUN/Creatinine ratio 31 (H) 08/11/2021 04:40 AM    BUN/Creatinine ratio 30 (H) 08/11/2021 04:40 AM    GFR est AA >60 08/11/2021 04:40 AM    GFR est AA >60 08/11/2021 04:40 AM    GFR est non-AA >60 08/11/2021 04:40 AM    GFR est non-AA >60 08/11/2021 04:40 AM    Calcium 8.3 (L) 08/11/2021 04:40 AM    Calcium 8.4 (L) 08/11/2021 04:40 AM     Lab Results   Component Value Date/Time    Protein, total 6.4 08/11/2021 04:40 AM    Albumin 2.2 (L) 08/11/2021 04:40 AM    Albumin 2.2 (L) 08/11/2021 04:40 AM           Total time:   Counseling / coordination time:   > 50% counseling / coordination?:

## 2021-08-13 NOTE — DISCHARGE INSTRUCTIONS
Patient Education     Altered Mental Status: Care Instructions  Your Care Instructions  Altered mental status is a change in how well your brain is working. As a result, you may be confused, be less alert than usual, or act in odd ways. This may include seeing or hearing things that aren't really there (hallucinations). A mental status change has many possible causes. For example, it may be the result of an infection, an imbalance of chemicals in the body, or a chronic disease such as diabetes or COPD. It can also be caused by things such as a head injury, taking certain medicines, or using alcohol or drugs. The doctor may do tests to look for the cause. These tests may include urine tests, blood tests, and imaging tests such as a CT scan. Sometimes a clear cause isn't found. But tests can help the doctor rule out a serious cause of your symptoms. A change in mental status can be scary. But mental status will often return to normal when the cause is treated. So it is important to get any follow-up testing or treatment the doctor has suggested. The doctor has checked you carefully, but problems can develop later. If you notice any problems or new symptoms, get medical treatment right away. Follow-up care is a key part of your treatment and safety. Be sure to make and go to all appointments, and call your doctor if you are having problems. It's also a good idea to know your test results and keep a list of the medicines you take. How can you care for yourself at home? · Be safe with medicines. Take your medicines exactly as prescribed. Call your doctor if you think you are having a problem with your medicine. · Have another adult stay with you until you are better. This can help keep you safe. Ask that person to watch for signs that your mental status is getting worse. When should you call for help? Call 911 anytime you think you may need emergency care.  For example, call if:  · You passed out (lost consciousness). Call your doctor now or seek immediate medical care if:  · Your mental status is getting worse. · You have new symptoms, such as a fever, chills, or shortness of breath. · You do not feel safe. Watch closely for changes in your health, and be sure to contact your doctor if:  · You do not get better as expected. Where can you learn more? Go to "[employer].be  Enter J452 in the search box to learn more about \"Altered Mental Status: Care Instructions. \"   © 5106-7475 Healthwise, Inc[employer]. Care instructions adapted under license by [facility] (which disclaims liability or warranty for this information). This care instruction is for use with your licensed healthcare professional. If you have questions about a medical condition or this instruction, always ask your healthcare professional. [address] any warranty or liability for your use of this information. Content Version: 06.8.528927; Current as of: November 20, 2015    Shortness of Breath: Care Instructions  Your Care Instructions     Shortness of breath has many causes. Sometimes conditions such as anxiety can lead to shortness of breath. Some people get mild shortness of breath when they exercise. Trouble breathing also can be a symptom of a serious problem, such as asthma, lung disease, emphysema, heart problems, and pneumonia. If your shortness of breath continues, you may need tests and treatment. Watch for any changes in your breathing and other symptoms. Follow-up care is a key part of your treatment and safety. Be sure to make and go to all appointments, and call your doctor if you are having problems. It's also a good idea to know your test results and keep a list of the medicines you take. How can you care for yourself at home? · Do not smoke or allow others to smoke around you. If you need help quitting, talk to your doctor about stop-smoking programs and medicines.  These can increase your chances of quitting for good. · Get plenty of rest and sleep. · Take your medicines exactly as prescribed. Call your doctor if you think you are having a problem with your medicine. · Find healthy ways to deal with stress. ? Exercise daily. ? Get plenty of sleep. ? Eat regularly and well. When should you call for help? Call 911 anytime you think you may need emergency care. For example, call if:    · You have severe shortness of breath.     · You have symptoms of a heart attack. These may include:  ? Chest pain or pressure, or a strange feeling in the chest.  ? Sweating. ? Shortness of breath. ? Nausea or vomiting. ? Pain, pressure, or a strange feeling in the back, neck, jaw, or upper belly or in one or both shoulders or arms. ? Lightheadedness or sudden weakness. ? A fast or irregular heartbeat. After you call 911, the  may tell you to chew 1 adult-strength or 2 to 4 low-dose aspirin. Wait for an ambulance. Do not try to drive yourself. Call your doctor now or seek immediate medical care if:    · Your shortness of breath gets worse or you start to wheeze. Wheezing is a high-pitched sound when you breathe.     · You wake up at night out of breath or have to prop your head up on several pillows to breathe.     · You are short of breath after only light activity or while at rest.   Watch closely for changes in your health, and be sure to contact your doctor if:    · You do not get better over the next 1 to 2 days. Where can you learn more? Go to http://www.gray.com/  Enter S780 in the search box to learn more about \"Shortness of Breath: Care Instructions. \"  Current as of: October 26, 2020               Content Version: 12.8  © 2322-6969 Healthwise, [employer]. Care instructions adapted under license by [employer] (which disclaims liability or warranty for this information).  If you have questions about a medical condition or this instruction, always ask your healthcare professional. [place] [address] any warranty or liability for your use of this information.

## 2021-08-13 NOTE — PROGRESS NOTES
NAME OF PATIENT:  Dinh Raza    LEVEL OF CARE:  GIP    REASON FOR GIP:   Pain, despite numerous changes in medications and Stabilizing treatment that cannot take place at home    *PATIENT REMAINS ELIGIBLE FOR GIP LEVEL OF CARE AS EVIDENCED BY:  Need for frequent monitoring and IV medications to provide comfort  O2 SAFETY:  Concentrator positioning (6\" from furniture/drapes)    FALL INTERVENTIONS PROVIDED:   Implemented/recommended resources for alarm system (personal alarm, bed alarm, call bell, etc.)  and Implemented/recommended increased supervision/assistance    INTERDISPLINARY COMMUNICATION/COLLABORATION:  Physician, MSW, [place] and RN, CNA    NEW MEDICATION INITIATION DOCUMENTATION:  N/A    Reason medication is being initiated:  N/A    MD / Provider name consulted re: change in status / initiation of new medication:  N/A    New Symptom(s):  N/A    New Order(s):  N/A    Name of the person notified of the changes:  N/A    Name of person being taught:  N/A    Instructions given:  N/A    Side Effects taught:  N/A    Response to teaching:  N/A      COMFORTABLE DYING MEASURE:  Is Patient/family satisfied with symptom level?  yes    DISCHARGE PLAN:  Likely end of life  1951  PA completed. Scheduled lorazepam given. Coarse lung sounds noted with irregular respirations and apneic periods. Will continue to [facility], [facility]  2051  Continues with irregular respirations and apneic periods. Facial muscles relaxed. Will continue to monitor  2209  Scheduled lorazepam with prn dose of Robinul 2nd to very coarse respirations. Positioned on R side and mouth care provided. Will continue to monitor  2248  Resting quietly with relaxed facial expression and unlabored more regular respirations noted. Will continue to monitor  2337  Scheduled medications as per MARs. Additional prn dose of morphine given prn with noted use of shoulder muscles to aid breathing.   Will continue to monitor  0048  Resting quietly with unlabored respirations. Will continue to monitor  0158  Scheduled medication as per MARs. PRN dose of morphine 2nd to increase RR of 28. Removed R upper arm PIV as noted to be leaking. Will continue to monitor  0258  Bed and bath completed with CNA. Positioned on L side. Increase RR to 36 with use of accessory muscles. PRN dose of lorazepam and morphine as per MARs. Continuing to monitor  0315 RR continues to be elevated at 34 with positive use of accessory muscles. PRN dose of lorazepam and morphine as per MARs. 0333  RR down to 32 with continued yet decreased used of accessory muscles. HRR at 100. PRN dose of morphine with scheduled lorazepam.  Continuing to monitor  0412  Shallow slightly labored respirations of 32. PRN dose of lorazepam and morphine as per MARs. Continuing to monitor  0428  RR at 28 with minimal use of accessory muscles. Continuing to monitor  0504 RR at 30 with slight use of abdominal muscles to aid breathing. PRN dose of lorazepam and morphine as per MARs. Continuing to monitor  0521 RR down to 28 with minimal use of accessory muscles. Continuing to monitor  0548  RR up to 32 again with slight use of abdominal and chest muscles to aid breathing. PRN morphine with scheduled lorazepam at this time. Emptied 250 cc tea colored urine from zaidi. Will continue to monitor  0630  RR down to 24 with some use of accessory muscles. Respirations coarse at present. Prn morphine and lorazepam as per MARs. Will continue to monitor  0648 RR down to 20. Short apneic period noted.   Slight use of abdominal muscles continue.    0700  Report to oncoming shift

## 2021-08-14 NOTE — PROGRESS NOTES
Problem: Pressure Injury - Risk of  Goal: *Prevention of pressure injury  Description: Document Eliud Scale and appropriate interventions in the flowsheet. Outcome: Progressing Towards Goal  Note: Pressure Injury Interventions:  Sensory Interventions: Float heels, Minimize linen layers, Assess need for specialty bed    Moisture Interventions: Absorbent underpads, Minimize layers, Internal/External urinary devices, Limit adult briefs, Moisture barrier, Assess need for specialty bed    Activity Interventions: Assess need for specialty bed    Mobility Interventions: Assess need for specialty bed, Chair cushion, Float heels    Nutrition Interventions: Document food/fluid/supplement intake    Friction and Shear Interventions: Apply protective barrier, creams and emollients, Minimize layers, Lift sheet                Problem: Patient Education: Go to Patient Education Activity  Goal: Patient/Family Education  Outcome: Progressing Towards Goal     Problem: Falls - Risk of  Goal: *Absence of Falls  Description: Document Maddison Alvo Fall Risk and appropriate interventions in the flowsheet.   Outcome: Progressing Towards Goal  Note: Fall Risk Interventions:  Mobility Interventions: Bed/chair exit alarm    Mentation Interventions: Adequate sleep, hydration, pain control, Bed/chair exit alarm, Door open when patient unattended    Medication Interventions: Bed/chair exit alarm    Elimination Interventions: Bed/chair exit alarm, Elevated toilet seat              Problem: Patient Education: Go to Patient Education Activity  Goal: Patient/Family Education  Outcome: Progressing Towards Goal     Problem: Emotional Support Needs  Goal: Patient/family is receiving emotional support  Outcome: Progressing Towards Goal     Problem: Pain  Goal: Assess satisfaction of level of comfort and symptom control  Outcome: Progressing Towards Goal     Problem: Pain  Goal: Assess satisfaction of level of comfort and symptom control  Outcome: Progressing Towards Goal     Problem: Breathing Pattern - Ineffective  Goal: *Use of effective breathing techniques  Outcome: Progressing Towards Goal     Problem: Seizures  Goal: *STG: Remains free of seizure activity  Outcome: Progressing Towards Goal  Goal: *STG: Remains free of injury during seizure activity  Outcome: Progressing Towards Goal  Goal: Interventions  Outcome: Progressing Towards Goal     Problem: Infection - Risk of, Urinary Catheter-Associated Urinary Tract Infection  Goal: *Absence of infection signs and symptoms  Outcome: Progressing Towards Goal

## 2021-08-14 NOTE — PROGRESS NOTES
Problem: Pain  Goal: Assess satisfaction of level of comfort and symptom control  Outcome: Progressing Towards Goal     Problem: Breathing Pattern - Ineffective  Goal: *Use of effective breathing techniques  Outcome: Progressing Towards Goal     Problem: Seizures  Goal: *STG: Remains free of seizure activity  Outcome: Progressing Towards Goal  Goal: *STG: Remains free of injury during seizure activity  Outcome: Progressing Towards Goal     Problem: Infection - Risk of, Urinary Catheter-Associated Urinary Tract Infection  Goal: *Absence of infection signs and symptoms  Outcome: Progressing Towards Goal

## 2021-08-14 NOTE — HOSPICE
0700 Report received from [facility]. 0730 Patient resting in bed quietly, audible secretions noted, periods of apnea noted. Neutral facial expression noted. 3436 Patient given scheduled IV lorazepam, see MAR. Patient noted to have audible secretions and labored breathing, PRN IV morphine and PRN IV robinul given. 0830 Patient resting in bed quietly, eyes are closed, neutral facial expression noted. PRN IV morphine effective. 7988 Oral care performed. 4553 Patient given scheduled IV lorazepam, see MAR. Patient noted to have labored breathing in between periods of apnea. PRN IV morphine given. 1000 Patient repositioned in bed with KODY Lino. Patient no longer with labored breathing, PRN morphine effective. 1055 Oral care performed, lip balm applied to patient's lips. 1135 Patient given scheduled IV lorazepam, see MAR. Patient noted to have labored breathing and audible secretions. Patient given PRN IV robinul and PRN IV morphine. Will continue to monitor. 1220 Oral care performed, lip balm applied to patient's lips. 1250 Patient resting in bed quietly, eyes are closed, neutral facial expression noted. 1335 Patient given scheduled medication. PRN IV morphine given for labored breathing, will continue to monitor. 1425 Patient resting in bed quietly, eyes are closed, neutral facial expression noted. 1505 Patient given scheduled IV lorazepam, see MAR. Patient noted to be grimacing and have secretions. PRN IV morphine and PRN IV robinul given. 1555 Patient resting in bed quietly, neutral facial expression noted. 1645 Oral care performed, lip balm applied to patient's lips. 1720 Patient given scheduled medication, see MAR. Patient noted to be grimacing, gave PRN IV morphine, see MAR. Will continue to monitor. 1750 Patient no longer grimacing, PRN medication effective. [address] Patient resting in bed quietly, eyes are closed, neutral facial expression noted. NAME OF PATIENT:  Gwendolyn Sep    LEVEL OF CARE:  GIP    REASON FOR GIP:   Medication adjustment that must be monitored 24/7 and Stabilizing treatment that cannot take place at home    *PATIENT REMAINS ELIGIBLE FOR GIP LEVEL OF CARE AS EVIDENCED BY:  Need for PRN and scheduled IV medications      REASON FOR RESPITE:  n/a  O2 SAFETY:  n/a    FALL INTERVENTIONS PROVIDED:   Implemented/recommended use of fall risk identification flag to all team members, Implemented/recommended environmental changes (remove hazards, lower bed, improve lighting, etc.) and Implemented/recommended increased supervision/assistance    INTERDISPLINARY COMMUNICATION/COLLABORATION:  Physician, MSW, [place] and RN, CNA    NEW MEDICATION INITIATION DOCUMENTATION:  n/a    Reason medication is being initiated:  n/a    MD / Provider name consulted re: change in status / initiation of new medication:  n/a    New Symptom(s):  n/a    New Order(s):  n/a    Name of the person notified of the changes:  n/a    Name of person being taught:  n/a    Instructions given: n/a    Side Effects taught:  n/a    Response to teaching:  n/a      COMFORTABLE DYING MEASURE:  Is Patient/family satisfied with symptom level? Y    DISCHARGE PLAN:  Patient most likely to pass away at [facility], if patient stabilizes placement will be found for patient.

## 2021-08-15 NOTE — HOSPICE
1900: report received from [facility]: assessment and vitals completed, pt resting in bed with eyes closed and is unresponsive, see flowhseets for full assessment  2009: pt is having 10-15 second periods of apnea. RR 10 with accessory muscle use. Scheduled IV ativan given, PRN IV morphine and PRN IV robinul given   2050: PRN IV medications effective. Respirations are unlabored, RR 10  2140: pt resting quietly, upper extremities are cool, RR10 with periods of apnea  2210: scheduled IV ativan given, PRN IV morphine given for dyspnea/ labored breathing   2300: pt resting quietly, mouth care completed and lip moisturizer applied  2336: RR 24 and labored pt is also grimacing. PRN IV morphine given, will monitor for effectiveness  2350: pt continues to be dyspneic RR 26 and labored. PRN IV morphine given  0026: Pt continues with labored breathing with accessory muscle use despite receiving 2 PRN doses of morphine in the last hour. Orders received from Hilario Tenorio NP to increase morphine dosage to 4mg IV PRN every 15 minutes  0030: 4mg IV morphine given  0100: respirations are irregular but unlabored, RR 14  0200: pt resting quietly with eyes closed, no grimacing noted  0255: pt given bed bath, became dyspneic afterwards, RR 26 with audible secretions, medicated with PRN Morphine and PRN robinul  0342: IV medications effective, RR14 and unlabored  0425: Scheduled IV ativan given.  Pt is calm with relaxed facial expression  0510: pt is resting with relaxed facial expression  0619: Scheduled ativan and PRN morphine given for labored respirations  0700: report given to oncoming nurse         NAME OF PATIENT:  Robert Ihsan    LEVEL OF CARE: GIP    REASON FOR GIP:   Pain, despite numerous changes in medications, Medication adjustment that must be monitored 24/7 and Stabilizing treatment that cannot take place at home    *PATIENT REMAINS ELIGIBLE FOR GIP LEVEL OF CARE AS EVIDENCED BY: PT requires use of scheduled and PRN IV medications to manage symptoms of pain, dyspnea, and seziures    REASON FOR RESPITE:  n/a    O2 SAFETY:  Concentrator positioning (6\" from furniture/drapes) and No petroleum based products on face while oxygen in use    FALL INTERVENTIONS PROVIDED:   Implemented/recommended resources for alarm system (personal alarm, bed alarm, call bell, etc.) , Implemented/recommended environmental changes (remove hazards, lower bed, improve lighting, etc.) and Implemented/recommended increased supervision/assistance    INTERDISPLINARY COMMUNICATION/COLLABORATION:  Physician, MSW, [place] and RN, CNA    NEW MEDICATION INITIATION DOCUMENTATION:  n/a    Reason medication is being initiated: n/a    MD / Provider name consulted re: change in status / initiation of new medication: n/a    New Symptom(s): n/a    New Order(s): n/a    Name of the person notified of the changes: n/a    Name of person being taught: n/a    Instructions given: n/a    Side Effects taught: n/a    Response to teaching: n/a      COMFORTABLE DYING MEASURE:  Is Patient/family satisfied with symptom level?  yes    DISCHARGE PLAN: Pt will likely  at [facility], if he stabilizes placement will be found for him

## 2021-08-15 NOTE — PROGRESS NOTES
..  0700:Report received from [facility] using SBAR I/O and Kardex. Pt is unresponsive to verbal and tactile stimuli. Pt breathing is labored color is ashen. 08:35:Adm Schedule ativan with PRN morphine and robinul for pain and resp distress. 900:Meds show some effectiveness, continue to monitor. 10:15:Pt respositioned for comfort mouth care done. 10:57:Adm schedule ativan, tolerated well. 12:07:Pt breathing is labored adm schedule ativan, mouth care done. 1300:Shallow resp noted with 30 sec apnea. 14:14:Adm schedule ativan with PRN morphine for grimace and moaning. Pt's son at bedside   emotional support given. 1500:Pt is less labored meds show some effect. 16:45:New subq site added, adm schedule meds. NP Marta saw pt new orders to schedule morphine every 2 hours SEE MAR.   17:57:Adm schedule morphine and ativan pt breathing is labored grimaced when touched.  1900:Report given to [facility].     LEVEL OF CARE: GIP  REASON FOR GIP: Pain and Resp distress   *PATIENT REMAINS ELIGIBLE FOR GIP LEVEL OF CARE AS EVIDENCED BY: Pain Resp distress and increase Secretions.    REASON FOR RESPITE:n/a       O2 SAFETY:  Concentrator positioning (6\" from furniture/drapes), Tanks stored in [place] , No petroleum based products on face while oxygen in use and Oxygen sign on the door     FALL INTERVENTIONS PROVIDED:   Implemented/recommended use of fall risk identification flag to all team members, Implemented/recommended assistive devices and encouraged their use, Implemented/recommended resources for alarm system (personal alarm, bed alarm, call bell, etc.)  and Implemented/recommended environmental changes (remove hazards, lower bed, improve lighting, etc.)     INTERDISPLINARY COMMUNICATION/COLLABORATION:  Physician, MSW, [place] and RN, CNA     NEW MEDICATION INITIATION DOCUMENTATION:N/A      Reason medication is being initiated: Pain and resp distress   MD / Provider name consulted re: change in status / initiation of new medication:  NP Marta   New Symptom(s):  resp distress     New Order(s):  SEE MAR   Name of the person notified of the changes:  [address] [place]  Name of person being taught: son     Instructions given:  yes     Side Effects taught:  yes     Response to teaching: good        COMFORTABLE DYING MEASURE:  Is Patient/family satisfied with symptom level?  yes     DISCHARGE PLAN:Pt will remain at [facility] until she passes.

## 2021-08-15 NOTE — PROGRESS NOTES
Problem: Pressure Injury - Risk of  Goal: *Prevention of pressure injury  Description: Document Eliud Scale and appropriate interventions in the flowsheet.   Outcome: Not Progressing Towards Goal  Note: Pressure Injury Interventions:  Sensory Interventions: Avoid rigorous massage over bony prominences, Float heels, Keep linens dry and wrinkle-free    Moisture Interventions: Absorbent underpads    Activity Interventions: Pressure redistribution bed/mattress(bed type)    Mobility Interventions: Float heels, HOB 30 degrees or less    Nutrition Interventions: Document food/fluid/supplement intake, Offer support with meals,snacks and hydration    Friction and Shear Interventions: Apply protective barrier, creams and emollients, HOB 30 degrees or less, Lift sheet, Minimize layers                Problem: Pain  Goal: Assess satisfaction of level of comfort and symptom control  Outcome: Not Progressing Towards Goal     Problem: Breathing Pattern - Ineffective  Goal: *Use of effective breathing techniques  Outcome: Not Progressing Towards Goal

## 2021-08-15 NOTE — PROGRESS NOTES
Problem: Pressure Injury - Risk of  Goal: *Prevention of pressure injury  Description: Document Eliud Scale and appropriate interventions in the flowsheet. 8/14/2021 2030 by Juanita HERNANDES  Outcome: Progressing Towards Goal  Note: Pressure Injury Interventions:  Sensory Interventions: Float heels, Keep linens dry and wrinkle-free, Minimize linen layers    Moisture Interventions: Absorbent underpads, Minimize layers    Activity Interventions: Pressure redistribution bed/mattress(bed type)    Mobility Interventions: Float heels, HOB 30 degrees or less    Nutrition Interventions: Document food/fluid/supplement intake    Friction and Shear Interventions: HOB 30 degrees or less, Minimize layers             8/14/2021 2030 by Juanita HERNANDES  Outcome: Progressing Towards Goal  Note: Pressure Injury Interventions:  Sensory Interventions: Float heels, Keep linens dry and wrinkle-free, Minimize linen layers    Moisture Interventions: Absorbent underpads, Minimize layers    Activity Interventions: Pressure redistribution bed/mattress(bed type)    Mobility Interventions: Float heels, HOB 30 degrees or less    Nutrition Interventions: Document food/fluid/supplement intake    Friction and Shear Interventions: HOB 30 degrees or less, Minimize layers                Problem: Falls - Risk of  Goal: *Absence of Falls  Description: Document Emily Fall Risk and appropriate interventions in the flowsheet.   Outcome: Progressing Towards Goal  Note: Fall Risk Interventions:  Mobility Interventions: Bed/chair exit alarm    Mentation Interventions: Adequate sleep, hydration, pain control, Bed/chair exit alarm, Reorient patient    Medication Interventions: Bed/chair exit alarm    Elimination Interventions: Bed/chair exit alarm              Problem: Seizures  Goal: *STG: Remains free of seizure activity  Outcome: Progressing Towards Goal     Problem: Infection - Risk of, Urinary Catheter-Associated Urinary Tract Infection  Goal: *Absence of infection signs and symptoms  Outcome: Progressing Towards Goal

## 2021-08-16 NOTE — HOSPICE
1900: report received from [facility], [address] [place] [address]: assessment completed, see flow sheets for full assessment. Pt is laying in bed unresponsive to all stimuli. Respirations are irregular but unlabored  2010: pt resting quietly, RR 22  2040: scheduled IV ativan and IV morphine given. New 22g IV placed in L forearm   2130: pt resting quietly, relaxed facial expression   2215: scheduled IV ativan and IV morphine given  2245: pt is dyspneic, RR 24 and labored with accessory muscle use. PRN IV Morphine given  2328: Pt continues to be dyspneic- RR 30, pt now with audible secretions- PRN IV robinul given, small amount of thick tan secretions suctioned  0010: orders received from Caesar Gill NP to increase scheduled IV morphine to 6mg as well as increase PRN dose to 6mg  0105: pt is dyspneic, RR 27, 6mg IV morphine given  0140: pt resting, respirations are less labored, RR 20  0220: Pt turned onto R side, mouth care completed  0245: scheduled IV medications given. Pt skin is cool and dry  0330:pt resting quietly with eyes closed. 250mL of urine emptied from zaidi  0425: scheduled Morphine and ativan given  0500: pt bathed, teeth brushed, tolerated well. Now resting quietly  0615: scheduled IV ativan and IV morphine given, pt having 20 second periods of apnea and audible secretions, PRN Robinul given  0700: report given to oncoming nurse        NAME OF PATIENT:  Vipul Barry    LEVEL OF CARE: GIP    REASON FOR GIP:   Pain, despite numerous changes in medications, Unmanageable respiratory distress, Medication adjustment that must be monitored 24/7 and Stabilizing treatment that cannot take place at home    *PATIENT REMAINS ELIGIBLE FOR GIP LEVEL OF CARE AS EVIDENCED BY: Pt requires Scheduled and PRN IV medications and titration of dosages to manage symptoms.  Pt also requires frequent nursing assessments      REASON FOR RESPITE:  n/a    O2 SAFETY:  n/a    FALL INTERVENTIONS PROVIDED:   Implemented/recommended resources for alarm system (personal alarm, bed alarm, call bell, etc.) , Implemented/recommended environmental changes (remove hazards, lower bed, improve lighting, etc.) and Implemented/recommended increased supervision/assistance    INTERDISPLINARY COMMUNICATION/COLLABORATION:  Physician, MSW, [place] and RN, CNA    NEW MEDICATION INITIATION DOCUMENTATION:  n/a    Reason medication is being initiated:  n/a    MD / Provider name consulted re: change in status / initiation of new medication: n/a    New Symptom(s): n/a    New Order(s): n/a    Name of the person notified of the changes: n/a    Name of person being taught:n/a    Instructions given: n/a    Side Effects taught: n/a    Response to teaching: n/a      COMFORTABLE DYING MEASURE:  Is Patient/family satisfied with symptom level?  yes    DISCHARGE PLAN:  PT will likely  at [facility], if he stabilizes placement will be found for him

## 2021-08-16 NOTE — PROGRESS NOTES
..  0700:Report received from [facility] using SBAR I/O and Kardex. Pt is unresponsive to verbal and tactile stimuli, breathing is labored increase secretions noted suctioning done HOB elevated mouth care done. 08:07:Adm schedule morphine and ativan. 09:35;Meds is not efective continues to be labored. 6417:ALEKS Mendez rounds new orders to change from morphine to Dilaudid and Robinul frequency SEE MAR.  10:16:Adm schedule dilaudid and ativan for labored breathing, pt is uding his assessory muscles to breathe. 10:31:Pt is resting more comfortable adm Robinul PRN for secretions suctioning done. 11:30:Pt continues to be unresponsive with periods of 50 sec apnea. 11:58:Adm schedule dilaudid and ativan for facial grimace and restlessness. 12:30:Pt looks more comfortable continues to have periods of apnea 45 secs. 13:56:Adm schedule dilaudid and ativan with PRN Robinul for increase secretions. 14:35:Pt resting more comfortable meds effective. 15:20:Shallow resp noted skin color ashen lips are gray no suctioning done for secretions, comfort and safety maintained. 16:25:Adm dilaudid and ativan, tolerating well.  1700:Pt is breathing shallow with 50 sec apnea, comfort and safety maintained. 18:15:Adm schedule meds, tolerated well.  1900:Report given to [facility].                 NAME OF PATIENT: Geovanna Gan     LEVEL OF CARE: GIP  REASON FOR GIP: Pain Resp distress and increase secretions     *PATIENT REMAINS ELIGIBLE FOR [facility] LEVEL OF CARE AS EVIDENCED BY:     REASON FOR RESPITE:n/a       O2 SAFETY:  Concentrator positioning (6\" from furniture/drapes), Tanks stored in [place] , No petroleum based products on face while oxygen in use and Oxygen sign on the door     FALL INTERVENTIONS PROVIDED:   Implemented/recommended use of fall risk identification flag to all team members, Implemented/recommended assistive devices and encouraged their use, Implemented/recommended resources for alarm system (personal alarm, bed alarm, call bell, etc.)  and Implemented/recommended environmental changes (remove hazards, lower bed, improve lighting, etc.)     INTERDISPLINARY COMMUNICATION/COLLABORATION:  Physician, MSW, [place] and RN, CNA     NEW MEDICATION INITIATION DOCUMENTATION:SEE MAR      Reason medication is being initiated: Pain Resp distress     MD / Provider name consulted re: change in status / initiation of new medication:  Dr Suellen Gill Symptom(s):  Resp distress     New Order(s):  SEE MAR     Name of the person notified of the changes:  Son      Name of person being taught:  son     Instructions given:  yes     Side Effects taught:  yes     Response to teaching: good      COMFORTABLE DYING MEASURE:  Is Patient/family satisfied with symptom level?  yes     DISCHARGE PLAN: Pt will remain at [facility] until he passes.

## 2021-08-16 NOTE — PROGRESS NOTES
[facility]   Good Help to Those in Need  [phone]    Patient Name: Andrew Brasher  YOB: 1955    Date of Provider Hospice Visit: 8/15/21    Level of Care:   [x] General Inpatient (GIP)    [] Routine   [] Respite    Current Location of Care:  [] [facility] [] [facility] [] [facility] [] [address] [x] Hospice House [facility] [place], patient referred from:  [] [facility] [] [facility] [] [facility] [] [address] [] Home [x] Other: [facility]    Date of Original Hospice Admission: 8/13/21  Hospice Medical Director at time of admission: [facility] Diagnosis: CVA  Diagnoses RELATED to the terminal prognosis: Acute hypoxic respiratory failurestatus post extubation, aspiration pneumonia, sepsis, history of seizures  Other Diagnoses:      HOSPICE SUMMARY     Anderw Brasher is a 76y.o. year old who was admitted to [facility]. Patient is a 71-year-old male who was admitted to [facility] on 8/4/2021 with acute cerebrovascular accident. MRI revealed acute infarction of the cerebellar hemispheres, nemesio, midbrain. Chest x-ray at admission showed extensive new left-sided infiltrate. Patient had been on prior ventilatory support. Patient treated aggressively with IV antibiotics, intubation but continued to show evidence of decline with minimal responsiveness. Family is elected to transition to comfort with the support of hospice. Patient transferred to the [facility] hospice house secondary to [facility] level care needs and significant symptom burden. The patient's principle diagnosis has resulted in respiratory distress  Refer to LCD     Functionally, the patient's Karnofsky and/or Palliative Performance Scale has declined over a period of days to weeks and is estimated at 10 the patient is dependent on the following ADLs: All    Objective information that support this patients limited prognosis includes:   MRI the brain  IMPRESSION  1.   Restricted diffusion involving the cerebellar hemispheres, nemesio, and  possibly the left midbrain most likely representing acute ischemic infarcts  although the brainstem involvement may be partially artifactual. Consider a  short-term interval follow-up MRI. 2.  Attenuated vertebrobasilar flow voids which may be related to underlying  stenosis. CTA or MRA is recommended for further evaluation. 3.  Extensive multifocal encephalomalacia as described above    The patient/family chose comfort measures with the support of Hospice. HOSPICE DIAGNOSES   Active Symptoms:  1. Increased tracheal secretions  2. Respiratory distressstatus post extubation  3. History of seizures  4. Hospice care  5. Recent cerebrovascular accident     PLAN   1. Patient will continue [facility] level care as he needs frequent nursing assessment, multiple adjustments in IV medication management, and now showing evidence of increased work of breathing, nonverbal signs of pain. His morphine was adjusted multiple times over the weekend. 2. Continue Ativan 2 mg IV every 2 hours scheduled. He also has seizure doses of 4 mg every 5 minutes as needed. 3. Rotate his opioid to Dilaudid 2 mg IV every 2 hours scheduled every 15 minutes as needed for both pain and shortness of breath. He had escalating doses of morphine which appeared to be less and less effective. He does have IV access. 4. Comfort meds for all other symptoms  5. Plan reviewed with bedside nurse. 6.  and SW to support family needs  7. Disposition: Likely will die at the hospice house  8. Hospice Plan of care was reviewed in detail and agree with current plan of care    Prognosis estimated based on 08/16/21 clinical assessment is:   [x] Hours to Days    [] Days to Weeks    [] Other:    Communicated plan of care with: Hospice Case Manager;  Hospice IDT; Care Team     GOALS OF CARE     Patient/Medical POA stated Goal of Care: Hospice care    [x] I have reviewed and/or updated ACP information in the Advance Care Planning Navigator. This information is available in the [facility] [address] link in the patient's chart header. Primary Decision Maker ([address]):   Primary Decision Maker: Mercedez Roman - Child - [phone]    Resuscitation Status: DNR  If DNR is there a Durable DNR on file? : [x] Yes [] No (If no, complete Durable DNR)    HISTORY     History obtained from: Chart, hospice liaison, bedside nurse    CHIEF COMPLAINT: Altered mental status  The patient is:   [] Verbal  [] Nonverbal  [x] Unresponsive    HPI/SUBJECTIVE:  Patient is unresponsive. Difficult to have a full assessment but does show evidence of slight increased work of breathing and certainly audible secretions. 8/15: increased WOB    8/16patient minimally responsive but continues show evidence of increased work of breathing, accessory muscle use, significant secretions.      REVIEW OF SYSTEMS     The following systems were: [] reviewed  [x] unable to be reviewed    Positive ROS include:  Constitutional: fatigue, weakness, in pain, short of breath  Ears/nose/mouth/throat: increased airway secretions  Respiratory:shortness of breath, wheezing  Gastrointestinal:poor appetite, nausea, vomiting, abdominal pain, constipation, diarrhea  Musculoskeletal:pain, deformities, swelling legs  Neurologic:confusion, hallucinations, weakness  Psychiatric:anxiety, feeling depressed, poor sleep  Endocrine:     Adult Non-Verbal Pain Assessment Score: 5  Face  [] 0   No particular expression or smile  [x] 1   Occasional grimace, tearing, frowning, wrinkled forehead  [] 2   Frequent grimace, tearing, frowning, wrinkled forehead    Activity (movement)  [] 0   Lying quietly, normal position  [] 1   Seeking attention through movement or slow, cautious movement  [x] 2   Restless, excessive activity and/or withdrawal reflexes    Guarding  [] 0   Lying quietly, no positioning of hands over areas of body  [x] 1   Splinting areas of the body, tense  [] 2   Rigid, stiff    Physiology (vital signs)  [x] 0   Stable vital signs  [] 1   Change in any of the following: SBP > 20mm Hg; HR > 20/minute  [] 2   Change in any of the following: SBP > 30mm Hg; HR > 25/minute    Respiratory  [] 0   Baseline RR/SpO2, compliant with ventilator  [x] 1   RR > 10 above baseline, or 5% drop SpO2, mild asynchrony with ventilator  [] 2   RR > 20 above baseline, or 10% drop SpO2, asynchrony with ventilator     FUNCTIONAL ASSESSMENT     Palliative Performance Scale (PPS):10       PSYCHOSOCIAL/SPIRITUAL ASSESSMENT     Active Problems:    * No active hospital problems. *    Past Medical History:   Diagnosis Date    [facility] [address]     Seizure ([facility] [address]       No past surgical history on file. Social History     Tobacco Use    Smoking status: Former Smoker    Smokeless tobacco: Never Used   Substance Use Topics    Alcohol use: Not Currently     No family history on file.    No Known Allergies   Current Facility-Administered Medications   Medication Dose Route Frequency    HYDROmorphone (DILAUDID) injection 2 mg  2 mg IntraVENous Q2H    HYDROmorphone (DILAUDID) injection 2 mg  2 mg IntraVENous Q15MIN PRN    glycopyrrolate (ROBINUL) injection 0.2 mg  0.2 mg IntraVENous Q2H PRN    bisacodyL (DULCOLAX) suppository 10 mg  10 mg Rectal DAILY PRN    LORazepam (ATIVAN) injection 2 mg  2 mg IntraVENous Q2H    LORazepam (ATIVAN) injection 2 mg  2 mg IntraVENous Q15MIN PRN    LORazepam (ATIVAN) injection 4 mg  4 mg IntraVENous Q5MIN PRN    ketorolac (TORADOL) injection 30 mg  30 mg IntraVENous Q6H PRN    acetaminophen (TYLENOL) suppository 650 mg  650 mg Rectal Q6H PRN        PHYSICAL EXAM     Wt Readings from Last 3 Encounters:   08/10/21 79.5 kg (175 lb 4.3 oz)       Visit Vitals  BP (!) 70/40 (BP 1 Location: Right upper arm, BP Patient Position: At rest)   Pulse 90   Temp 97.8 °F (36.6 °C)   Resp (!) 6   SpO2 (!) 87%       Supplemental O2  [x] Yes  [] NO  Last bowel movement: Currently this patient has:  [x] Peripheral IV [] PICC  [] PORT [] ICD    [] Arora Catheter [] NG Tube   [] PEG Tube    [] Rectal Tube [] Drain  [] Other:     Constitutional: Ill-appearing, unresponsive, audible secretions  Eyes: Minimally reactive  ENMT: Dry  Cardiovascular: Slightly tachycardic  Respiratory:  Audible secretions, respiratory rate in the low 20s, accessory muscle use noted, increased work of breathing  Gastrointestinal: Soft  Musculoskeletal: Muscle wasting  Skin: Dry slightly scaly skin in the lower extremities  Neurologic: Unresponsive, nonverbal, unable to participate in exam  Psychiatric: Slightly restless  Other:       Pertinent Lab and or Imaging Tests:  Lab Results   Component Value Date/Time    Sodium 144 08/11/2021 04:40 AM    Sodium 145 08/11/2021 04:40 AM    Potassium 3.9 08/11/2021 04:40 AM    Potassium 3.9 08/11/2021 04:40 AM    Chloride 111 (H) 08/11/2021 04:40 AM    Chloride 111 (H) 08/11/2021 04:40 AM    CO2 29 08/11/2021 04:40 AM    CO2 30 08/11/2021 04:40 AM    Anion gap 4 (L) 08/11/2021 04:40 AM    Anion gap 4 (L) 08/11/2021 04:40 AM    Glucose 108 (H) 08/11/2021 04:40 AM    Glucose 108 (H) 08/11/2021 04:40 AM    BUN 21 (H) 08/11/2021 04:40 AM    BUN 21 (H) 08/11/2021 04:40 AM    Creatinine 0.68 (L) 08/11/2021 04:40 AM    Creatinine 0.69 (L) 08/11/2021 04:40 AM    BUN/Creatinine ratio 31 (H) 08/11/2021 04:40 AM    BUN/Creatinine ratio 30 (H) 08/11/2021 04:40 AM    GFR est AA >60 08/11/2021 04:40 AM    GFR est AA >60 08/11/2021 04:40 AM    GFR est non-AA >60 08/11/2021 04:40 AM    GFR est non-AA >60 08/11/2021 04:40 AM    Calcium 8.3 (L) 08/11/2021 04:40 AM    Calcium 8.4 (L) 08/11/2021 04:40 AM     Lab Results   Component Value Date/Time    Protein, total 6.4 08/11/2021 04:40 AM    Albumin 2.2 (L) 08/11/2021 04:40 AM    Albumin 2.2 (L) 08/11/2021 04:40 AM

## 2021-08-16 NOTE — PROGRESS NOTES
Problem: Pressure Injury - Risk of  Goal: *Prevention of pressure injury  Description: Document Eliud Scale and appropriate interventions in the flowsheet. Outcome: Progressing Towards Goal  Note: Pressure Injury Interventions:  Sensory Interventions: Check visual cues for pain, Minimize linen layers    Moisture Interventions: Absorbent underpads, Minimize layers    Activity Interventions: Pressure redistribution bed/mattress(bed type)    Mobility Interventions: Float heels, HOB 30 degrees or less    Nutrition Interventions:  (NPO)    Friction and Shear Interventions: Apply protective barrier, creams and emollients, Minimize layers                Problem: Falls - Risk of  Goal: *Absence of Falls  Description: Document Emily Fall Risk and appropriate interventions in the flowsheet.   Outcome: Progressing Towards Goal  Note: Fall Risk Interventions:  Mobility Interventions: Bed/chair exit alarm    Mentation Interventions: Adequate sleep, hydration, pain control, Bed/chair exit alarm    Medication Interventions: Bed/chair exit alarm    Elimination Interventions: Bed/chair exit alarm, Call light in reach              Problem: Infection - Risk of, Urinary Catheter-Associated Urinary Tract Infection  Goal: *Absence of infection signs and symptoms  Outcome: Progressing Towards Goal

## 2021-08-16 NOTE — PROGRESS NOTES
[facility]   Good Help to Those in Need  [phone]    Patient Name: Toni Victor  YOB: 1955    Date of Provider Hospice Visit: 8/15/21    Level of Care:   [x] General Inpatient (GIP)    [] Routine   [] Respite    Current Location of Care:  [] [facility] [] [facility] [] [facility] [] [facility] [x] Hospice House THE [facility], patient referred from:  [] [facility] [] [facility] [] [facility] [] [facility] [] Home [x] Other: [facility]    Date of Original Hospice Admission: 8/13/21  Hospice Medical Director at time of admission: [employer] Diagnosis: CVA  Diagnoses RELATED to the terminal prognosis: Acute hypoxic respiratory failurestatus post extubation, aspiration pneumonia, sepsis, history of seizures  Other Diagnoses:      HOSPICE SUMMARY     Toni Victor is a 76y.o. year old who was admitted to [facility]. Patient is a 25-year-old male who was admitted to [facility] on 8/4/2021 with acute cerebrovascular accident. MRI revealed acute infarction of the cerebellar hemispheres, nemesio, midbrain. Chest x-ray at admission showed extensive new left-sided infiltrate. Patient had been on prior ventilatory support. Patient treated aggressively with IV antibiotics, intubation but continued to show evidence of decline with minimal responsiveness. Family is elected to transition to comfort with the support of hospice. Patient transferred to the community hospice house secondary to [facility] level care needs and significant symptom burden. The patient's principle diagnosis has resulted in respiratory distress  Refer to LCD     Functionally, the patient's Karnofsky and/or Palliative Performance Scale has declined over a period of days to weeks and is estimated at 10 the patient is dependent on the following ADLs: All    Objective information that support this patients limited prognosis includes:   MRI the brain  IMPRESSION  1.   Restricted diffusion involving the cerebellar hemispheres, nemesio, and  possibly the left midbrain most likely representing acute ischemic infarcts  although the brainstem involvement may be partially artifactual. Consider a  short-term interval follow-up MRI. 2.  Attenuated vertebrobasilar flow voids which may be related to underlying  stenosis. CTA or MRA is recommended for further evaluation. 3.  Extensive multifocal encephalomalacia as described above    The patient/family chose comfort measures with the support of Hospice. HOSPICE DIAGNOSES   Active Symptoms:  1. Increased tracheal secretions  2. Respiratory distressstatus post extubation  3. History of seizures  4. Hospice care  5. Recent cerebrovascular accident     PLAN   1. Patient admitted to [facility] as he needs frequent nursing assessment, IV medication management that is not a to be done in the home setting, increased risk of rapid decline and not safe to transition home or attempt some legal medication. 2. Continue Ativan 2 mg IV every 2 hours scheduled. He also has seizure doses of 4 mg every 5 minutes as needed. 3. IV access lost this afternoon, morphine and ativan changed to subcutaneous.  (changed back to IV later tonight after IV access replaced)  4. Midnight: increase morphine to 6mg scheduled and prn.  5. Comfort meds for all other symptoms  6. Plan reviewed with bedside nurse. 7.  and SW to support family needs  8. Disposition: Likely will die at the hospice house  9. Hospice Plan of care was reviewed in detail and agree with current plan of care    Prognosis estimated based on 08/16/21 clinical assessment is:   [x] Hours to Days    [] Days to Weeks    [] Other:    Communicated plan of care with: Hospice Case Manager; Hospice IDT; Care Team     GOALS OF CARE     Patient/Medical POA stated Goal of Care: Hospice care    [x] I have reviewed and/or updated ACP information in the Advance Care Planning Navigator.  This information is available in the [employer] [address] link in the patient's chart header. Primary Decision Maker ([address]):   Primary Decision Maker: Laverne Caldwell - Kurt - [phone]    Resuscitation Status: DNR  If DNR is there a Durable DNR on file? : [x] Yes [] No (If no, complete Durable DNR)    HISTORY     History obtained from: Chart, hospice liaison, bedside nurse    CHIEF COMPLAINT: Altered mental status  The patient is:   [] Verbal  [] Nonverbal  [x] Unresponsive    HPI/SUBJECTIVE:  Patient is unresponsive. Difficult to have a full assessment but does show evidence of slight increased work of breathing and certainly audible secretions.   8/15: increased WOB     REVIEW OF SYSTEMS     The following systems were: [] reviewed  [x] unable to be reviewed    Positive ROS include:  Constitutional: fatigue, weakness, in pain, short of breath  Ears/nose/mouth/throat: increased airway secretions  Respiratory:shortness of breath, wheezing  Gastrointestinal:poor appetite, nausea, vomiting, abdominal pain, constipation, diarrhea  Musculoskeletal:pain, deformities, swelling legs  Neurologic:confusion, hallucinations, weakness  Psychiatric:anxiety, feeling depressed, poor sleep  Endocrine:     Adult Non-Verbal Pain Assessment Score: 5/10  Face  [] 0   No particular expression or smile  [x] 1   Occasional grimace, tearing, frowning, wrinkled forehead  [] 2   Frequent grimace, tearing, frowning, wrinkled forehead    Activity (movement)  [] 0   Lying quietly, normal position  [] 1   Seeking attention through movement or slow, cautious movement  [x] 2   Restless, excessive activity and/or withdrawal reflexes    Guarding  [] 0   Lying quietly, no positioning of hands over areas of body  [x] 1   Splinting areas of the body, tense  [] 2   Rigid, stiff    Physiology (vital signs)  [x] 0   Stable vital signs  [] 1   Change in any of the following: SBP > 20mm Hg; HR > 20/minute  [] 2   Change in any of the following: SBP > 30mm Hg; HR > 25/minute    Respiratory  [] 0 Baseline RR/SpO2, compliant with ventilator  [x] 1   RR > 10 above baseline, or 5% drop SpO2, mild asynchrony with ventilator  [] 2   RR > 20 above baseline, or 10% drop SpO2, asynchrony with ventilator     FUNCTIONAL ASSESSMENT     Palliative Performance Scale (PPS):10       PSYCHOSOCIAL/SPIRITUAL ASSESSMENT     Active Problems:    * No active hospital problems. *    Past Medical History:   Diagnosis Date    [facility] [address]     Seizure [facility] [address]       No past surgical history on file. Social History     Tobacco Use    Smoking status: Former Smoker    Smokeless tobacco: Never Used   Substance Use Topics    Alcohol use: Not Currently     No family history on file.    No Known Allergies   Current Facility-Administered Medications   Medication Dose Route Frequency    morphine injection 4 mg  4 mg SubCUTAneous Q2H    morphine injection 4 mg  4 mg SubCUTAneous Q15MIN PRN    bisacodyL (DULCOLAX) suppository 10 mg  10 mg Rectal DAILY PRN    LORazepam (ATIVAN) injection 2 mg  2 mg IntraVENous Q2H    LORazepam (ATIVAN) injection 2 mg  2 mg IntraVENous Q15MIN PRN    LORazepam (ATIVAN) injection 4 mg  4 mg IntraVENous Q5MIN PRN    glycopyrrolate (ROBINUL) injection 0.2 mg  0.2 mg IntraVENous Q4H PRN    ketorolac (TORADOL) injection 30 mg  30 mg IntraVENous Q6H PRN    acetaminophen (TYLENOL) suppository 650 mg  650 mg Rectal Q6H PRN        PHYSICAL EXAM     Wt Readings from Last 3 Encounters:   08/10/21 79.5 kg (175 lb 4.3 oz)       Visit Vitals  BP (!) 68/42 (BP 1 Location: Left arm, BP Patient Position: At rest)   Pulse 85   Temp 98.4 °F (36.9 °C)   Resp 11   SpO2 90%       Supplemental O2  [x] Yes  [] NO  Last bowel movement:     Currently this patient has:  [x] Peripheral IV [] PICC  [] PORT [] ICD    [] Arora Catheter [] NG Tube   [] PEG Tube    [] Rectal Tube [] Drain  [] Other:     Constitutional: Ill-appearing, unresponsive, audible secretions  Eyes: Minimally reactive  ENMT: Dry  Cardiovascular: Slightly tachycardic  Respiratory:  Audible secretions, tachy rate with accessory muscle use  Gastrointestinal: Soft  Musculoskeletal: Muscle wasting  Skin: Dry slightly scaly skin in the lower extremities  Neurologic: Unresponsive, nonverbal, unable to participate in exam  Psychiatric: Slightly restless  Other:       Pertinent Lab and or Imaging Tests:  Lab Results   Component Value Date/Time    Sodium 144 08/11/2021 04:40 AM    Sodium 145 08/11/2021 04:40 AM    Potassium 3.9 08/11/2021 04:40 AM    Potassium 3.9 08/11/2021 04:40 AM    Chloride 111 (H) 08/11/2021 04:40 AM    Chloride 111 (H) 08/11/2021 04:40 AM    CO2 29 08/11/2021 04:40 AM    CO2 30 08/11/2021 04:40 AM    Anion gap 4 (L) 08/11/2021 04:40 AM    Anion gap 4 (L) 08/11/2021 04:40 AM    Glucose 108 (H) 08/11/2021 04:40 AM    Glucose 108 (H) 08/11/2021 04:40 AM    BUN 21 (H) 08/11/2021 04:40 AM    BUN 21 (H) 08/11/2021 04:40 AM    Creatinine 0.68 (L) 08/11/2021 04:40 AM    Creatinine 0.69 (L) 08/11/2021 04:40 AM    BUN/Creatinine ratio 31 (H) 08/11/2021 04:40 AM    BUN/Creatinine ratio 30 (H) 08/11/2021 04:40 AM    GFR est AA >60 08/11/2021 04:40 AM    GFR est AA >60 08/11/2021 04:40 AM    GFR est non-AA >60 08/11/2021 04:40 AM    GFR est non-AA >60 08/11/2021 04:40 AM    Calcium 8.3 (L) 08/11/2021 04:40 AM    Calcium 8.4 (L) 08/11/2021 04:40 AM     Lab Results   Component Value Date/Time    Protein, total 6.4 08/11/2021 04:40 AM    Albumin 2.2 (L) 08/11/2021 04:40 AM    Albumin 2.2 (L) 08/11/2021 04:40 AM

## 2021-08-16 NOTE — PROGRESS NOTES
Problem: Pressure Injury - Risk of  Goal: *Prevention of pressure injury  Description: Document Eliud Scale and appropriate interventions in the flowsheet.   Outcome: Not Progressing Towards Goal  Note: Pressure Injury Interventions:  Sensory Interventions: Assess changes in LOC, Avoid rigorous massage over bony prominences, Check visual cues for pain, Float heels, Keep linens dry and wrinkle-free, Minimize linen layers    Moisture Interventions: Absorbent underpads, Apply protective barrier, creams and emollients    Activity Interventions: Pressure redistribution bed/mattress(bed type)    Mobility Interventions: Float heels, HOB 30 degrees or less    Nutrition Interventions: Document food/fluid/supplement intake, Offer support with meals,snacks and hydration    Friction and Shear Interventions: Apply protective barrier, creams and emollients, Foam dressings/transparent film/skin sealants, HOB 30 degrees or less, Minimize layers                Problem: Pain  Goal: Assess satisfaction of level of comfort and symptom control  Outcome: Not Progressing Towards Goal     Problem: Breathing Pattern - Ineffective  Goal: *Use of effective breathing techniques  Outcome: Not Progressing Towards Goal

## 2021-08-17 NOTE — HOSPICE
LCSW met with patient at bedside. He was received lying in hospital bed, unresponsive to sound or touch and apneic breathing noted. No family present at time of visit, LCSW placed call to pt's son to offer support. Son shared he was in last night and appreciative of time to visit with patient, says he is holding up OK at this time. LCSW offered ongoing support as needed.     KATELYN Perez, [facility]   [phone]

## 2021-08-17 NOTE — HOSPICE
IDG rounds today. Following rounds visited with Mr Cora Angel after calling his son. His son noted that this is a difficult time but he is okay at this time. 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 He noted that his father has a strong jeet. As a yearly practice/discipline his father would read through the bible. He felt prayer, scripture and music would be good interventions for his father. Music is playing in his room. Interventions: Prayer, Scripture     Goal for next to weeks: Continued visits to offer prayer and scripture reading.

## 2021-08-17 NOTE — PROGRESS NOTES
[address] [place] [address]   Good Help to Those in Need  [phone]    Patient Name: Michael Burkett  YOB: 1955    Date of Provider Hospice Visit: 8/15/21    Level of Care:   [x] General Inpatient (GIP)    [] Routine   [] Respite    Current Location of Care:  [] [address] [place] [address] [] [facility] [] [facility] [] [facility] [x] Hospice House THE [facility], patient referred from:  [] [address] [place] [address] [] [facility] [] [facility] [] [facility] [] Home [x] Other: [facility]    Date of Original Hospice Admission: 8/13/21  Hospice Medical Director at time of admission: [place] Diagnosis: CVA  Diagnoses RELATED to the terminal prognosis: Acute hypoxic respiratory failurestatus post extubation, aspiration pneumonia, sepsis, history of seizures  Other Diagnoses:      HOSPICE SUMMARY     Michael Burkett is a 76y.o. year old who was admitted to [address] [place] [address]. Patient is a 58-year-old male who was admitted to [facility] on 8/4/2021 with acute cerebrovascular accident. MRI revealed acute infarction of the cerebellar hemispheres, nemesio, midbrain. Chest x-ray at admission showed extensive new left-sided infiltrate. Patient had been on prior ventilatory support. Patient treated aggressively with IV antibiotics, intubation but continued to show evidence of decline with minimal responsiveness. Family is elected to transition to comfort with the support of hospice. Patient transferred to the community hospice house secondary to [facility] level care needs and significant symptom burden. The patient's principle diagnosis has resulted in respiratory distress  Refer to LCD     Functionally, the patient's Karnofsky and/or Palliative Performance Scale has declined over a period of days to weeks and is estimated at 10 the patient is dependent on the following ADLs: All    Objective information that support this patients limited prognosis includes:   MRI the brain  IMPRESSION  1.   Restricted diffusion involving the cerebellar hemispheres, nemesio, and  possibly the left midbrain most likely representing acute ischemic infarcts  although the brainstem involvement may be partially artifactual. Consider a  short-term interval follow-up MRI. 2.  Attenuated vertebrobasilar flow voids which may be related to underlying  stenosis. CTA or MRA is recommended for further evaluation. 3.  Extensive multifocal encephalomalacia as described above    The patient/family chose comfort measures with the support of Hospice. HOSPICE DIAGNOSES   Active Symptoms:  1. Increased tracheal secretions  2. Respiratory distressstatus post extubation  3. History of seizures  4. Hospice care  5. Recent cerebrovascular accident     PLAN   1. Patient will continue GIP level care as he needs frequent nursing assessment and required multiple adjustments in medication. Patient showing long periods of apnea45 to 60 seconds at a time. Adjustments in his opioids yesterday has been helpful as he appears much more comfortable. His son was able to visit last night and said his goodbyes. 2. Continue Ativan 2 mg IV every 2 hours scheduled. He also has seizure doses of 4 mg every 5 minutes as needed. 3. Continue Dilaudid 2 mg IV every 2 hours scheduled every 15 minutes as needed  4. Comfort meds for all other symptoms  5. Plan reviewed with bedside nurse. 6.  and SW to support family needs  7. Disposition: Likely will die at the hospice house  8. Hospice Plan of care was reviewed in detail and agree with current plan of care    Prognosis estimated based on 08/17/21 clinical assessment is:   [x] Hours to Days    [] Days to Weeks    [] Other:    Communicated plan of care with: Hospice Case Manager; Hospice IDT; Care Team     GOALS OF CARE     Patient/Medical POA stated Goal of Care: Hospice care    [x] I have reviewed and/or updated ACP information in the Advance Care Planning Navigator.  This information is available in the [facility] [address] link in the patient's chart header. Primary Decision Maker ([address]):   Primary Decision Maker: John Aguilar - Child - [phone]    Resuscitation Status: DNR  If DNR is there a Durable DNR on file? : [x] Yes [] No (If no, complete Durable DNR)    HISTORY     History obtained from: Chart, hospice liaison, bedside nurse    CHIEF COMPLAINT: Altered mental status  The patient is:   [] Verbal  [] Nonverbal  [x] Unresponsive    HPI/SUBJECTIVE:  Patient is unresponsive. Difficult to have a full assessment but does show evidence of slight increased work of breathing and certainly audible secretions. 8/15: increased WOB    8/16patient minimally responsive but continues show evidence of increased work of breathing, accessory muscle use, significant secretions. 8/17-patient is minimally responsive. No evidence of work of breathing. Finally appears more comfortable.      REVIEW OF SYSTEMS     The following systems were: [] reviewed  [x] unable to be reviewed    Positive ROS include:  Constitutional: fatigue, weakness, in pain, short of breath  Ears/nose/mouth/throat: increased airway secretions  Respiratory:shortness of breath, wheezing  Gastrointestinal:poor appetite, nausea, vomiting, abdominal pain, constipation, diarrhea  Musculoskeletal:pain, deformities, swelling legs  Neurologic:confusion, hallucinations, weakness  Psychiatric:anxiety, feeling depressed, poor sleep  Endocrine:     Adult Non-Verbal Pain Assessment Score: 1  Face  [] 0   No particular expression or smile  [x] 1   Occasional grimace, tearing, frowning, wrinkled forehead  [] 2   Frequent grimace, tearing, frowning, wrinkled forehead    Activity (movement)  [x] 0   Lying quietly, normal position  [] 1   Seeking attention through movement or slow, cautious movement  [] 2   Restless, excessive activity and/or withdrawal reflexes    Guarding  [x] 0   Lying quietly, no positioning of hands over areas of body  [] 1   Splinting areas of the body, tense  [] 2 Rigid, stiff    Physiology (vital signs)  [x] 0   Stable vital signs  [] 1   Change in any of the following: SBP > 20mm Hg; HR > 20/minute  [] 2   Change in any of the following: SBP > 30mm Hg; HR > 25/minute    Respiratory  [x] 0   Baseline RR/SpO2, compliant with ventilator  [] 1   RR > 10 above baseline, or 5% drop SpO2, mild asynchrony with ventilator  [] 2   RR > 20 above baseline, or 10% drop SpO2, asynchrony with ventilator     FUNCTIONAL ASSESSMENT     Palliative Performance Scale (PPS):10       PSYCHOSOCIAL/SPIRITUAL ASSESSMENT     Active Problems:    * No active hospital problems. *    Past Medical History:   Diagnosis Date    [facility] [address]     Seizure ([facility] [address]       No past surgical history on file. Social History     Tobacco Use    Smoking status: Former Smoker    Smokeless tobacco: Never Used   Substance Use Topics    Alcohol use: Not Currently     No family history on file.    No Known Allergies   Current Facility-Administered Medications   Medication Dose Route Frequency    HYDROmorphone (DILAUDID) injection 2 mg  2 mg IntraVENous Q2H    HYDROmorphone (DILAUDID) injection 2 mg  2 mg IntraVENous Q15MIN PRN    glycopyrrolate (ROBINUL) injection 0.2 mg  0.2 mg IntraVENous Q2H PRN    bisacodyL (DULCOLAX) suppository 10 mg  10 mg Rectal DAILY PRN    LORazepam (ATIVAN) injection 2 mg  2 mg IntraVENous Q2H    LORazepam (ATIVAN) injection 2 mg  2 mg IntraVENous Q15MIN PRN    LORazepam (ATIVAN) injection 4 mg  4 mg IntraVENous Q5MIN PRN    ketorolac (TORADOL) injection 30 mg  30 mg IntraVENous Q6H PRN    acetaminophen (TYLENOL) suppository 650 mg  650 mg Rectal Q6H PRN        PHYSICAL EXAM     Wt Readings from Last 3 Encounters:   08/10/21 79.5 kg (175 lb 4.3 oz)       Visit Vitals  BP (!) 60/36 (BP 1 Location: Right upper arm, BP Patient Position: Lying left side)   Pulse 85   Temp 99.1 °F (37.3 °C)   Resp (!) 4   SpO2 (!) 70%       Supplemental O2  [x] Yes  [] NO  Last bowel movement:     Currently this patient has:  [x] Peripheral IV [] PICC  [] PORT [] ICD    [] Arora Catheter [] NG Tube   [] PEG Tube    [] Rectal Tube [] Drain  [] Other:     Constitutional: Ill-appearing, unresponsive, secretions are less  Eyes: Minimally reactive  ENMT: Dry  Cardiovascular: Slightly tachycardic  Respiratory: Less secretions, respirations around 4-6, no accessory muscle use, no work of breathing  Gastrointestinal: Soft  Musculoskeletal: Muscle wasting  Skin: Dry slightly scaly skin in the lower extremities  Neurologic: Unresponsive,   Psychiatric: Calm  Other:       Pertinent Lab and or Imaging Tests:  Lab Results   Component Value Date/Time    Sodium 144 08/11/2021 04:40 AM    Sodium 145 08/11/2021 04:40 AM    Potassium 3.9 08/11/2021 04:40 AM    Potassium 3.9 08/11/2021 04:40 AM    Chloride 111 (H) 08/11/2021 04:40 AM    Chloride 111 (H) 08/11/2021 04:40 AM    CO2 29 08/11/2021 04:40 AM    CO2 30 08/11/2021 04:40 AM    Anion gap 4 (L) 08/11/2021 04:40 AM    Anion gap 4 (L) 08/11/2021 04:40 AM    Glucose 108 (H) 08/11/2021 04:40 AM    Glucose 108 (H) 08/11/2021 04:40 AM    BUN 21 (H) 08/11/2021 04:40 AM    BUN 21 (H) 08/11/2021 04:40 AM    Creatinine 0.68 (L) 08/11/2021 04:40 AM    Creatinine 0.69 (L) 08/11/2021 04:40 AM    BUN/Creatinine ratio 31 (H) 08/11/2021 04:40 AM    BUN/Creatinine ratio 30 (H) 08/11/2021 04:40 AM    GFR est AA >60 08/11/2021 04:40 AM    GFR est AA >60 08/11/2021 04:40 AM    GFR est non-AA >60 08/11/2021 04:40 AM    GFR est non-AA >60 08/11/2021 04:40 AM    Calcium 8.3 (L) 08/11/2021 04:40 AM    Calcium 8.4 (L) 08/11/2021 04:40 AM     Lab Results   Component Value Date/Time    Protein, total 6.4 08/11/2021 04:40 AM    Albumin 2.2 (L) 08/11/2021 04:40 AM    Albumin 2.2 (L) 08/11/2021 04:40 AM

## 2021-08-17 NOTE — HOSPICE
1900: report received from [place], [place]  0536: assessment completed- see flowsheets. Pt is unresponsive with long periods of apnea noted  1950: Scheduled IV ativan and IV dilaudid given. PRN Robinul given for secretions and PRN toradol given for temp of 100.2. PT son called for update- update provided, informed son pt appears imminent and could pass away at any time but was very comfortable, he was appreciative of update. 2040: pt bathed in bed. Turned onto R side   2130: increased work of breathing noted- PRN IV dilaudid given  2145: pt son at bedside, tearful but grieving appropriately. PRn Dilaudid effective pt resting with relaxed facial expression  2215: scheduled IV medications given. Son remains at bedside, RN reviewed EOL symptoms and treatment with him- he was appreciative   18: pt son left the bedside, was tearful but accepting pt decline. Son reports he has given pt permission to let go  [id number]: pt turned onto L side  0050: mouth care completed. Pt continues with periods of apnea now lasting 20 seconds  0140: RR 12 with periods of apnea and audible secretions  0235: pt resting quietly with eyes closed. Periods of apnea last 30 seconds. Scheduled IV ativan and dilaudid given  0330: pt resting quietly with eyes closed, Pt turned onto L side  0430: pt showing slight increased work of breathing with some accessory muscle use, RR8 with periods of apnea lasting 20-25 seconds. Scheduled IV Dilaudid and IV ativan given- will monitor closely   0505: pt continues with increased work of breathing. PRN IV dilaudid and IV ativan given  0520: temp 99.7, toradol given. PRN ativan and dilaudid somewhat effective will monitor pt closely  0625: respirations are less labored, pt continues with periods of apnea. RR 7.  Scheduled IV medications given  0645: pt is relaxed, respirations are unlabored  0700: report given to oncoming nurse      NAME OF PATIENT:  Babar Don    LEVEL OF CARE: GIP    REASON FOR GIP:   Pain, despite numerous changes in medications, Unmanageable respiratory distress, Medication adjustment that must be monitored 24/7 and Stabilizing treatment that cannot take place at home    PATIENT REMAINS ELIGIBLE FOR GIP LEVEL OF CARE AS EVIDENCED BY: pt requires scheduled and PRN IV medications to manage symptoms and frequent nursing assessments.  Pt too unstable for transport    REASON FOR RESPITE:  n/a    O2 SAFETY:  na/    FALL INTERVENTIONS PROVIDED:   Implemented/recommended resources for alarm system (personal alarm, bed alarm, call bell, etc.) , Implemented/recommended environmental changes (remove hazards, lower bed, improve lighting, etc.) and Implemented/recommended increased supervision/assistance    INTERDISPLINARY COMMUNICATION/COLLABORATION:  Physician, MSW, [place] and RN, CNA    NEW MEDICATION INITIATION DOCUMENTATION:  n/a    Reason medication is being initiated: n/a    MD / Provider name consulted re: change in status / initiation of new medication: n/a    New Symptom(s): n/a    New Order(s):  N/a    Name of the person notified of the changes: n/a    Name of person being taught: n/a    Instructions given: n/a    Side Effects taught: n/a    Response to teaching: n/a      COMFORTABLE DYING MEASURE:  Is Patient/family satisfied with symptom level?  yes    DISCHARGE PLAN:  Pt will likely pass at [facility], if he stabilizes placement will be found for him

## 2021-08-17 NOTE — [facility] IDG CHAPLAIN NOTES
Patient: Cally Aguilar    Date: 08/17/21  Time: 11:45 AM    [facility] in [place]  Notes:  Participated in rounds today. Following rounds today Visited with Mr Ruel Herzog who was lying in bed. He did not respond to the . I called his son, who shared he and his father are very close. He acknowledges his grief but he is coping. Interventions: prayer and scripture reading as was suggested by his son, supportive listening to his son. Goal for next two weeks: Continued visits to support Mr Ruel Herzog with prayer and scripture reading. Reach out to son, via phone if needed to continue grief support. Signed by:  Fatmata Broderick

## 2021-08-17 NOTE — PROGRESS NOTES
Initial visit to provide comfort and spiritual guidance to the patient, family and caregiver as they make their journey towards end of life. Upon entry, patient was actively dying, non-responsive and nonverbal. No family present. Offered empathic presence and comforting words around the theme of letting go and affirmed potential for healing and wholeness in the dying process. Offered generic commendation prayer as patient nears end of life journey.

## 2021-08-17 NOTE — PROGRESS NOTES
..  0700:Report received from [facility] using SBAR I/O and Kardex. Pt remain unresponsive with labored breathing and 45 sec apnea audible secretions noted. 08:12:Adm schedule Dilaudid and ativan with PRN Robinul.  08:45:Pt respositioned for comfort suctioning with mouth care done yellow secretions noted. 09:15:Med show some effectieness. 10:39:Adm schedule dilaudid and ativan with PRN Robinul. 11:15:Mayte visited offered prayers, pt remains comfortable from schedule dose, continue to monitor. 11:47:Pt cease breathing no chest rising no palpable pulse, pronounce by Braden Paulino. 12:53:Notify son who stated he was on his way. 13:50:Son called stated he had to reach family and he is about to leave. 18:30: home arrived.   NAME OF PATIENT:Colin Stafford     LEVEL OF CARE: GIP  REASON FOR GIP: Pain resp distress and increase secretions.     *PATIENT REMAINS ELIGIBLE FOR GIP LEVEL OF CARE AS EVIDENCED BY: Pain resp distress and increase secretions.    REASON FOR RESPITE:n/a       O2 SAFETY:  Concentrator positioning (6\" from furniture/drapes), Tanks stored in [place] , No petroleum based products on face while oxygen in use and Oxygen sign on the door     FALL INTERVENTIONS PROVIDED:   Implemented/recommended use of fall risk identification flag to all team members, Implemented/recommended assistive devices and encouraged their use, Implemented/recommended resources for alarm system (personal alarm, bed alarm, call bell, etc.)  and Implemented/recommended environmental changes (remove hazards, lower bed, improve lighting, etc.)     INTERDISPLINARY COMMUNICATION/COLLABORATION:  Physician, MSW, [place] and RN, CNA     NEW MEDICATION INITIATION DOCUMENTATION:N/A      Reason medication is being initiated:  N/A     MD / Provider name consulted re: change in status / initiation of new medication:  N/A     New Symptom(s):  N/A     New Order(s):  N/A     Name of the person notified of the changes:  N/A     Name of person being taught:  N/A     Instructions given:  N/A     Side Effects taught:  N/A     Response to teaching:  N/A        COMFORTABLE DYING MEASURE:  Is Patient/family satisfied with symptom level?  yes     DISCHARGE PLAN:Pt will remain at [facility]. He passes.

## 2021-08-17 NOTE — [facility] IDG SOCIAL WORKER NOTES
[address] [place] [address] note:     LCSW participated in [address] rounds with MD, RN, RN manager and . LCSW met with patient at bedside. He was received lying in hospital bed, unresponsive to sound or touch and apneic breathing noted. No family present at time of visit, LCSW placed call to pt's son to offer support. Son shared he was in last night and appreciative of time to visit with patient, says he is holding up OK at this time. LCSW offered ongoing support as needed.   Problem: Support deficit at EOL  Plan: Continue to offer bedside and virtual support  Community Resources: [facility] for [facility] care  Advance Directives: Son, Donna Davies is [facility]  DDNR: Completed and on file   Home: Juan Carlos Archibald [facility] [address] [place] [address]'S [facility]    [phone]

## 2021-08-17 NOTE — PROGRESS NOTES
Problem: Pressure Injury - Risk of  Goal: *Prevention of pressure injury  Description: Document Eliud Scale and appropriate interventions in the flowsheet. Outcome: Progressing Towards Goal  Note: Pressure Injury Interventions:  Sensory Interventions: Float heels, Minimize linen layers, Check visual cues for pain    Moisture Interventions: Absorbent underpads, Minimize layers    Activity Interventions: Pressure redistribution bed/mattress(bed type)    Mobility Interventions: Float heels, HOB 30 degrees or less    Nutrition Interventions: Document food/fluid/supplement intake    Friction and Shear Interventions: Apply protective barrier, creams and emollients, HOB 30 degrees or less, Minimize layers                Problem: Patient Education: Go to Patient Education Activity  Goal: Patient/Family Education  Outcome: Progressing Towards Goal     Problem: Falls - Risk of  Goal: *Absence of Falls  Description: Document Emily Fall Risk and appropriate interventions in the flowsheet.   Outcome: Progressing Towards Goal  Note: Fall Risk Interventions:  Mobility Interventions: Bed/chair exit alarm    Mentation Interventions: Adequate sleep, hydration, pain control, Bed/chair exit alarm, Door open when patient unattended    Medication Interventions: Bed/chair exit alarm    Elimination Interventions: Bed/chair exit alarm              Problem: Pain  Goal: Assess satisfaction of level of comfort and symptom control  Outcome: Progressing Towards Goal

## 2021-08-18 NOTE — HOSPICE
[address] Bereavement/Condolence Call: This LMSW called pt's son Jeannie Barger. to offer condolences and support. Jeannie Barger reported he was doing okay. He is currently handling pt's  arrangements. No needs at this time. LMSW offered Lola Gamino information for ongoing support.        Arnaud Real,  [place]    [phone]

## 2022-05-07 NOTE — PROGRESS NOTES
Consult  Pulmonary, Critical Care    Name: Eran Galdamez MRN: [id number]   : 1955 Hospital: [address] [place] [address]   Date: 2021  Admission date: 2021 Hospital Day: 4       Subjective/Interval History:   Patient admitted last night with altered mental status has remained unresponsive today. Some notes mention that he had vomiting at home before admission. His admission chest x-ray was relatively clear. He did go to MRI today came back to the floor Dr. Jeannette Granados saw him at that time he had agonal respirations and anesthesia was called to intubate him. Chest x-ray shows extensive new left-sided infiltrate he is currently on the ventilator has been transferred to the ICU. Dr. Jeannette Granados asked for me to see him for vent management     remains on the ventilator mildly sedated on propofol 10 mics. He has spontaneous respirations  / remains on the ventilator reinstituted propofol due to cough and discoordination with the ventilator. He has self respirations    Patient Active Problem List   Diagnosis Code    Altered mental status R41.82    Dysarthria R47.1    AMS (altered mental status) R41.82       IMPRESSION:   1. Acute hypoxic respiratory failure remains on mechanical ventilator  2. Aspiration pneumonia extensive left lung improving radiographically  3. Hypotension questionably sepsis resolved  4. Extensive acute CVA  cerebellar and brainstem seen on MRI remains unresponsive  5. Acute kidney injury resolved creatinine now normal  6. Elevated troponin probably acute myocardial infarct from anoxia  7. Hypophosphatemia to be repleted  8. Hypocalcemia repleated  9. History seizure disorder  10. History intracranial aneurysm repair years ago  6. Chronic left hemiparesis  Body mass index is 24.13 kg/m². 12.       RECOMMENDATIONS/PLAN:   1. Have decreased baseline ventilatory rate and he has spontaneous respiration  2. Continue IV Zosyn  3.  Replace phosphorus  4.   5. Continue IV fluids  6.   7.          Subjective/Initial History:       I was asked by Gomez Cochran MD to see Andrew Brasher a 76 y.o.  male  in consultation for a chief complaint of acute hypoxic respiratory failure aspiration pneumonia      The patient is unable to give any meaningful history or review of systems due to patient factors. Pt now in CCU. Patient PCP: None  PMH:  has a past medical history of CVA (cerebral vascular accident) ([facility] [address] and Seizure ([address] PSH:   has no past surgical history on file. FHX: family history is not on file. SHX:  reports that he has quit smoking. He has never used smokeless tobacco. He reports previous alcohol use. He reports that he does not use drugs.     Systemic review unobtainable as patient is unresponsive on the ventilator    No Known Allergies   MEDS:   Current Facility-Administered Medications   Medication    glycopyrrolate (ROBINUL) tablet 1 mg    zinc oxide-white petrolatum 17-57 % topical paste    levETIRAcetam (KEPPRA) 500 mg in saline (iso-osm) 100 mL IVPB (premix)    metoclopramide HCl (REGLAN) injection 10 mg    0.9% sodium chloride infusion    acetaminophen (TYLENOL) tablet 650 mg    Or    acetaminophen (TYLENOL) suppository 650 mg    polyethylene glycol (MIRALAX) packet 17 g    ondansetron (ZOFRAN ODT) tablet 4 mg    Or    ondansetron (ZOFRAN) injection 4 mg    enoxaparin (LOVENOX) injection 40 mg    aspirin tablet 325 mg    atorvastatin (LIPITOR) tablet 40 mg    piperacillin-tazobactam (ZOSYN) 3.375 g in 0.9% sodium chloride (MBP/ADV) 100 mL MBP    propofol (DIPRIVAN) 10 mg/mL infusion    dexamethasone (DECADRON) 4 mg/mL injection 4 mg    NOREPINephrine (LEVOPHED) 8 mg in 5% dextrose 250mL (32 mcg/mL) infusion        Current Facility-Administered Medications:     glycopyrrolate (ROBINUL) tablet 1 mg, 1 mg, Oral, BID, Angelica Jonas MD, 1 mg at 08/07/21 0813    zinc oxide-white petrolatum 17-57 % topical paste, , Topical, TID, Alex Berger MD, Given at 08/07/21 0814    levETIRAcetam (KEPPRA) 500 mg in saline (iso-osm) 100 mL IVPB (premix), 500 mg, IntraVENous, Q12H, Lupis Taylor MD, 500 mg at 08/07/21 0600    metoclopramide HCl (REGLAN) injection 10 mg, 10 mg, IntraVENous, Q4H PRN, Gene Taylor MD    0.9% sodium chloride infusion, 100 mL/hr, IntraVENous, CONTINUOUS, Lupis Taylor MD, Last Rate: 100 mL/hr at 08/06/21 1803, 100 mL/hr at 08/06/21 1803    acetaminophen (TYLENOL) tablet 650 mg, 650 mg, Oral, Q6H PRN, 650 mg at 08/06/21 2110 **OR** acetaminophen (TYLENOL) suppository 650 mg, 650 mg, Rectal, Q6H PRN, Gene Taylor MD    polyethylene glycol (MIRALAX) packet 17 g, 17 g, Oral, DAILY PRN, Gene Taylor MD    ondansetron (ZOFRAN ODT) tablet 4 mg, 4 mg, Oral, Q8H PRN **OR** ondansetron (ZOFRAN) injection 4 mg, 4 mg, IntraVENous, Q6H PRN, Lupis Taylor MD    enoxaparin (LOVENOX) injection 40 mg, 40 mg, SubCUTAneous, DAILY, Lupis Taylor MD, 40 mg at 08/07/21 0813    aspirin tablet 325 mg, 325 mg, Oral, DAILY, Lupis Taylor MD, 325 mg at 08/07/21 0813    atorvastatin (LIPITOR) tablet 40 mg, 40 mg, Oral, QHS, Lupis Taylor MD, 40 mg at 08/06/21 2110    piperacillin-tazobactam (ZOSYN) 3.375 g in 0.9% sodium chloride (MBP/ADV) 100 mL MBP, 3.375 g, IntraVENous, Q8H, Lupis Taylor MD, Last Rate: 200 mL/hr at 08/07/21 0424, 3.375 g at 08/07/21 0424    propofol (DIPRIVAN) 10 mg/mL infusion, 0-50 mcg/kg/min, IntraVENous, TITRATE, Meet Gil MD, Last Rate: 2.4 mL/hr at 08/07/21 0426, 6 mcg/kg/min at 08/07/21 0426    dexamethasone (DECADRON) 4 mg/mL injection 4 mg, 4 mg, IntraVENous, Q6H, Meet Gil MD, 4 mg at 08/07/21 0600    NOREPINephrine (LEVOPHED) 8 mg in 5% dextrose 250mL (32 mcg/mL) infusion, 10 mcg/min, IntraVENous, TITRATE, Gene Taylor MD, Stopped at 08/05/21 1111      Objective:     Vital Signs: Telemetry:    normal sinus rhythm Intake/Output: Visit Vitals  /71 (BP 1 Location: Left upper arm, BP Patient Position: At rest)   Pulse 82   Temp 99.2 °F (37.3 °C)   Resp 19   Ht 5' 5\" (1.651 m)   Wt 65.8 kg (145 lb)   SpO2 100%   BMI 24.13 kg/m²       Temp (24hrs), Av °F (37.8 °C), Min:97 °F (36.1 °C), Max:101.8 °F (38.8 °C)        O2 Device: Ventilator           Body mass index is 24.13 kg/m². Wt Readings from Last 4 Encounters:   21 65.8 kg (145 lb)          Intake/Output Summary (Last 24 hours) at 2021 0850  Last data filed at 2021 0700  Gross per 24 hour   Intake 2273.02 ml   Output 1100 ml   Net 1173.02 ml       Last shift:      No intake/output data recorded. Last 3 shifts:  1901 -  0700  In: 2836.9 [I.V.:1431.9]  Out: 1700 [Urine:1700]       Hemodynamics:    CO:    CI:    CVP:    SVR:   PAP Systolic:    PAP Diastolic:    PVR:    GB96:       Ventilator Settings:      Mode Rate TV Press PEEP FiO2 PIP Min. Vent   Assist control, Volume control    450 ml    5 cm H20 50 %  17 cm H2O  8.31 l/min      Physical Exam:    General:  male; unresponsive on the ventilator   HEENT: NCAT, ET tube in place  Eyes: anicteric; conjunctiva clear no doll's eye reflex eyes are disconjugate  Neck: no nodes, no cuff leak, no accessory MM use. No JVD  Chest: no deformity,   Cardiac: Regular rate and rhythm  Lungs: Has spontaneous respiration relatively clear right anterior lateral and posterior, better breath sounds over the left chest with posterior rales   Abd: Soft a few bowel sounds no grimacing to palpation  Ext: no edema; no joint swelling; No clubbing  : clear urine  Neuro:  On the ventilator on propofol unresponsive no doll's eye reflex flaccid extremities  Psych-  unable to assess  Skin: warm, dry, no cyanosis;   Pulses: Brachial and radial pulses intact  Capillary: Normal capillary refill      Labs:    Recent Labs     21  0301 21  0414 21  0415   WBC 18.3* 24.5* 13.3*   HGB 12.1 13.0 14.5   PLT 128* 147* 176     Recent Labs     08/07/21  0301 08/06/21  0414 08/05/21  0102 08/04/21 1925     143 141  140 137  136  --    K 3.8  3.8 3.9  3.9 3.9  3.8  --    *  113* 110*  110* 108  107  --    CO2 25  25 24  25 19*  23  --    *  123* 103*  102* 119*  125*  --    BUN 23*  24* 29*  29* 22*  22*  --    CREA 0.80  0.77 1.13  1.15 1.32*  1.29  --    CA 7.8*  7.8* 8.0*  8.0* 6.1*  8.3*  --    MG  --  2.0 1.9  --    PHOS 1.9* 2.9 1.4*  --    LAC  --   --   --  5.5*   ALB 2.2*  2.2* 2.6*  2.6* 2.7*  2.7*  --    ALT 79* 61 <6*  --        Recent Labs     08/06/21  0414 08/05/21  0102 08/04/21 1925   TROIQ 1.61* 1.38* 0.38*       Lab Results   Component Value Date/Time    Culture result: No growth 1 day 08/05/2021 01:02 AM    Culture result: Normal respiratory jesus 08/04/2021 10:30 PM    Culture result: No Growth (<1000 cfu/mL) 08/04/2021 06:41 PM   Urine no growth  Sputum rare polys with normal jesus  Blood no growth  Nasal MRSA smear negative    Imaging:  I have personally reviewed the patients radiographs and have reviewed the reports:    CXR Results  (Last 48 hours)  8/7 chest x-ray ET and NG tube in place extensive left infiltrate with left base better inflation slowly clearing  8/6 chest x-ray ET NG tube in place extensive left infiltrate mildly improved  8/4 chest x-ray after intubation shows ET tube a little low left lung reinflated extensive infiltrate persist  8/4 radiology has called MRI or results as acute cerebellar and brainstem infarct               08/04/21 1710  XR CHEST PORT Final result    Impression:  New diffuse airspace opacity in the left lung. Narrative:  Chest, frontal view, 8/4/2021       History: Shortness of breath. Comparison: Chest, same day. Findings: The cardiac silhouette is stable. The right lung is adequately   expanded and clear.   The left lung has diffuse airspace opacity, new as compared   to study performed earlier the same day. Differential considerations include   aspiration, collapse secondary to mucous plugging. Clinical correlation is   recommended. Left pleural effusion is not excluded. No pneumothorax is   identified. Degenerative changes are present in the thoracic spine. 08/04/21 0853  XR CHEST PORT Final result    Impression:  The cardiomediastinal silhouette is appropriate for age, technique,   and lung expansion. Pulmonary vasculature is not congested. The lungs are   essentially clear. No effusion or pneumothorax is seen. Narrative:  1 view               Results from Hospital Encounter encounter on 08/04/21    XR CHEST PORT    Narrative  XR CHEST PORT    Comparison: Chest radiograph dated August 6, 2021    Impression  FINDINGS/IMPRESSION:  Support lines and tubes are appropriate in radiographic position. Persistent airspace disease throughout the left lung, likely pneumonia. Cardiac  silhouette stable in size. Hemodynamic status stable. XR CHEST PORT    Narrative  1 view comparison yesterday    ET and NG tube remain    Mild improvement of diffuse left-sided mixed disease. Right lung remains clear. No effusion or pneumothorax. Normal heart and mediastinum      XR CHEST PORT    Narrative  1 view comparison yesterday    NG tube is coiled in the upper stomach. ET tube remains    Little change in pronounced diffuse airspace disease on the left. Right lung  remains clear. No effusion or pneumothorax. Normal heart and mediastinum    Results from Hospital Encounter encounter on 08/04/21    CT CODE NEURO HEAD WO CONTRAST    Narrative  CT head without contrast    Dose reduction: All CT scans at this facility are performed using dose reduction  optimization techniques as appropriate to a performed exam including the  following: Automated exposure control, adjustments of the mA and/or kV according  to patient size, or use of iterative reconstruction technique.     INDICATION: Code neuro    FINDINGS:    No acute intracranial bleed or midline shift. Areas of encephalomalacia in the  right hemisphere and left frontal lobe. Probable white matter small vessel  ischemic changes. Mild ex vacuo dilatation of right lateral ventricle. MRI is  more sensitive for evaluation of brain parenchyma. Atherosclerosis. No acute  skull fracture. Postoperative changes right skull. No fluid in the paranasal  sinuses or mastoid air cells. Caries and periapical infection/abscess involving  multiple teeth. Impression  No acute intracranial bleed. Bilateral areas of encephalomalacia. Atherosclerosis. A noncontrast head CT does not exclude the possibility of an  acute ischemic stroke. MRI is more sensitive for evaluation of brain parenchyma. Clinical correlation is recommended. Follow-up as clinically indicated. The  results were called and verbally communicated to Dr. Asuncion Angel on 8/4/2021 at  1:00 AM.    Dental disease. Please see full report. Discussion aspiration pneumonia with acute respiratory failure secondary to vomiting and brainstem and cerebellar infarct. Can add Decadron to try and prevent edema but it is not likely to help. 8/5 remains unresponsive does have some generalized movement when sternal rub. Renal function has deteriorated questionable ATN. Chest x-ray shows extensive infiltrate from aspiration. Troponin has elevated. MRI results as mentioned with new acute cerebellar infarcts and probable brainstem as well recovery seems unlikely  8/6 remains unresponsive on the ventilator on small dose of propofol to avoid cough on ventilator. He does have spontaneous respirations. Sputum does show gram negative zeke a few gram-positive cocci nasal MRSA smear is negative we will maintain Zosyn  8/7 remains unresponsive on the ventilator. Propofol reintroduced at low rate as he was bucking the ventilator.   No specific pathogen on sputum culture likely aspiration we will continue Zosyn phosphorus is low will replete  Time of care 30 minutes           Thank you for allowing us to participate in the care of this patient.   We will follow along with you    Gildardo Gomes MD No